# Patient Record
Sex: FEMALE | Race: WHITE | Employment: UNEMPLOYED | URBAN - METROPOLITAN AREA
[De-identification: names, ages, dates, MRNs, and addresses within clinical notes are randomized per-mention and may not be internally consistent; named-entity substitution may affect disease eponyms.]

---

## 2017-01-03 ENCOUNTER — OFFICE VISIT (OUTPATIENT)
Dept: FAMILY MEDICINE CLINIC | Age: 52
End: 2017-01-03

## 2017-01-03 VITALS
DIASTOLIC BLOOD PRESSURE: 51 MMHG | RESPIRATION RATE: 20 BRPM | SYSTOLIC BLOOD PRESSURE: 90 MMHG | OXYGEN SATURATION: 100 % | TEMPERATURE: 97.9 F | WEIGHT: 134 LBS | HEIGHT: 69 IN | BODY MASS INDEX: 19.85 KG/M2 | HEART RATE: 60 BPM

## 2017-01-03 DIAGNOSIS — Z80.8 FH: SKIN CANCER: ICD-10-CM

## 2017-01-03 DIAGNOSIS — Z12.31 ENCOUNTER FOR SCREENING MAMMOGRAM FOR BREAST CANCER: ICD-10-CM

## 2017-01-03 DIAGNOSIS — R63.5 WEIGHT GAIN: ICD-10-CM

## 2017-01-03 DIAGNOSIS — L98.9 FACE LESION: ICD-10-CM

## 2017-01-03 DIAGNOSIS — Z12.11 COLON CANCER SCREENING: ICD-10-CM

## 2017-01-03 DIAGNOSIS — Z00.00 HEALTHCARE MAINTENANCE: Primary | ICD-10-CM

## 2017-01-03 PROBLEM — Z90.49 S/P CHOLECYSTECTOMY: Status: ACTIVE | Noted: 2017-01-03

## 2017-01-03 NOTE — LETTER
1/4/2017 8:29 AM 
 
Ms. Chantell Jenkins [address] Dear Chantell Jenkins: Please find your most recent results below. Resulted Orders LIPID PANEL Result Value Ref Range Cholesterol, total 240 (H) 100 - 199 mg/dL Triglyceride 52 0 - 149 mg/dL HDL Cholesterol 107 >39 mg/dL VLDL, calculated 10 5 - 40 mg/dL LDL, calculated 123 (H) 0 - 99 mg/dL Narrative Performed at:  [address]  [id number] : Flor Doe MD, Phone:  [phone] CBC W/O DIFF Result Value Ref Range WBC 3.4 3.4 - 10.8 x10E3/uL  
 RBC 4.51 3.77 - 5.28 x10E6/uL HGB 13.8 11.1 - 15.9 g/dL HCT 40.6 34.0 - 46.6 % MCV 90 79 - 97 fL  
 MCH 30.6 26.6 - 33.0 pg  
 MCHC 34.0 31.5 - 35.7 g/dL  
 RDW 13.6 12.3 - 15.4 % PLATELET 348 039 - 119 x10E3/uL Narrative Performed at:  [address]  [id number] : Flor Doe MD, Phone:  [phone] METABOLIC PANEL, COMPREHENSIVE Result Value Ref Range Glucose 95 65 - 99 mg/dL BUN 14 6 - 24 mg/dL Creatinine 0.69 0.57 - 1.00 mg/dL GFR est non- >59 mL/min/1.73 GFR est  >59 mL/min/1.73  
 BUN/Creatinine ratio 20 9 - 23 Sodium 143 134 - 144 mmol/L Potassium 4.8 3.5 - 5.2 mmol/L Chloride 103 96 - 106 mmol/L  
 CO2 24 18 - 29 mmol/L Calcium 9.3 8.7 - 10.2 mg/dL Protein, total 6.6 6.0 - 8.5 g/dL Albumin 4.4 3.5 - 5.5 g/dL GLOBULIN, TOTAL 2.2 1.5 - 4.5 g/dL A-G Ratio 2.0 1.1 - 2.5 Bilirubin, total 1.3 (H) 0.0 - 1.2 mg/dL Alk. phosphatase 44 39 - 117 IU/L  
 AST 20 0 - 40 IU/L  
 ALT 23 0 - 32 IU/L Narrative Performed at:  [address]  [id number] : Flor Doe MD, Phone:  [phone] TSH, 3RD GENERATION Result Value Ref Range TSH 1.040 0.450 - 4.500 uIU/mL Narrative Performed at:  [address]  [id number] : Toby Garzon MD, Phone:  [phone] CVD REPORT Result Value Ref Range INTERPRETATION Note Comment:  
   Supplement report is available. Narrative Performed at:  [address] [place] [address]  [id number] : Gigi Morrow PhD, Phone:  [phone] Your lipid profile looks great. You have a 0.4 % chance of developing heart disease based on your current risks.  Because of this I suggest:  Keep up the great exercise regimen! Your thyroid is normal.    
 
Your bilirubin is a up a bit reflecting your recent gallbladder surgery. Sincerely, Pb Salmon MD

## 2017-01-03 NOTE — MR AVS SNAPSHOT
Visit Information Date & Time Provider Department Dept. Phone Encounter #  
 1/3/2017 10:00 AM Valentine Pascual MD [facility] [facility] [phone] [id number] Your Appointments 1/6/2017  9:00 AM  
COMPLETE PHYSICAL with Gopi Hdz MD  
[facility]) Appt Note: CPE with pap  
 [address] [employer] [facility]  
[phone]  
  
   
 [address] [place] [address] Upcoming Health Maintenance Date Due FOBT Q 1 YEAR AGE 50-75 8/7/2017 PAP AKA CERVICAL CYTOLOGY 9/22/2017 BREAST CANCER SCRN MAMMOGRAM 8/7/2018 DTaP/Tdap/Td series (2 - Td) 4/25/2021 Allergies as of 1/3/2017  Review Complete On: 1/3/2017 By: Elsie Reyna Severity Noted Reaction Type Reactions Amoxicillin  08/06/2010    Swelling Pcn [Penicillins]  05/10/2010    Swelling Current Immunizations  Reviewed on 11/10/2014 Name Date Influenza Vaccine PF 11/10/2014 TDAP Vaccine 4/25/2011 Not reviewed this visit You Were Diagnosed With   
  
 Codes Comments Healthcare maintenance    -  Primary ICD-10-CM: Z00.00 ICD-9-CM: V70.0 Colon cancer screening     ICD-10-CM: Z12.11 ICD-9-CM: V76.51 FH: skin cancer     ICD-10-CM: Z80.8 ICD-9-CM: V16.8 Face lesion     ICD-10-CM: L98.9 ICD-9-CM: 709.9 Encounter for screening mammogram for breast cancer     ICD-10-CM: Z12.31 
ICD-9-CM: V76.12 Vitals BP Pulse Temp Resp Height(growth percentile) Weight(growth percentile) 90/51 60 97.9 °F (36.6 °C) (Oral) 20 5' 9\" (1.753 m) 134 lb (60.8 kg) LMP SpO2 BMI OB Status Smoking Status 08/06/2009 100% 19.79 kg/m2 Postmenopausal Never Smoker BMI and BSA Data Body Mass Index Body Surface Area 19.79 kg/m 2 1.72 m 2 Preferred Pharmacy Pharmacy Name Phone  [address] [place], [place] - [address] [facility] AT [address] [place] [address] [address] [phone] Your Updated Medication List  
  
   
This list is accurate as of: 1/3/17 10:34 AM.  Always use your most recent med list.  
  
  
  
  
 cetirizine 10 mg tablet Commonly known as:  ZYRTEC  
TAKE 1 TABLET BY MOUTH DAILY. clotrimazole-betamethasone topical cream  
Commonly known as:  Karron Armand Apply twice daily for 2 weeks DENTA 5000 PLUS 1.1 % Crea Generic drug:  sodium fluoride diphenhydrAMINE 25 mg tablet Commonly known as:  BENADRYL Take 25 mg by mouth every six (6) hours as needed for Sleep. HERBAL DRUGS PO Take  by mouth. SYSTANE (PROPYLENE GLYCOL) 0.4-0.3 % Drop Generic drug:  peg 400-propylene glycol Administer  to left eye as needed. tretinoin 0.05 % topical cream  
Commonly known as:  RETIN-A  
APPLY TO AFFECTED AREA NIGHTLY AT BEDTIME To-Do List   
 01/03/2017 Imaging:  [facility] BI SCREENING INCL CAD Referral Information Referral ID Referred By Referred To  
  
 [id number] Benny Wadsworth Not Available Visits Status Start Date End Date 1 New Request 1/3/17 1/3/18 If your referral has a status of pending review or denied, additional information will be sent to support the outcome of this decision. Referral ID Referred By Referred To  
 [id number] Benny Wadsworth Not Available Visits Status Start Date End Date 1 New Request 1/3/17 1/3/18 If your referral has a status of pending review or denied, additional information will be sent to support the outcome of this decision. Referral ID Referred By Referred To  
 [id number] Benny Wadsworth Not Available Visits Status Start Date End Date 1 New Request 1/3/17 1/3/18 If your referral has a status of pending review or denied, additional information will be sent to support the outcome of this decision. Patient Instructions Please call Ever Coulter to help arrange and authorize any tests and/or referrals. Her # is [phone] Contact Dr. Héctor Claire office to see if you need colonoscopy See your dermatologist 
 
See facial plastic surgeon as we discussed In general, I advise patients to be as active as possible. I believe exercise is the key to long life and good health. The current recommendation is for individuals to exercise for 150 minutes each week (in other words 30 minutes 5 days a week). Exercise should be vigorous enough to work up a sweat. These activities include brisk walking, running, tennis, swimming, weight-lifting, etc.  
 
I usually tell folks that work is work and exercise is exercise. Each of these activities has a different goal.  Even though you may be active at work, it may not be aerobically adequate. So build dedicated exercise time into your weekly routine. Focus on regular exercise (150 minutes each week) and healthy eating. Eat more fruits and vegetables. Eat more protein (egg whites, beans, and nuts you know you tolerate) and less carbohydrates (white bread, white rice, white pasta, white potatoes, sodas, and sweets). Eat appropriately small portion sizes. Eat a healthy diet:  You may also wish to look into the Mediterranean or the DASH Diet:  
 
If any patient drinks alcohol, I typically suggest that a male drink no more than 2 beers (or glasses of wine, or shots of liquor) in any 24 hour period (and not daily). For females, the limits are one drink per 24 hours (and not daily). After these limits, the toxic effects of alcohol consumption start to manifest.  
 
Avoid tobacco products. I may provide separate instructions discussing smoking cessation strategies if needed. I suggest a wellness exam yearly during your birth month to update health maintenance issues such as fasting labs, EKGs and other studies, appropriate cancer screenings,  immunizations, etc.   
 
I suggest a yearly flu shot Labs today Gyn exam soon 
 
mammogram 
 
 
 
  
 Introducing [facility]! Dear Cristiane Ceballos: Thank you for requesting a [employer] account. Our records indicate that you already have an active [employer] account. You can access your account anytime at [email or web address] Did you know that you can access your hospital and ER discharge instructions at any time in [employer]? You can also review all of your test results from your hospital stay or ER visit. Additional Information If you have questions, please visit the Frequently Asked Questions section of the [employer] website at [email or web address] Remember, [employer] is NOT to be used for urgent needs. For medical emergencies, dial 911. Now available from your iPhone and Android! Please provide this summary of care documentation to your next provider. Your primary care clinician is listed as Farzaneh Martinez. If you have any questions after today's visit, please call [phone].

## 2017-01-03 NOTE — PROGRESS NOTES
Edel Taveras is a 46 y.o. female      Issues discussed today include:      CPx      We discussed health maintenance/diet/exercise/weight loss    PMH:  She had her GB removed and umbilical hernia repar by Dr. Loren Sethi    She sees Dr/ Azalia lam for GYN    She still feels the lump in her right cheek that Dr. Tatyana Sierra previously addressed. She wants second opinion with Dr. Bridget Finley    She needs f/u with her dermatologist Dr. Tiffani Villarreal for +FH skin cancer    I am still not clear if she had colonoscopy yet, will have her contact Dr. Molly Almazan office to clarify      Data reviewed or ordered today:  Labs, mammogram    Other problems include:  Patient Active Problem List   Diagnosis Code    GERD (gastroesophageal reflux disease) K21.9    S/P colonoscopy Z98.890    Allergic rhinitis J30.9    Unspecified vitamin D deficiency E55.9    Menopausal state N95.1    Dry eye syndrome H04.129    Hiatal hernia K44.9    S/P cholecystectomy Z90.49       Medications:  Current Outpatient Prescriptions   Medication Sig Dispense Refill    tretinoin (RETIN-A) 0.05 % topical cream APPLY TO AFFECTED AREA NIGHTLY AT BEDTIME 1 Tube 3    peg 400-propylene glycol (SYSTANE) 0.4-0.3 % Drop Administer  to left eye as needed.  clotrimazole-betamethasone (LOTRISONE) topical cream Apply twice daily for 2 weeks 45 g 0    cetirizine (ZYRTEC) 10 mg tablet TAKE 1 TABLET BY MOUTH DAILY. 90 Tab 3    diphenhydrAMINE (BENADRYL) 25 mg tablet Take 25 mg by mouth every six (6) hours as needed for Sleep.  DENTA 5000 PLUS 1.1 % crea   6    HERBAL DRUGS PO Take  by mouth. Allergies: Allergies   Allergen Reactions    Amoxicillin Swelling    Pcn [Penicillins] Swelling       LMP:  Patient's last menstrual period was 08/06/2009. Social History     Social History    Marital status:      Spouse name: N/A    Number of children: N/A    Years of education: N/A     Occupational History    Not on file.      Social History Main Topics    Smoking status: Never Smoker    Smokeless tobacco: Never Used    Alcohol use No    Drug use: Yes     Special: Benzodiazepines    Sexual activity: Yes     Partners: Male     Birth control/ protection: Condom     Other Topics Concern    Not on file     Social History Narrative    Moved her from [place]    Younger son has autism but was healed of speech problem         Family History   Problem Relation Age of Onset    Cancer Father     Ovarian Cancer Maternal Aunt 61     Other family history:  Son autism    Meaningful use:  done      ROS:  Headaches:  no  Chest Pain:  no  SOB:  no  Fevers:  no  Other significant ROS:  No GI/ issue, lump and swelling right cheek, she exercises regularly    Patient's last menstrual period was 08/06/2009. Physical Exam  Visit Vitals    BP 90/51    Pulse 60    Temp 97.9 °F (36.6 °C) (Oral)    Resp 20    Ht 5' 9\" (1.753 m)    Wt 134 lb (60.8 kg)    LMP 08/06/2009    SpO2 100%    BMI 19.79 kg/m2     BP Readings from Last 3 Encounters:   01/03/17 90/51   08/07/16 96/59   07/25/16 91/53     Constitutional:  Appears well,  No Acute Distress, Vitals noted  Psychiatric:   Affect normal, Alert and Oriented to person/place/time    Eyes:   Pupils equally round and reactive, EOMI, conjunctiva clear, eyelids normal  ENT:   External ears and nose normal/lips, teeth=OK/gums normal, TMs and Orophyarynx normal  Neck:   general inspection and Thyroid normal.  No abnormal cervical or supraclavicular nodes    Lungs:   clear to auscultation, good respiratory effort  Heart: Ausculation normal.  Regular rhythm. No cardiac murmurs.   No carotid bruits or palpable thrills  Chest wall normal    Extremities:   without edema, good peripheral pulses  Skin:   Warm to palpation, without rashes, bruising, or suspicious lesions , fullness right maxilla area    Abdominal exam:   normal.  Liver and spleen normal.  No bruits/masses/tenderness        Assessment:    Patient Active Problem List   Diagnosis Code    GERD (gastroesophageal reflux disease) K21.9    S/P colonoscopy Z98.890    Allergic rhinitis J30.9    Unspecified vitamin D deficiency E55.9    Menopausal state N95.1    Dry eye syndrome H04.129    Hiatal hernia K44.9    S/P cholecystectomy Z90.49       Today's diagnoses are:    ICD-10-CM ICD-9-CM    1. Healthcare maintenance Z00.00 V70.0 LIPID PANEL      CBC W/O DIFF      METABOLIC PANEL, COMPREHENSIVE   2. Colon cancer screening Z12.11 V76.51 REFERRAL TO GASTROENTEROLOGY   3. FH: skin cancer Z80.8 V16.8 REFERRAL TO DERMATOLOGY   4. Face lesion L98.9 709.9 REFERRAL TO PLASTIC SURGERY   5. Encounter for screening mammogram for breast cancer Z12.31 V76.12 [facility] BI SCREENING INCL CAD       Plan:  Orders Placed This Encounter    [facility] MAMMO BI SCREENING INCL CAD     Standing Status:   Future     Standing Expiration Date:   2/3/2018     Order Specific Question:   Reason for Exam     Answer:   screening    LIPID PANEL    CBC W/O DIFF    METABOLIC PANEL, COMPREHENSIVE    REFERRAL TO GASTROENTEROLOGY     Referral Priority:   Routine     Referral Type:   Consultation     Referral Reason:   Specialty Services Required    REFERRAL TO DERMATOLOGY     Referral Priority:   Routine     Referral Type:   Consultation     Referral Reason:   Specialty Services Required    REFERRAL TO PLASTIC SURGERY     Referral Priority:   Routine     Referral Type:   Consultation     Referral Reason:   Specialty Services Required       See patient instructions  Patient Instructions   Please call Pricilla Parr to help arrange and authorize any tests and/or referrals. Her # is [phone]     Contact Dr. العراقي Quest office to see if you need colonoscopy    See your dermatologist    See facial plastic surgeon as we discussed    In general, I advise patients to be as active as possible. I believe exercise is the key to long life and good health.   The current recommendation is for individuals to exercise for 150 minutes each week (in other words 30 minutes 5 days a week). Exercise should be vigorous enough to work up a sweat. These activities include brisk walking, running, tennis, swimming, weight-lifting, etc.     I usually tell folks that work is work and exercise is exercise. Each of these activities has a different goal.  Even though you may be active at work, it may not be aerobically adequate. So build dedicated exercise time into your weekly routine. Focus on regular exercise (150 minutes each week) and healthy eating. Eat more fruits and vegetables. Eat more protein (egg whites, beans, and nuts you know you tolerate) and less carbohydrates (white bread, white rice, white pasta, white potatoes, sodas, and sweets). Eat appropriately small portion sizes. Eat a healthy diet:  You may also wish to look into the Mediterranean or the DASH Diet:     If any patient drinks alcohol, I typically suggest that a male drink no more than 2 beers (or glasses of wine, or shots of liquor) in any 24 hour period (and not daily). For females, the limits are one drink per 24 hours (and not daily). After these limits, the toxic effects of alcohol consumption start to manifest.     Avoid tobacco products. I may provide separate instructions discussing smoking cessation strategies if needed.     I suggest a wellness exam yearly during your birth month to update health maintenance issues such as fasting labs, EKGs and other studies, appropriate cancer screenings,  immunizations, etc.      I suggest a yearly flu shot    Labs today    Gyn exam soon    mammogram            refresh note: done    AVS Printed:  done

## 2017-01-03 NOTE — PROGRESS NOTES
Chief Complaint   Patient presents with    Complete Physical     is fasting. Dr. Rosie Arguelles will do pap, et al at the end of the week. Reviewed record in preparation for visit and have obtained necessary documentation. 1. Have you been to the ER, urgent care clinic since your last visit? Hospitalized since your last visit? No    2. Have you seen or consulted any other health care providers outside of the [address] [place] [address] since your last visit? Include any pap smears or colon screening.  No

## 2017-01-04 PROBLEM — E78.9 LIPID DISORDER: Status: ACTIVE | Noted: 2017-01-04

## 2017-01-04 LAB
ALBUMIN SERPL-MCNC: 4.4 G/DL (ref 3.5–5.5)
ALBUMIN/GLOB SERPL: 2 {RATIO} (ref 1.1–2.5)
ALP SERPL-CCNC: 44 IU/L (ref 39–117)
ALT SERPL-CCNC: 23 IU/L (ref 0–32)
AST SERPL-CCNC: 20 IU/L (ref 0–40)
BILIRUB SERPL-MCNC: 1.3 MG/DL (ref 0–1.2)
BUN SERPL-MCNC: 14 MG/DL (ref 6–24)
BUN/CREAT SERPL: 20 (ref 9–23)
CALCIUM SERPL-MCNC: 9.3 MG/DL (ref 8.7–10.2)
CHLORIDE SERPL-SCNC: 103 MMOL/L (ref 96–106)
CHOLEST SERPL-MCNC: 240 MG/DL (ref 100–199)
CO2 SERPL-SCNC: 24 MMOL/L (ref 18–29)
CREAT SERPL-MCNC: 0.69 MG/DL (ref 0.57–1)
ERYTHROCYTE [DISTWIDTH] IN BLOOD BY AUTOMATED COUNT: 13.6 % (ref 12.3–15.4)
GLOBULIN SER CALC-MCNC: 2.2 G/DL (ref 1.5–4.5)
GLUCOSE SERPL-MCNC: 95 MG/DL (ref 65–99)
HCT VFR BLD AUTO: 40.6 % (ref 34–46.6)
HDLC SERPL-MCNC: 107 MG/DL
HGB BLD-MCNC: 13.8 G/DL (ref 11.1–15.9)
INTERPRETATION, 910389: NORMAL
LDLC SERPL CALC-MCNC: 123 MG/DL (ref 0–99)
MCH RBC QN AUTO: 30.6 PG (ref 26.6–33)
MCHC RBC AUTO-ENTMCNC: 34 G/DL (ref 31.5–35.7)
MCV RBC AUTO: 90 FL (ref 79–97)
PLATELET # BLD AUTO: 289 X10E3/UL (ref 150–379)
POTASSIUM SERPL-SCNC: 4.8 MMOL/L (ref 3.5–5.2)
PROT SERPL-MCNC: 6.6 G/DL (ref 6–8.5)
RBC # BLD AUTO: 4.51 X10E6/UL (ref 3.77–5.28)
SODIUM SERPL-SCNC: 143 MMOL/L (ref 134–144)
TRIGL SERPL-MCNC: 52 MG/DL (ref 0–149)
TSH SERPL DL<=0.005 MIU/L-ACNC: 1.04 UIU/ML (ref 0.45–4.5)
VLDLC SERPL CALC-MCNC: 10 MG/DL (ref 5–40)
WBC # BLD AUTO: 3.4 X10E3/UL (ref 3.4–10.8)

## 2017-01-04 NOTE — PROGRESS NOTES
Your lipid profile looks great. You have a 0.4 % chance of developing heart disease based on your current risks. Because of this I suggest:  Keep up the great exercise regimen! Your thyroid is normal.      Your bilirubin is a up a bit reflecting your recent gallbladder surgery.

## 2017-01-06 ENCOUNTER — OFFICE VISIT (OUTPATIENT)
Dept: FAMILY MEDICINE CLINIC | Age: 52
End: 2017-01-06

## 2017-01-06 VITALS
HEART RATE: 63 BPM | DIASTOLIC BLOOD PRESSURE: 65 MMHG | OXYGEN SATURATION: 99 % | TEMPERATURE: 98.4 F | RESPIRATION RATE: 17 BRPM | HEIGHT: 69 IN | SYSTOLIC BLOOD PRESSURE: 100 MMHG

## 2017-01-06 DIAGNOSIS — N95.1 MENOPAUSAL STATE: ICD-10-CM

## 2017-01-06 DIAGNOSIS — Z01.419 VISIT FOR PELVIC EXAM: Primary | ICD-10-CM

## 2017-01-06 DIAGNOSIS — N95.2 POSTMENOPAUSAL ATROPHIC VAGINITIS: ICD-10-CM

## 2017-01-06 RX ORDER — CETIRIZINE HCL 10 MG
TABLET ORAL
COMMUNITY
Start: 2016-12-28 | End: 2019-12-18

## 2017-01-06 RX ORDER — SODIUM FLUORIDE 1.1 G/100G
GEL, DENTIFRICE ORAL
Refills: 3 | COMMUNITY
Start: 2016-11-29 | End: 2019-05-28

## 2017-01-06 NOTE — PROGRESS NOTES
Subjective:   46 y.o. female  G [id number] s/p c/s x 2 for Well Woman Check. She is postmenopausal since 45s. Denies any hx of abn pap smears   Still currently . Denies any vaginal bleeding or spotting. Social History: single partner, contraception - post menopausal status. Pertinent past medical hstory: no history of HTN, DVT, CAD, DM, liver disease, migraines or smoking. FHx paternal 1nd Aunt with ovarian cancer. Dad with stomach cancer. Patient Active Problem List   Diagnosis Code    GERD (gastroesophageal reflux disease) K21.9    S/P colonoscopy Z98.890    Allergic rhinitis J30.9    Unspecified vitamin D deficiency E55.9    Menopausal state N95.1    Dry eye syndrome H04.129    Hiatal hernia K44.9    S/P cholecystectomy Z90.49    Lipid disorder E78.9     Patient Active Problem List    Diagnosis Date Noted    Lipid disorder 01/04/2017    S/P cholecystectomy 01/03/2017    Hiatal hernia 05/13/2013    Unspecified vitamin D deficiency 06/07/2012    Menopausal state 06/07/2012    Dry eye syndrome 06/07/2012    GERD (gastroesophageal reflux disease) 04/25/2011    S/P colonoscopy 04/25/2011    Allergic rhinitis 04/25/2011     Current Outpatient Prescriptions   Medication Sig Dispense Refill    cetirizine (ZYRTEC) 10 mg tablet       DENTA 5000 PLUS 1.1 % crea USE TWICE A DAY AS DIRECTED  3    tretinoin (RETIN-A) 0.05 % topical cream APPLY TO AFFECTED AREA NIGHTLY AT BEDTIME 1 Tube 3    peg 400-propylene glycol (SYSTANE) 0.4-0.3 % Drop Administer  to left eye as needed.          Allergies   Allergen Reactions    Amoxicillin Swelling    Pcn [Penicillins] Swelling     Past Medical History   Diagnosis Date    Acid indigestion     Esophagitis     Fast heart beat     Gallbladder attack     Gilbert syndrome     Hypercholesterolemia     Stomach pain      Past Surgical History   Procedure Laterality Date    Hx gyn  2006     c-sec    Hx heent  1970     tonsilectomy    Hx other surgical  3/8/16     single site laparoscopic cholecystectomy    Pr abdomen surgery proc unlisted      Hx cholecystectomy  3/2016    Hx cholecystectomy Right      Social History   Substance Use Topics    Smoking status: Never Smoker    Smokeless tobacco: Never Used    Alcohol use No        ROS:  Feeling well. No dyspnea or chest pain on exertion. No abdominal pain, change in bowel habits, black or bloody stools. No urinary tract symptoms. GYN ROS: no breast pain or new or enlarging lumps on self exam, no vaginal bleeding, no discharge or pelvic pain. Menopausal symptoms: none. No neurological complaints. Last DEXA scan and T-score: none  Last Colonoscopy: given FOBT card awaiting results from GI. Last Mammogram: to be scheduled. Does note some enlargement. Objective:     Visit Vitals    /65 (BP 1 Location: Left arm, BP Patient Position: Sitting)    Pulse 63    Temp 98.4 °F (36.9 °C) (Oral)    Resp 17    Ht 5' 9\" (1.753 m)    LMP 08/06/2009    SpO2 99%     The patient appears well, alert, oriented x 3, in no distress. ENT normal.  Neck supple. No adenopathy or thyromegaly. SAYDA. Lungs are clear, good air entry, no wheezes, rhonchi or rales. S1 and S2 normal, no murmurs, regular rate and rhythm. Abdomen soft without tenderness, guarding, mass or organomegaly. Extremities show no edema, normal peripheral pulses. Neurological is normal, no focal findings.     BREAST EXAM: breasts appear normal, no suspicious masses, no skin or nipple changes or axillary nodes    PELVIC EXAM: VULVA: normal appearing vulva with no masses, tenderness or lesions, VAGINA: atrophic, CERVIX: normal appearing cervix without discharge or lesions, atrophic with mild cervical stenosis, UTERUS: uterus is normal size, shape, consistency and nontender, retroverted, ADNEXA: normal adnexa in size, nontender and no masses, exam chaperoned by LPN    Assessment/Plan:   well woman  postmenopausal  return annually or prn  labs updated. atrophic vaginitis-cont supportive care. RTC 1 yr or prn. Rashida Garcia

## 2017-01-06 NOTE — PATIENT INSTRUCTIONS
Well Visit, Women 48 to 72: Care Instructions  Your Care Instructions  Physical exams can help you stay healthy. Your doctor has checked your overall health and may have suggested ways to take good care of yourself. He or she also may have recommended tests. At home, you can help prevent illness with healthy eating, regular exercise, and other steps. Follow-up care is a key part of your treatment and safety. Be sure to make and go to all appointments, and call your doctor if you are having problems. It's also a good idea to know your test results and keep a list of the medicines you take. How can you care for yourself at home? · Reach and stay at a healthy weight. This will lower your risk for many problems, such as obesity, diabetes, heart disease, and high blood pressure. · Get at least 30 minutes of exercise on most days of the week. Walking is a good choice. You also may want to do other activities, such as running, swimming, cycling, or playing tennis or team sports. · Do not smoke. Smoking can make health problems worse. If you need help quitting, talk to your doctor about stop-smoking programs and medicines. These can increase your chances of quitting for good. · Protect your skin from too much sun. When you're outdoors from 10 a.m. to 4 p.m., stay in the shade or cover up with clothing and a hat with a wide brim. Wear sunglasses that block UV rays. Even when it's cloudy, put broad-spectrum sunscreen (SPF 30 or higher) on any exposed skin. · See a dentist one or two times a year for checkups and to have your teeth cleaned. · Wear a seat belt in the car. · Limit alcohol to 1 drink a day. Too much alcohol can cause health problems. Follow your doctor's advice about when to have certain tests. These tests can spot problems early. · Cholesterol.  Your doctor will tell you how often to have this done based on your age, family history, or other things that can increase your risk for heart attack and stroke. · Blood pressure. Have your blood pressure checked during a routine doctor visit. Your doctor will tell you how often to check your blood pressure based on your age, your blood pressure results, and other factors. · Mammogram. Ask your doctor how often you should have a mammogram, which is an X-ray of your breasts. A mammogram can spot breast cancer before it can be felt and when it is easiest to treat. · Pap test and pelvic exam. Ask your doctor how often you should have a Pap test. You may not need to have a Pap test as often as you used to. · Vision. Have your eyes checked every year or two or as often as your doctor suggests. Some experts recommend that you have yearly exams for glaucoma and other age-related eye problems starting at age 48. · Hearing. Tell your doctor if you notice any change in your hearing. You can have tests to find out how well you hear. · Diabetes. Ask your doctor whether you should have tests for diabetes. · Colon cancer. You should begin tests for colon cancer at age 48. You may have one of several tests. Your doctor will tell you how often to have tests based on your age and risk. Risks include whether you already had a precancerous polyp removed from your colon or whether your parents, sisters and brothers, or children have had colon cancer. · Thyroid disease. Talk to your doctor about whether to have your thyroid checked as part of a regular physical exam. Women have an increased chance of a thyroid problem. · Osteoporosis. You should begin tests for bone density at age 72. If you are younger than 72, ask your doctor whether you have factors that may increase your risk for this disease. You may want to have this test before age 72. · Heart attack and stroke risk. At least every 4 to 6 years, you should have your risk for heart attack and stroke assessed.  Your doctor uses factors such as your age, blood pressure, cholesterol, and whether you smoke or have diabetes to show what your risk for a heart attack or stroke is over the next 10 years. When should you call for help? Watch closely for changes in your health, and be sure to contact your doctor if you have any problems or symptoms that concern you. Where can you learn more? Go to http://ruben-brayden.info/. Enter L300 in the search box to learn more about \"Well Visit, Women 50 to 72: Care Instructions. \"  Current as of: July 19, 2016  Content Version: 11.1  © 0749-3435 Tapatalk. Care instructions adapted under license by News[employer]rape (which disclaims liability or warranty for this information). If you have questions about a medical condition or this instruction, always ask your healthcare professional. [address] any warranty or liability for your use of this information. Hot Flashes During Menopause: Care Instructions  Your Care Instructions  A hot flash is a sudden feeling of intense body heat. Your head, neck, and chest may get red. Your heartbeat may speed up, and you may feel anxious or irritable. You may find that hot flashes occur more often in warm rooms or during stressful times. Hot flashes and other symptoms are a normal response to the hormone changes that occur before your menstrual cycle goes away completely (menopause). Hot flashes often get better and go away with time. Making a few changes, such as exercising more, practicing meditation, quitting smoking, and drinking less alcohol, can help. Some women take hormone pills or other medicine to treat bothersome symptoms. Follow-up care is a key part of your treatment and safety. Be sure to make and go to all appointments, and call your doctor if you are having problems. Its also a good idea to know your test results and keep a list of the medicines you take. How can you care for yourself at home? · If you decide to take medicine to treat hot flashes, take it exactly as prescribed. Call your doctor if you think you are having a problem with your medicine. You will get more details on the specific medicine your doctor prescribes. · Learn to meditate. Sit quietly and focus on your breathing. Try to practice each day. Books, classes, and tapes can help you start a program.  · Wear natural fabrics, such as cotton and silk. Dress in layers so you can take off clothes as needed. · Keep the room temperature cool, or use a fan. You are more likely to have a hot flash when you are too warm than when you are cool. · Use fewer blankets when you sleep at night. · Drink cold fluids rather than hot ones. Limit your intake of caffeine and alcohol. · Eat smaller meals more often during the day so your body makes less heat than when digesting large amounts of food. Eat low-fat and high-fiber foods. · Do not smoke. Smoking can make hot flashes worse. If you need help quitting, talk to your doctor about stop-smoking programs and medicines. These can increase your chances of quitting for good. · Get at least 30 minutes of exercise on most days of the week. Walking is a good choice. You also may want to do other activities, such as running, swimming, cycling, or playing tennis or team sports. Where can you learn more? Go to http://ruben-brayden.info/. Enter F700 in the search box to learn more about \"Hot Flashes During Menopause: Care Instructions. \"  Current as of: February 25, 2016  Content Version: 11.1  © 1476-9816 OpenX. Care instructions adapted under license by Secerno (which disclaims liability or warranty for this information). If you have questions about a medical condition or this instruction, always ask your healthcare professional. [place] [address] any warranty or liability for your use of this information.        Atrophic Vaginitis: Care Instructions  Your Care Instructions    Atrophic vaginitis is an irritation of the vagina. It's caused by thinning tissues and less moisture in the vaginal walls. It often happens during menopause when hormone levels change. Surgery to remove the ovaries also can cause it. Your doctor may do tests to rule out other causes. And you may get tests to measure your hormone levels. The problem is most often treated with the hormone estrogen. It comes in a cream, tablets, or a soft plastic ring that is placed in the vagina. Follow-up care is a key part of your treatment and safety. Be sure to make and go to all appointments, and call your doctor if you are having problems. It's also a good idea to know your test results and keep a list of the medicines you take. How can you care for yourself at home? · Use a water-based lubricant for your vagina if sex is dry or painful. Examples are Astroglide, Wet Lubricant Gel, and K-Y Jelly. · Talk with your doctor about using low-dose vaginal estrogen. It treats dryness and thinning tissue. · Do not douche. · Having sex improves blood flow to the vagina. This helps keep your tissue healthy. · Wipe from front to back after you use the toilet. This stops the spread of bacteria to your vagina. When should you call for help? Watch closely for changes in your health, and be sure to contact your doctor if:  · You have unexpected vaginal bleeding. · You do not get better as expected. Where can you learn more? Go to http://ruben-brayden.info/. Enter R330 in the search box to learn more about \"Atrophic Vaginitis: Care Instructions. \"  Current as of: February 25, 2016  Content Version: 11.1  © 8718-5729 Resonate. Care instructions adapted under license by Ask.com (which disclaims liability or warranty for this information).  If you have questions about a medical condition or this instruction, always ask your healthcare professional. [address] any warranty or liability for your use of this information.

## 2017-01-06 NOTE — MR AVS SNAPSHOT
Visit Information Date & Time Provider Department Dept. Phone Encounter #  
 1/6/2017  9:00 AM Favian Overall, [address] [facility] [phone] [id number] Upcoming Health Maintenance Date Due FOBT Q 1 YEAR AGE 50-75 8/7/2017 PAP AKA CERVICAL CYTOLOGY 9/22/2017 BREAST CANCER SCRN MAMMOGRAM 8/7/2018 DTaP/Tdap/Td series (2 - Td) 4/25/2021 Allergies as of 1/6/2017  Review Complete On: 1/6/2017 By: Favian Bae MD  
  
 Severity Noted Reaction Type Reactions Amoxicillin  08/06/2010    Swelling Pcn [Penicillins]  05/10/2010    Swelling Current Immunizations  Reviewed on 11/10/2014 Name Date Influenza Vaccine PF 11/10/2014 TDAP Vaccine 4/25/2011 Not reviewed this visit You Were Diagnosed With   
  
 Codes Comments Visit for pelvic exam    -  Primary ICD-10-CM: T02.007 ICD-9-CM: V72.31 Menopausal state     ICD-10-CM: N95.1 ICD-9-CM: 627.2 Postmenopausal atrophic vaginitis     ICD-10-CM: N95.2 ICD-9-CM: 627.3 Vitals BP Pulse Temp Resp Height(growth percentile) LMP  
 100/65 (BP 1 Location: Left arm, BP Patient Position: Sitting) 63 98.4 °F (36.9 °C) (Oral) 17 5' 9\" (1.753 m) 08/06/2009 SpO2 OB Status Smoking Status 99% Postmenopausal Never Smoker Preferred Pharmacy Pharmacy Name Phone [facility] [address] [place] [address] [address] [facility] [phone] Your Updated Medication List  
  
   
This list is accurate as of: 1/6/17  9:28 AM.  Always use your most recent med list.  
  
  
  
  
 cetirizine 10 mg tablet Commonly known as:  ZYRTEC  
  
 DENTA 5000 PLUS 1.1 % Crea Generic drug:  sodium fluoride USE TWICE A DAY AS DIRECTED SYSTANE (PROPYLENE GLYCOL) 0.4-0.3 % Drop Generic drug:  peg 400-propylene glycol Administer  to left eye as needed.   
  
 tretinoin 0.05 % topical cream  
 Commonly known as:  RETIN-A  
APPLY TO AFFECTED AREA NIGHTLY AT BEDTIME To-Do List   
 02/03/2017 9:15 AM  
(Arrive by 9:00 AM) Appointment with [facility] 1 at [facility] ([phone]) Shower or bathe using soap and water. Do not use deodorant, powder, perfumes, or lotion the day of your exam.  If your prior mammograms were not performed at [facility] 6 please bring films with you or forward prior images 2 days before your procedure. Check in at registration 15min before your appointment time unless you were instructed to do otherwise. A script is not necessary, but if you have one, please bring it on the day of the mammogram or have it faxed to the department. [facility] [phone] [facility]  [phone] [address]  [phone] [facility] [phone] [place] [id number] [facility] [phone] Please arrive 15 minutes prior to appointment to register Patient Instructions Well Visit, Women 48 to 72: Care Instructions Your Care Instructions Physical exams can help you stay healthy. Your doctor has checked your overall health and may have suggested ways to take good care of yourself. He or she also may have recommended tests. At home, you can help prevent illness with healthy eating, regular exercise, and other steps. Follow-up care is a key part of your treatment and safety. Be sure to make and go to all appointments, and call your doctor if you are having problems. It's also a good idea to know your test results and keep a list of the medicines you take. How can you care for yourself at home? · Reach and stay at a healthy weight. This will lower your risk for many problems, such as obesity, diabetes, heart disease, and high blood pressure. · Get at least 30 minutes of exercise on most days of the week. Walking is a good choice. You also may want to do other activities, such as running, swimming, cycling, or playing tennis or team sports. · Do not smoke. Smoking can make health problems worse. If you need help quitting, talk to your doctor about stop-smoking programs and medicines. These can increase your chances of quitting for good. · Protect your skin from too much sun. When you're outdoors from 10 a.m. to 4 p.m., stay in the shade or cover up with clothing and a hat with a wide brim. Wear sunglasses that block UV rays. Even when it's cloudy, put broad-spectrum sunscreen (SPF 30 or higher) on any exposed skin. · See a dentist one or two times a year for checkups and to have your teeth cleaned. · Wear a seat belt in the car. · Limit alcohol to 1 drink a day. Too much alcohol can cause health problems. Follow your doctor's advice about when to have certain tests. These tests can spot problems early. · Cholesterol. Your doctor will tell you how often to have this done based on your age, family history, or other things that can increase your risk for heart attack and stroke. · Blood pressure. Have your blood pressure checked during a routine doctor visit. Your doctor will tell you how often to check your blood pressure based on your age, your blood pressure results, and other factors. · Mammogram. Ask your doctor how often you should have a mammogram, which is an X-ray of your breasts. A mammogram can spot breast cancer before it can be felt and when it is easiest to treat. · Pap test and pelvic exam. Ask your doctor how often you should have a Pap test. You may not need to have a Pap test as often as you used to. · Vision. Have your eyes checked every year or two or as often as your doctor suggests. Some experts recommend that you have yearly exams for glaucoma and other age-related eye problems starting at age 48. · Hearing. Tell your doctor if you notice any change in your hearing. You can have tests to find out how well you hear. · Diabetes. Ask your doctor whether you should have tests for diabetes. · Colon cancer. You should begin tests for colon cancer at age 48. You may have one of several tests. Your doctor will tell you how often to have tests based on your age and risk. Risks include whether you already had a precancerous polyp removed from your colon or whether your parents, sisters and brothers, or children have had colon cancer. · Thyroid disease. Talk to your doctor about whether to have your thyroid checked as part of a regular physical exam. Women have an increased chance of a thyroid problem. · Osteoporosis. You should begin tests for bone density at age 72. If you are younger than 72, ask your doctor whether you have factors that may increase your risk for this disease. You may want to have this test before age 72. · Heart attack and stroke risk. At least every 4 to 6 years, you should have your risk for heart attack and stroke assessed. Your doctor uses factors such as your age, blood pressure, cholesterol, and whether you smoke or have diabetes to show what your risk for a heart attack or stroke is over the next 10 years. When should you call for help? Watch closely for changes in your health, and be sure to contact your doctor if you have any problems or symptoms that concern you. Where can you learn more? Go to http://ruben-brayden.info/. Enter T416 in the search box to learn more about \"Well Visit, Women 50 to 72: Care Instructions. \" Current as of: July 19, 2016 Content Version: 11.1 © 0827-4992 ubigrate. Care instructions adapted under license by [facility] (which disclaims liability or warranty for this information). If you have questions about a medical condition or this instruction, always ask your healthcare professional. [place] [address] any warranty or liability for your use of this information. Hot Flashes During Menopause: Care Instructions Your Care Instructions A hot flash is a sudden feeling of intense body heat. Your head, neck, and chest may get red. Your heartbeat may speed up, and you may feel anxious or irritable. You may find that hot flashes occur more often in warm rooms or during stressful times. Hot flashes and other symptoms are a normal response to the hormone changes that occur before your menstrual cycle goes away completely (menopause). Hot flashes often get better and go away with time. Making a few changes, such as exercising more, practicing meditation, quitting smoking, and drinking less alcohol, can help. Some women take hormone pills or other medicine to treat bothersome symptoms. Follow-up care is a key part of your treatment and safety. Be sure to make and go to all appointments, and call your doctor if you are having problems. Its also a good idea to know your test results and keep a list of the medicines you take. How can you care for yourself at home? · If you decide to take medicine to treat hot flashes, take it exactly as prescribed. Call your doctor if you think you are having a problem with your medicine. You will get more details on the specific medicine your doctor prescribes. · Learn to meditate. Sit quietly and focus on your breathing. Try to practice each day. Books, classes, and tapes can help you start a program. 
· Wear natural fabrics, such as cotton and silk. Dress in layers so you can take off clothes as needed. · Keep the room temperature cool, or use a fan. You are more likely to have a hot flash when you are too warm than when you are cool. · Use fewer blankets when you sleep at night. · Drink cold fluids rather than hot ones. Limit your intake of caffeine and alcohol. · Eat smaller meals more often during the day so your body makes less heat than when digesting large amounts of food. Eat low-fat and high-fiber foods. · Do not smoke. Smoking can make hot flashes worse.  If you need help quitting, talk to your doctor about stop-smoking programs and medicines. These can increase your chances of quitting for good. · Get at least 30 minutes of exercise on most days of the week. Walking is a good choice. You also may want to do other activities, such as running, swimming, cycling, or playing tennis or team sports. Where can you learn more? Go to http://ruben-brayden.info/. Enter F700 in the search box to learn more about \"Hot Flashes During Menopause: Care Instructions. \" Current as of: February 25, 2016 Content Version: 11.1 © 3651-7087 FundRazr. Care instructions adapted under license by [employer] (which disclaims liability or warranty for this information). If you have questions about a medical condition or this instruction, always ask your healthcare professional. [address] any warranty or liability for your use of this information. Atrophic Vaginitis: Care Instructions Your Care Instructions Atrophic vaginitis is an irritation of the vagina. It's caused by thinning tissues and less moisture in the vaginal walls. It often happens during menopause when hormone levels change. Surgery to remove the ovaries also can cause it. Your doctor may do tests to rule out other causes. And you may get tests to measure your hormone levels. The problem is most often treated with the hormone estrogen. It comes in a cream, tablets, or a soft plastic ring that is placed in the vagina. Follow-up care is a key part of your treatment and safety. Be sure to make and go to all appointments, and call your doctor if you are having problems. It's also a good idea to know your test results and keep a list of the medicines you take. How can you care for yourself at home? · Use a water-based lubricant for your vagina if sex is dry or painful. Examples are Astroglide, Wet Lubricant Gel, and K-Y Jelly. · Talk with your doctor about using low-dose vaginal estrogen. It treats dryness and thinning tissue. · Do not douche. · Having sex improves blood flow to the vagina. This helps keep your tissue healthy. · Wipe from front to back after you use the toilet. This stops the spread of bacteria to your vagina. When should you call for help? Watch closely for changes in your health, and be sure to contact your doctor if: 
· You have unexpected vaginal bleeding. · You do not get better as expected. Where can you learn more? Go to http://ruben-brayden.info/. Enter R330 in the search box to learn more about \"Atrophic Vaginitis: Care Instructions. \" Current as of: February 25, 2016 Content Version: 11.1 © 6068-7764 PharmaSecure. Care instructions adapted under license by Keyhole.co (which disclaims liability or warranty for this information). If you have questions about a medical condition or this instruction, always ask your healthcare professional. [address] any warranty or liability for your use of this information. Introducing [facility]! Dear Monserrat Tamayo: Thank you for requesting a ShieldEffect account. Our records indicate that you already have an active ShieldEffect account. You can access your account anytime at [email or web address] Did you know that you can access your hospital and ER discharge instructions at any time in ShieldEffect? You can also review all of your test results from your hospital stay or ER visit. Additional Information If you have questions, please visit the Frequently Asked Questions section of the ShieldEffect website at [email or web address] Remember, ShieldEffect is NOT to be used for urgent needs. For medical emergencies, dial 911. Now available from your iPhone and Android! Please provide this summary of care documentation to your next provider. Your primary care clinician is listed as Emily Morrissey. If you have any questions after today's visit, please call [phone].

## 2017-01-06 NOTE — PROGRESS NOTES
Chief Complaint   Patient presents with   Nonda Fake Exam     1. Have you been to the ER, urgent care clinic since your last visit? Hospitalized since your last visit? No    2. Have you seen or consulted any other health care providers outside of the [facility] since your last visit? Include any pap smears or colon screening. No       Unable to obtain patients weight.

## 2017-01-16 RX ORDER — TRETINOIN 0.5 MG/G
CREAM TOPICAL
Qty: 45 G | Refills: 3 | Status: SHIPPED | OUTPATIENT
Start: 2017-01-16 | End: 2017-03-19 | Stop reason: SDUPTHER

## 2017-02-03 ENCOUNTER — HOSPITAL ENCOUNTER (OUTPATIENT)
Dept: MAMMOGRAPHY | Age: 52
Discharge: HOME | End: 2017-02-03
Payer: OTHER GOVERNMENT

## 2017-02-03 DIAGNOSIS — Z12.31 ENCOUNTER FOR SCREENING MAMMOGRAM FOR BREAST CANCER: ICD-10-CM

## 2017-02-03 PROCEDURE — 77067 SCR MAMMO BI INCL CAD: CPT

## 2017-02-03 NOTE — LETTER
2/6/2017 8:39 AM 
 
Ms. Chantell Jenkins [address] Dear Chantell Jenkins: Please find your most recent results below. Resulted Orders [facility]IO MAMMO BI SCREENING INCL CAD Narrative STUDY:  Bilateral digital screening mammogram   
 
INDICATION:  Screening COMPARISON:  Most recent 2014 FINDINGS: Bilateral digital screening mammography was performed, and is 
interpreted in conjunction with a computer assisted detection (CAD) system. The 
breast parenchymal pattern is heterogeneously dense, which may diminish the 
sensitivity of mammography. No new masses or suspicious calcifications are 
identified. There is no skin thickening or nipple retraction. Impression IMPRESSION:  
No mammographic evidence of malignancy. Next screening mammogram is recommended 
in one year. The patient will be notified of these results. BIRADS 1:  Negative. In accordance with [place] legislation enacted July 2012 (32.1-229 of the Code 
of [place]), we have informed this patient by letter that she may have dense 
breast tissue. Dense breast tissue can hide cancer or other abnormalities. We 
have instructed her to contact her referring physician if she has any questions 
or concerns about this report. There are many factors that can increase a 
woman's risk of developing breast cancer, including a family history of breast 
cancer. A woman's lifetime risk of developing breast cancer can be estimated 
using the Breast Cancer Risk Assessment Tool, found on the National Cancer Institutes website (www.cancer.gov/bcrisktool/). The addition of breast MRI as a 
screening tool may be useful for women with lifetime risk assessments greater 
than 20%. Your recent mammogram was normal.  Congratulations.  Please follow up in one year. Sincerely, Marilyn Arenas MD

## 2017-02-03 NOTE — LETTER
2/6/2017 8:38 AM 
 
Ms. Bertha Ramírez [address] Dear Bertha Ramírez: Please find your most recent results below. Resulted Orders [facility]IO MAMMO BI SCREENING INCL CAD Narrative STUDY:  Bilateral digital screening mammogram   
 
INDICATION:  Screening COMPARISON:  Most recent 2014 FINDINGS: Bilateral digital screening mammography was performed, and is 
interpreted in conjunction with a computer assisted detection (CAD) system. The 
breast parenchymal pattern is heterogeneously dense, which may diminish the 
sensitivity of mammography. No new masses or suspicious calcifications are 
identified. There is no skin thickening or nipple retraction. Impression IMPRESSION:  
No mammographic evidence of malignancy. Next screening mammogram is recommended 
in one year. The patient will be notified of these results. BIRADS 1:  Negative. In accordance with [place] legislation enacted July 2012 (32.1-229 of the Code 
of [place]), we have informed this patient by letter that she may have dense 
breast tissue. Dense breast tissue can hide cancer or other abnormalities. We 
have instructed her to contact her referring physician if she has any questions 
or concerns about this report. There are many factors that can increase a 
woman's risk of developing breast cancer, including a family history of breast 
cancer. A woman's lifetime risk of developing breast cancer can be estimated 
using the Breast Cancer Risk Assessment Tool, found on the National Cancer Institutes website (www.cancer.gov/bcrisktool/). The addition of breast MRI as a 
screening tool may be useful for women with lifetime risk assessments greater 
than 20%. Your recent mammogram was normal.  Congratulations.  Please follow up in one year. Sincerely, Emma Hanson MD

## 2017-03-01 ENCOUNTER — TELEPHONE (OUTPATIENT)
Dept: FAMILY MEDICINE CLINIC | Age: 52
End: 2017-03-01

## 2017-03-01 DIAGNOSIS — K46.9 HERNIA: Primary | ICD-10-CM

## 2017-03-01 NOTE — TELEPHONE ENCOUNTER
Patient called to say she now has an ambilical hernia of last week like she had almost a year ago at the belly button. Every now and then she felt a jesse horse in the stomach and has a little bump/bulge there now.      She does do yoga.      Asking for advice as to what to do this time. [phone],    I sugggest she follow up with her surgeon    Please call Rosita Gaucher to help arrange and authorize any tests and/or referrals.   Her # is [phone]

## 2017-03-01 NOTE — TELEPHONE ENCOUNTER
Patient called to say she now has an ambilical hernia of last week like she had almost a year ago at the belly button. Every now and then she felt a jesse horse in the stomach and has a little bump/bulge there now. She does do yoga. Asking for advice as to what to do this time.   [phone],

## 2017-03-06 ENCOUNTER — TELEPHONE (OUTPATIENT)
Dept: FAMILY MEDICINE CLINIC | Age: 52
End: 2017-03-06

## 2017-03-06 NOTE — TELEPHONE ENCOUNTER
Patient called to check the status of her referral for her appointment tomorrow. Informed patient referral was complete. Patient verbalized understanding.

## 2017-03-07 ENCOUNTER — OFFICE VISIT (OUTPATIENT)
Dept: SURGERY | Age: 52
End: 2017-03-07

## 2017-03-07 VITALS
TEMPERATURE: 98.1 F | DIASTOLIC BLOOD PRESSURE: 61 MMHG | SYSTOLIC BLOOD PRESSURE: 98 MMHG | OXYGEN SATURATION: 98 % | HEART RATE: 60 BPM | WEIGHT: 136 LBS | HEIGHT: 69 IN | BODY MASS INDEX: 20.14 KG/M2 | RESPIRATION RATE: 14 BRPM

## 2017-03-07 DIAGNOSIS — K43.2 INCISIONAL HERNIA, WITHOUT OBSTRUCTION OR GANGRENE: Primary | ICD-10-CM

## 2017-03-07 NOTE — PROGRESS NOTES
1. Have you been to the ER, urgent care clinic since your last visit? Hospitalized since your last visit?no    2. Have you seen or consulted any other health care providers outside of the [address] [place] [address] since your last visit? Include any pap smears or colon screening.  no

## 2017-03-08 NOTE — PROGRESS NOTES
Surgery Consult    Subjective:      Barbie Mistry is a 46 y.o. female who is being seen for incisional hernia at her umbilicus. Patient has symptoms of bulging, which are made worse with bending, coughing, lifting. Symptoms were first noted several weeks ago. She denies pain. She first noticed the bulge while doing Yoga. Lump is reducible. Patient does not have symptoms of chronic constipation, chronic cough, difficulty urinating. Patient has a history of single incision robotic gallbladder surgery one year ago. Patient Active Problem List    Diagnosis Date Noted    Lipid disorder 01/04/2017    S/P cholecystectomy 01/03/2017    Hiatal hernia 05/13/2013    Unspecified vitamin D deficiency 06/07/2012    Menopausal state 06/07/2012    Dry eye syndrome 06/07/2012    GERD (gastroesophageal reflux disease) 04/25/2011    S/P colonoscopy 04/25/2011    Allergic rhinitis 04/25/2011      Past Medical History:   Diagnosis Date    Acid indigestion     Esophagitis     Fast heart beat     Gallbladder attack     Gilbert syndrome     Hypercholesterolemia     Stomach pain      Past Surgical History:   Procedure Laterality Date    ABDOMEN SURGERY PROC UNLISTED      HX CHOLECYSTECTOMY  3/2016    HX CHOLECYSTECTOMY Right     HX GYN  2006    c-sec    HX HEENT  1970    tonsilectomy    HX OTHER SURGICAL  3/8/16    single site laparoscopic cholecystectomy     Family History   Problem Relation Age of Onset    Cancer Father     Ovarian Cancer Maternal Aunt 61      Social History   Substance Use Topics    Smoking status: Never Smoker    Smokeless tobacco: Never Used    Alcohol use No      Current Outpatient Prescriptions   Medication Sig    tretinoin (RETIN-A) 0.05 % topical cream APPLY TO AFFECTED AREA, NIGHTLY AT BEDTIME    cetirizine (ZYRTEC) 10 mg tablet     DENTA 5000 PLUS 1.1 % crea USE TWICE A DAY AS DIRECTED    peg 400-propylene glycol (SYSTANE) 0.4-0.3 % Drop Administer  to left eye as needed. No current facility-administered medications for this visit. Allergies   Allergen Reactions    Amoxicillin Swelling    Pcn [Penicillins] Swelling        Review of Systems:  A comprehensive review of systems was negative except for that written in the History of Present Illness. Objective:     Blood pressure 98/61, pulse 60, temperature 98.1 °F (36.7 °C), temperature source Oral, resp. rate 14, height 5' 9\" (1.753 m), weight 136 lb (61.7 kg), last menstrual period 08/06/2009, SpO2 98 %. Physical Exam:  GENERAL: alert, cooperative, no distress, appears stated age, EYE: conjunctivae/corneas clear. , EOM's intact. , LUNG: clear to auscultation bilaterally, HEART: regular rate and rhythm, S1, S2 normal, no murmur, click, rub or gallop, ABDOMEN: soft, non-tender. Bowel sounds normal. No masses,  no organomegaly, 2 cm reducible incisional hernia NEUROLOGIC: AOx3. Gait normal. Reflexes and motor strength normal and symmetric. Cranial nerves 2-12 and sensation grossly intact. Assessment:     Incisional which is easily reducible. Plan:     1. Discussed possibility of incarceration, strangulation, enlargement in size over time, and the risk of emergency surgery in the face of strangulation. Also discussed the risks of surgery including recurrence which can be up to 50% in the case of incisional or complex hernias, use of prosthetic materials (mesh) and the increased risk of infection, postoperative infection and the possible need for reoperation and removal of mesh if used, possibility of postoperative small bowel obstruction or ileus, and the risks of general anesthetic. The patient understands the risks; any and all questions were answered to the patient's satisfaction.     2.  Patient is going to think about it and get back to me  Signed By: Jere Chu MD     March 8, 2017

## 2017-03-19 NOTE — TELEPHONE ENCOUNTER
Patient is requesting a refill on  Requested Prescriptions     Pending Prescriptions Disp Refills    tretinoin (RETIN-A) 0.05 % topical cream 45 g 3     Thank you

## 2017-03-20 RX ORDER — TRETINOIN 0.5 MG/G
CREAM TOPICAL
Qty: 135 G | Refills: 1 | Status: SHIPPED | COMMUNITY
Start: 2017-03-20 | End: 2019-03-14 | Stop reason: SDUPTHER

## 2017-06-28 ENCOUNTER — OFFICE VISIT (OUTPATIENT)
Dept: FAMILY MEDICINE CLINIC | Age: 52
End: 2017-06-28

## 2017-06-28 VITALS
HEART RATE: 66 BPM | HEIGHT: 69 IN | WEIGHT: 128 LBS | DIASTOLIC BLOOD PRESSURE: 71 MMHG | SYSTOLIC BLOOD PRESSURE: 106 MMHG | BODY MASS INDEX: 18.96 KG/M2 | TEMPERATURE: 98.1 F | OXYGEN SATURATION: 99 % | RESPIRATION RATE: 16 BRPM

## 2017-06-28 DIAGNOSIS — M25.561 RIGHT KNEE PAIN, UNSPECIFIED CHRONICITY: Primary | ICD-10-CM

## 2017-06-28 NOTE — PROGRESS NOTES
HPI:  Abida Martin is a 46 y.o. female who presents with right  knee pain for 2 weeks. She was doing yoga and had her knee fully flexed and hip externally rotated with her right foot sitting in her left groin while standing balancing on her left leg. She then went to do a one leg squat with her left leg. As she bent down, she felt a sharp pain in the posterior knee and felt a snap. Pain has improved. No swelling. No weakness. She is able to walk without pain. She has not been able to return to full yoga routine because she has pain when she has her knee fully flexed and tibia internally rotated such as the position when she was injured. She is able to do other activities without pain and only has pain when the knee is in the described position. Denies locking, catching or giving way. Past Medical History:   Diagnosis Date    Acid indigestion     Esophagitis     Fast heart beat     Gallbladder attack     Gilbert syndrome     Hypercholesterolemia     Stomach pain        Current Outpatient Prescriptions:     tretinoin (RETIN-A) 0.05 % topical cream, Apply  to affected area nightly., Disp: 135 g, Rfl: 1    DENTA 5000 PLUS 1.1 % crea, USE TWICE A DAY AS DIRECTED, Disp: , Rfl: 3    peg 400-propylene glycol (SYSTANE) 0.4-0.3 % Drop, Administer  to left eye as needed. , Disp: , Rfl:     cetirizine (ZYRTEC) 10 mg tablet, , Disp: , Rfl:   Allergies   Allergen Reactions    Amoxicillin Swelling    Pcn [Penicillins] Swelling     Past Medical History:   Diagnosis Date    Acid indigestion     Esophagitis     Fast heart beat     Gallbladder attack     Gilbert syndrome     Hypercholesterolemia     Stomach pain      Family History   Problem Relation Age of Onset    Cancer Father     Ovarian Cancer Maternal Aunt 60       ROS: As per HPI otherwise negative.     Objective:   Visit Vitals    /71 (BP 1 Location: Left arm, BP Patient Position: Sitting)    Pulse 66    Temp 98.1 °F (36.7 °C) (Oral)    Resp 16    Ht 5' 9\" (1.753 m)    Wt 128 lb (58.1 kg)    LMP 08/06/2009    SpO2 99%    BMI 18.9 kg/m2     Gen: Well appearing. No apparent distress. Alert and oriented. Responds to all questions appropriately. Lungs: No labored respirations. Talking in complete sentences without difficulty. Musculoskeletal:  Knee: right  Knee Effusion: None  Quadriceps atrophy: None   Popliteal (Bakers) Cyst: Negative   Patellofemoral crepitus: Negative    ROM:  Flexion: 130  Extension: 0   Hip IR/ER: FROM without pain    Dynamic Test:  Gait: Normal   Assistive devices: None    Palpation:   Patella tenderness: None  Patellar tendon tenderness: None  Quad tendon tenderness: None  Medial joint line tenderness: None  Lateral joint line tenderness: None  MCL tenderness: None  LCL tenderness: None  Medial facet tenderness: None  Lateral facet tenderness: None  Condyle tenderness: None  Tibia tubercle tenderness: None  Proximal fibula tenderness: None  Plica tenderness: None  Prepatellar bursa tenderness: None  Pes bursa tenderness: None  ITB tenderness: None    Ligament/Meniscal Exam:  Patellar Grind: Negative   Patellar apprehension (medial/lateral): Negative   Lochman: Negative, with good endpoint   Anterior Drawer: Negative   Posterior Drawer: Negative   Valgus stress: Negative with good endpoint   Varus stress: Negative with good endpoint   Regan: Negative   Thessaly: Negative  Gricelda sign: Negative. Pain with knee at the end of flexion and tibia internally rotated. Strength (0-5/5):   Flexion: Left: 5/5    Right: 5/5    Extension: Left: 5/5    Right: 5/5    Hip abduction: 5/5    Hip adduction: 5/5      Neuro/Vascular : Pulses intact, no edema, and neurologically intact . Skin: No obvious rash or skin breakdown. Imaging: Radiographs of the right knee personally reviewed and demonstrates no obvious fracture or dislocation. Mild degenerative changes. ASSESSMENT:    ICD-10-CM ICD-9-CM    1.  Right knee pain, unspecified chronicity M25.561 719.46 XR KNEE RT MIN 4 V   Possible popliteus injury. Also possible for posterior meniscal injury but exam WNL and currently no mechanical sx. PLAN:    1. Home Exercise Program as per handout. Can continue to exercise but avoid full flexion of the knee underload and with rotation of tibia. 2. Ice 15 minutes, three times a day PRN and after exercise. Can alternate with heat for 15 minutes. Medications:    1. Naproxin (Aleve): 220mg 1-2 tablets twice a day PRN. 2. Acetaminophen (Tylenol):  500mg 1-2 tablets every 6 hours as needed for pain. RTC: 4 weeks if not improving. Will consider MRI if no change in sx.

## 2017-06-28 NOTE — PROGRESS NOTES
Chief Complaint   Patient presents with    Knee Injury     pt states she heard a snap in rt knee during yoga about 2 weeks      1. Have you been to the ER, urgent care clinic since your last visit? Hospitalized since your last visit? No    2. Have you seen or consulted any other health care providers outside of the [facility] since your last visit? Include any pap smears or colon screening.  No

## 2017-06-28 NOTE — MR AVS SNAPSHOT
Visit Information Date & Time Provider Department Dept. Phone Encounter #  
 6/28/2017  2:15 PM Tana Valdez [phone] [id number] Upcoming Health Maintenance Date Due FOBT Q 1 YEAR AGE 50-75 8/7/2017 PAP AKA CERVICAL CYTOLOGY 9/22/2017 INFLUENZA AGE 9 TO ADULT 8/1/2017 BREAST CANCER SCRN MAMMOGRAM 2/3/2019 DTaP/Tdap/Td series (2 - Td) 4/25/2021 Allergies as of 6/28/2017  Review Complete On: 3/8/2017 By: Bryanna Lau MD  
  
 Severity Noted Reaction Type Reactions Amoxicillin  08/06/2010    Swelling Pcn [Penicillins]  05/10/2010    Swelling Current Immunizations  Reviewed on 11/10/2014 Name Date Influenza Vaccine PF 11/10/2014 TDAP Vaccine 4/25/2011 Not reviewed this visit You Were Diagnosed With   
  
 Codes Comments Right knee pain, unspecified chronicity    -  Primary ICD-10-CM: M25.561 ICD-9-CM: 719.46 Vitals BP Pulse Temp Resp Height(growth percentile) Weight(growth percentile) 106/71 (BP 1 Location: Left arm, BP Patient Position: Sitting) 66 98.1 °F (36.7 °C) (Oral) 16 5' 9\" (1.753 m) 128 lb (58.1 kg) LMP SpO2 BMI OB Status Smoking Status 08/06/2009 99% 18.9 kg/m2 Postmenopausal Never Smoker Vitals History BMI and BSA Data Body Mass Index Body Surface Area 18.9 kg/m 2 1.68 m 2 Preferred Pharmacy Pharmacy Name Phone [address] [facility] [phone] Your Updated Medication List  
  
   
This list is accurate as of: 6/28/17  4:34 PM.  Always use your most recent med list.  
  
  
  
  
 cetirizine 10 mg tablet Commonly known as:  ZYRTEC  
  
 DENTA 5000 PLUS 1.1 % Crea Generic drug:  sodium fluoride USE TWICE A DAY AS DIRECTED SYSTANE (PROPYLENE GLYCOL) 0.4-0.3 % Drop Generic drug:  peg 400-propylene glycol Administer  to left eye as needed. tretinoin 0.05 % topical cream  
Commonly known as:  RETIN-A Apply  to affected area nightly. To-Do List   
 06/28/2017 Imaging:  XR KNEE RT MIN 4 V Introducing [facility]! Dear Keya Fernandez: Thank you for requesting a [employer] account. Our records indicate that you already have an active [employer] account. You can access your account anytime at [email or web address]. [email or web address] Did you know that you can access your hospital and ER discharge instructions at any time in [employer]? You can also review all of your test results from your hospital stay or ER visit. Additional Information If you have questions, please visit the Frequently Asked Questions section of the [employer] website at [email or web address] Remember, [employer] is NOT to be used for urgent needs. For medical emergencies, dial 911. Now available from your iPhone and Android! Please provide this summary of care documentation to your next provider. Your primary care clinician is listed as Keshav Noland. If you have any questions after today's visit, please call [phone].

## 2017-11-09 ENCOUNTER — TELEPHONE (OUTPATIENT)
Dept: FAMILY MEDICINE CLINIC | Age: 52
End: 2017-11-09

## 2017-11-09 NOTE — TELEPHONE ENCOUNTER
Patient states specialist office is requesting a new referral. Patient states she spoke to them on last week.     Patient have appt 11/14/17      Sched Appt Date/Time   Date Time   Feb 14, 2017  1:15 PM   Procedure Information   Procedure Modifiers Provider Requested Approved   [id number] - REFERRAL TO DERMATOLOGY   1 1      Diagnosis Information   Diagnosis   Z80.8 (ICD-10-CM) - FH: skin cancer            Dermatology DO miri Avila

## 2017-11-10 DIAGNOSIS — Z80.8 FAMILY HISTORY OF MALIGNANT NEOPLASM OF OTHER ORGANS OR SYSTEMS (CODE): Primary | ICD-10-CM

## 2018-01-02 ENCOUNTER — OFFICE VISIT (OUTPATIENT)
Dept: FAMILY MEDICINE CLINIC | Age: 53
End: 2018-01-02

## 2018-01-02 VITALS
HEIGHT: 69 IN | RESPIRATION RATE: 16 BRPM | TEMPERATURE: 97.4 F | OXYGEN SATURATION: 100 % | SYSTOLIC BLOOD PRESSURE: 106 MMHG | HEART RATE: 63 BPM | DIASTOLIC BLOOD PRESSURE: 65 MMHG

## 2018-01-02 DIAGNOSIS — R22.31 AXILLARY LUMP, RIGHT: Primary | ICD-10-CM

## 2018-01-02 NOTE — MR AVS SNAPSHOT
Visit Information Date & Time Provider Department Dept. Phone Encounter #  
 1/2/2018  6:45 PM Anita Gloria MD [address] [place] [address] [phone] [id number] Follow-up Instructions Return in about 4 weeks (around 1/30/2018) for Follow up visit and CPE with labs. Upcoming Health Maintenance Date Due Influenza Age 5 to Adult 8/1/2017 FOBT Q 1 YEAR AGE 50-75 8/7/2017 PAP AKA CERVICAL CYTOLOGY 9/22/2017 BREAST CANCER SCRN MAMMOGRAM 2/3/2019 DTaP/Tdap/Td series (2 - Td) 4/25/2021 Allergies as of 1/2/2018  Review Complete On: 1/2/2018 By: Anita Gloria MD  
  
 Severity Noted Reaction Type Reactions Amoxicillin  08/06/2010    Swelling Pcn [Penicillins]  05/10/2010    Swelling Current Immunizations  Reviewed on 11/10/2014 Name Date Influenza Vaccine PF 11/10/2014 TDAP Vaccine 4/25/2011 Not reviewed this visit You Were Diagnosed With   
  
 Codes Comments Axillary lump, right    -  Primary ICD-10-CM: R22.31 
ICD-9-CM: 782. 2 Vitals BP Pulse Temp Resp Height(growth percentile) LMP  
 106/65 63 97.4 °F (36.3 °C) (Oral) 16 5' 9\" (1.753 m) 08/06/2009 SpO2 OB Status Smoking Status 100% Postmenopausal Never Smoker Preferred Pharmacy Pharmacy Name Phone [facility] [address] [place], [place] [address]y [address] [facility] [phone] Your Updated Medication List  
  
   
This list is accurate as of: 1/2/18  7:27 PM.  Always use your most recent med list.  
  
  
  
  
 cetirizine 10 mg tablet Commonly known as:  ZYRTEC  
  
 DENTA 5000 PLUS 1.1 % Crea Generic drug:  fluoride (sodium) USE TWICE A DAY AS DIRECTED SYSTANE (PROPYLENE GLYCOL) 0.4-0.3 % Drop Generic drug:  peg 400-propylene glycol Administer  to left eye as needed.   
  
 tretinoin 0.05 % topical cream  
Commonly known as:  RETIN-A  
 Apply  to affected area nightly. Follow-up Instructions Return in about 4 weeks (around 1/30/2018) for Follow up visit and CPE with labs. Introducing [facility]! Dear Tom: Thank you for requesting a [employer] account. Our records indicate that you already have an active [employer] account. You can access your account anytime at [email or web address]. [email or web address] Did you know that you can access your hospital and ER discharge instructions at any time in [employer]? You can also review all of your test results from your hospital stay or ER visit. Additional Information If you have questions, please visit the Frequently Asked Questions section of the [employer] website at [email or web address] Remember, [employer] is NOT to be used for urgent needs. For medical emergencies, dial 911. Now available from your iPhone and Android! Please provide this summary of care documentation to your next provider. Your primary care clinician is listed as Dereck Crain. If you have any questions after today's visit, please call [phone].

## 2018-01-03 NOTE — PROGRESS NOTES
Subjective:   Harpreet Monson is an 46 y.o. female who presents for right axilla bump. HPI  2 weeks ago the pt noted small bump in the right axilla when she was shaving. The bump was not painful, w/o drainage or bleeding. The bump disappeared w/o any intervention. This morning when she was shaving and noticed the smals bump in the same area. The bump was painful only on palpation. No drainage or bleeding form the bump. No fever, no chills. No personal of family history of breast cancer. Allergies - reviewed: Allergies   Allergen Reactions    Amoxicillin Swelling    Pcn [Penicillins] Swelling         Medications - reviewed:  Current Outpatient Prescriptions   Medication Sig    tretinoin (RETIN-A) 0.05 % topical cream Apply  to affected area nightly.  DENTA 5000 PLUS 1.1 % crea USE TWICE A DAY AS DIRECTED    peg 400-propylene glycol (SYSTANE) 0.4-0.3 % Drop Administer  to left eye as needed.  cetirizine (ZYRTEC) 10 mg tablet      No current facility-administered medications for this visit. Past Medical History - reviewed:  Past Medical History:   Diagnosis Date    Acid indigestion     Esophagitis     Fast heart beat     Gallbladder attack     Gilbert syndrome     Hypercholesterolemia     Stomach pain          Past Surgical History - reviewed:  Past Surgical History:   Procedure Laterality Date    ABDOMEN SURGERY PROC UNLISTED      HX CHOLECYSTECTOMY  3/2016    HX CHOLECYSTECTOMY Right     HX GYN  2006    c-sec    HX HEENT  1970    tonsilectomy    HX OTHER SURGICAL  3/8/16    single site laparoscopic cholecystectomy         Family History - reviewed:  Family History   Problem Relation Age of Onset    Cancer Father     Ovarian Cancer Maternal Aunt 61         Social History - reviewed:  Social History     Social History    Marital status:      Spouse name: N/A    Number of children: N/A    Years of education: N/A     Occupational History    Not on file. Social History Main Topics    Smoking status: Never Smoker    Smokeless tobacco: Never Used    Alcohol use No    Drug use: Yes     Special: Benzodiazepines    Sexual activity: Yes     Partners: Male     Birth control/ protection: Condom     Other Topics Concern    Not on file     Social History Narrative    Moved her from [place]    Younger son has autism but was healed of speech problem         Review of Systems   CONSTITUTIONAL: denies fever. Denies chills. CARDIOVASCULAR: denies chest pain. Denies palpitations  RESPIRATORY: denies shortness of breath  SKIN: +Bump under the right axilla      Objective:     Visit Vitals    /65    Pulse 63    Temp 97.4 °F (36.3 °C) (Oral)    Resp 16    Ht 5' 9\" (1.753 m)    LMP 08/06/2009    SpO2 100%       General appearance - alert, well appearing, and in no distress  Chest - clear to auscultation, no wheezes, rales or rhonchi, symmetric air entry  Heart - normal rate, regular rhythm, normal S1, S2, no murmurs, rubs, clicks or gallops  Skin - normal coloration and turgor, no rashes, small ( approx 0.5cmin diameter) hard mobile mass intradermally w/o erythema, drainage of bleeding, midly tender on palpation. Breasts: breasts appear normal, no suspicious masses, no skin or nipple changes or axillary nodes. Assessment:   45 yo female who presents for right axillary bump. ICD-10-CM ICD-9-CM    1. Axillary lump, right R22.31 782. 2            Plan:   · Axillary bump is most likely from the shaving. No signs of infection. Breast exam w/o suspecious lesions. · Advised to watch closely for the next couple of weeks. If worsening of the pain of systemic signs of infection come back to the clinic for reevaluation. If persistent bump will need to get an US. Follow-up Disposition:  Return in about 4 weeks (around 1/30/2018) for Follow up visit and CPE with labs.       I have discussed the diagnosis with the patient and the intended plan as seen in the above orders. The patient has received an after-visit summary and questions were answered concerning future plans. I have discussed medication side effects and warnings with the patient as well. Informed pt to return to the office if new symptoms arise.       Fermín Blum MD  Family Medicine Resident, PGY-2

## 2018-01-03 NOTE — PROGRESS NOTES
Chief Complaint   Patient presents with    Cyst     right axilla times one month     1. Have you been to the ER, urgent care clinic since your last visit? Hospitalized since your last visit? No    2. Have you seen or consulted any other health care providers outside of the [address] [place] [address] since your last visit? Include any pap smears or colon screening.  No

## 2018-04-07 ENCOUNTER — HOSPITAL ENCOUNTER (EMERGENCY)
Age: 53
Discharge: HOME | End: 2018-04-07
Attending: EMERGENCY MEDICINE
Payer: OTHER GOVERNMENT

## 2018-04-07 VITALS
TEMPERATURE: 97.8 F | HEIGHT: 68 IN | SYSTOLIC BLOOD PRESSURE: 115 MMHG | HEART RATE: 64 BPM | DIASTOLIC BLOOD PRESSURE: 68 MMHG | BODY MASS INDEX: 20.16 KG/M2 | WEIGHT: 133 LBS | OXYGEN SATURATION: 97 % | RESPIRATION RATE: 12 BRPM

## 2018-04-07 DIAGNOSIS — R30.0 DYSURIA: Primary | ICD-10-CM

## 2018-04-07 LAB
APPEARANCE UR: CLEAR
BACTERIA URNS QL MICRO: NEGATIVE /HPF
BILIRUB UR QL: NEGATIVE
COLOR UR: ABNORMAL
EPITH CASTS URNS QL MICRO: ABNORMAL /LPF
GLUCOSE UR STRIP.AUTO-MCNC: NEGATIVE MG/DL
HGB UR QL STRIP: ABNORMAL
KETONES UR QL STRIP.AUTO: NEGATIVE MG/DL
LEUKOCYTE ESTERASE UR QL STRIP.AUTO: ABNORMAL
NITRITE UR QL STRIP.AUTO: NEGATIVE
PH UR STRIP: 6.5 [PH] (ref 5–8)
PROT UR STRIP-MCNC: NEGATIVE MG/DL
RBC #/AREA URNS HPF: ABNORMAL /HPF (ref 0–5)
SP GR UR REFRACTOMETRY: <1.005 (ref 1–1.03)
UR CULT HOLD, URHOLD: NORMAL
UROBILINOGEN UR QL STRIP.AUTO: 0.2 EU/DL (ref 0.2–1)
WBC URNS QL MICRO: ABNORMAL /HPF (ref 0–4)

## 2018-04-07 PROCEDURE — 87077 CULTURE AEROBIC IDENTIFY: CPT | Performed by: EMERGENCY MEDICINE

## 2018-04-07 PROCEDURE — 81001 URINALYSIS AUTO W/SCOPE: CPT | Performed by: EMERGENCY MEDICINE

## 2018-04-07 PROCEDURE — 87086 URINE CULTURE/COLONY COUNT: CPT | Performed by: EMERGENCY MEDICINE

## 2018-04-07 PROCEDURE — 87186 SC STD MICRODIL/AGAR DIL: CPT | Performed by: EMERGENCY MEDICINE

## 2018-04-07 PROCEDURE — 99283 EMERGENCY DEPT VISIT LOW MDM: CPT

## 2018-04-07 RX ORDER — NITROFURANTOIN 25; 75 MG/1; MG/1
100 CAPSULE ORAL 2 TIMES DAILY
Qty: 10 CAP | Refills: 0 | Status: SHIPPED | OUTPATIENT
Start: 2018-04-07 | End: 2018-04-12

## 2018-04-07 NOTE — DISCHARGE INSTRUCTIONS
Painful Urination (Dysuria): Care Instructions  Your Care Instructions  Burning pain with urination (dysuria) is a common symptom of a urinary tract infection or other urinary problems. The bladder may become inflamed. This can cause pain when the bladder fills and empties. You may also feel pain if the tube that carries urine from the bladder to the outside of the body (urethra) gets irritated or infected. Sexually transmitted infections (STIs) also may cause pain when you urinate. Sometimes the pain can be caused by things other than an infection. The urethra can be irritated by soaps, perfumes, or foreign objects in the urethra. Kidney stones can cause pain when they pass through the urethra. The cause may be hard to find. You may need tests. Treatment for painful urination depends on the cause. Follow-up care is a key part of your treatment and safety. Be sure to make and go to all appointments, and call your doctor if you are having problems. It's also a good idea to know your test results and keep a list of the medicines you take. How can you care for yourself at home? · Drink extra water for the next day or two. This will help make the urine less concentrated. (If you have kidney, heart, or liver disease and have to limit fluids, talk with your doctor before you increase the amount of fluids you drink.)  · Avoid drinks that are carbonated or have caffeine. They can irritate the bladder. · Urinate often. Try to empty your bladder each time. For women:  · Urinate right after you have sex. · After going to the bathroom, wipe from front to back. · Avoid douches, bubble baths, and feminine hygiene sprays. And avoid other feminine hygiene products that have deodorants. When should you call for help? Call your doctor now or seek immediate medical care if:  ? · You have new symptoms, such as fever, nausea, or vomiting. ? · You have new or worse symptoms of a urinary problem.  For example:  Enrique Rojas have blood or pus in your urine. ¨ You have chills or body aches. ¨ It hurts worse to urinate. ¨ You have groin or belly pain. ¨ You have pain in your back just below your rib cage (the flank area). ? Watch closely for changes in your health, and be sure to contact your doctor if you have any problems. Where can you learn more? Go to http://ruben-brayden.info/. Enter V990 in the search box to learn more about \"Painful Urination (Dysuria): Care Instructions. \"  Current as of: May 12, 2017  Content Version: 11.4  © 0171-2393 [employer]. Care instructions adapted under license by [employer] (which disclaims liability or warranty for this information). If you have questions about a medical condition or this instruction, always ask your healthcare professional. [place] [address] any warranty or liability for your use of this information. We hope that we have addressed all of your medical concerns. The examination and treatment you received in the Emergency Department were for an emergent problem and were not intended as complete care. It is important that you follow up with your healthcare provider(s) for ongoing care. If your symptoms worsen or do not improve as expected, and you are unable to reach your usual health care provider(s), you should return to the Emergency Department. Today's healthcare is undergoing tremendous change, and patient satisfaction surveys are one of the many tools to assess the quality of medical care. You may receive a survey from the [facility] regarding your experience in the Emergency Department. I hope that your experience has been completely positive, particularly the medical care that I provided. As such, please participate in the survey; anything less than excellent does not meet my expectations or intentions.         [address] [facility] and [address] [place] [address] participate in nationally recognized quality of care measures. If your blood pressure is greater than 120/80, as reported below, we urge that you seek medical care to address the potential of high blood pressure, commonly known as hypertension. Hypertension can be hereditary or can be caused by certain medical conditions, pain, stress, or \"white coat syndrome. \"       Please make an appointment with your health care provider(s) for follow up of your Emergency Department visit. VITALS:   Patient Vitals for the past 8 hrs:   Temp Pulse Resp BP SpO2   04/07/18 0323 97.8 °F (36.6 °C) 64 12 115/68 97 %          Thank you for allowing us to provide you with medical care today. We realize that you have many choices for your emergency care needs. Please choose us in the future for any continued health care needs. Vinay Luayuan [facility], [address] [place] [address].   Office: [phone]            Recent Results (from the past 24 hour(s))   URINALYSIS W/MICROSCOPIC    Collection Time: 04/07/18  3:30 AM   Result Value Ref Range    Color YELLOW/STRAW      Appearance CLEAR CLEAR      Specific gravity <1.005 1.003 - 1.030    pH (UA) 6.5 5.0 - 8.0      Protein NEGATIVE  NEG mg/dL    Glucose NEGATIVE  NEG mg/dL    Ketone NEGATIVE  NEG mg/dL    Bilirubin NEGATIVE  NEG      Blood TRACE (A) NEG      Urobilinogen 0.2 0.2 - 1.0 EU/dL    Nitrites NEGATIVE  NEG      Leukocyte Esterase TRACE (A) NEG      WBC 0-4 0 - 4 /hpf    RBC 0-5 0 - 5 /hpf    Epithelial cells FEW FEW /lpf    Bacteria NEGATIVE  NEG /hpf   URINE CULTURE HOLD SAMPLE    Collection Time: 04/07/18  3:30 AM   Result Value Ref Range    Urine culture hold        URINE ON HOLD IN MICROBIOLOGY DEPT FOR 3 DAYS. IF UNPRESERVED URINE IS SUBMITTED, IT CANNOT BE USED FOR ADDITIONAL TESTING AFTER 24 HRS, RECOLLECTION WILL BE REQUIRED. No results found.

## 2018-04-07 NOTE — ED PROVIDER NOTES
HPI Comments: 46 y.o. female with past medical history significant for Blake Prayer syndrome, Hypercholesterolemia, Esophagitis, Cholecystectomy who presents from home with chief complaint of urinary symptoms. Pt reports urinary urgency, frequency and suprapubic \"pressure\" tonight. She states that throughout the day yesterday she held her urine multiple different times due to walking through a museum and traveling via car. She is concerned for possible UTI. She denies recent hx of UTI. No known fever but does have chills. There are no other acute medical concerns at this time. PCP: Quang Gupta MD    Note written by Fidencio Miller, as dictated by Joann Charles MD 4:02 AM    The history is provided by the patient. No  was used. Past Medical History:   Diagnosis Date    Acid indigestion     Esophagitis     Fast heart beat     Gallbladder attack     Gilbert syndrome     Hypercholesterolemia     Stomach pain        Past Surgical History:   Procedure Laterality Date    ABDOMEN SURGERY PROC UNLISTED      HX CHOLECYSTECTOMY  3/2016    HX CHOLECYSTECTOMY Right     HX GYN  2006    c-sec    HX HEENT  1970    tonsilectomy    HX OTHER SURGICAL  3/8/16    single site laparoscopic cholecystectomy         Family History:   Problem Relation Age of Onset    Cancer Father     Ovarian Cancer Maternal Aunt 61       Social History     Social History    Marital status:      Spouse name: N/A    Number of children: N/A    Years of education: N/A     Occupational History    Not on file.      Social History Main Topics    Smoking status: Never Smoker    Smokeless tobacco: Never Used    Alcohol use No    Drug use: Yes     Special: Benzodiazepines    Sexual activity: Yes     Partners: Male     Birth control/ protection: Condom     Other Topics Concern    Not on file     Social History Narrative    Moved her from [place]    Younger son has autism but was healed of speech problem         ALLERGIES: Amoxicillin and Pcn [penicillins]    Review of Systems   Constitutional: Positive for chills. Negative for fever. HENT: Negative for congestion, rhinorrhea and sore throat. Respiratory: Negative for shortness of breath. Cardiovascular: Negative for chest pain. Gastrointestinal: Positive for abdominal pain (suprapubic). Negative for nausea and vomiting. Genitourinary: Positive for frequency and urgency. Neurological: Negative for headaches. All other systems reviewed and are negative.       Vitals:    04/07/18 0323   BP: 115/68   Pulse: 64   Resp: 12   Temp: 97.8 °F (36.6 °C)   SpO2: 97%   Weight: 60.3 kg (133 lb)   Height: 5' 8\" (1.727 m)            Physical Exam   Physical Examination: General appearance - alert, well appearing, and in no distress, oriented to person, place, and time and normal appearing weight  Eyes - pupils equal and reactive, extraocular eye movements intact  Neck - supple, no significant adenopathy  Chest - clear to auscultation, no wheezes, rales or rhonchi, symmetric air entry  Heart - normal rate, regular rhythm, normal S1, S2, no murmurs, rubs, clicks or gallops  Abdomen - soft, nontender, nondistended, no masses or organomegaly  Back exam - full range of motion, no tenderness, palpable spasm or pain on motion  Neurological - alert, oriented, normal speech, no focal findings or movement disorder noted  Musculoskeletal - no joint tenderness, deformity or swelling  Extremities - peripheral pulses normal, no pedal edema, no clubbing or cyanosis  Skin - normal coloration and turgor, no rashes, no suspicious skin lesions noted  MDM  Number of Diagnoses or Management Options  Dysuria:      Amount and/or Complexity of Data Reviewed  Clinical lab tests: ordered and reviewed  Decide to obtain previous medical records or to obtain history from someone other than the patient: yes  Review and summarize past medical records: yes    Patient Progress  Patient progress: improved        ED Course       Procedures    Pt states symptoms have completely resolved. Will d/c with pcp f/u.

## 2018-04-09 LAB
BACTERIA SPEC CULT: ABNORMAL
CC UR VC: ABNORMAL
SERVICE CMNT-IMP: ABNORMAL

## 2018-04-10 RX ORDER — CEPHALEXIN 500 MG/1
500 CAPSULE ORAL 3 TIMES DAILY
Qty: 21 CAP | Refills: 0 | Status: SHIPPED | OUTPATIENT
Start: 2018-04-10 | End: 2018-04-17

## 2018-04-10 NOTE — PROGRESS NOTES
Spoke with patient. Discussed results. She is still with symptoms. [address] for keflex 500 tid x 7 d to pharmacy [facility].   Will stop macrobid

## 2018-06-15 ENCOUNTER — OFFICE VISIT (OUTPATIENT)
Dept: FAMILY MEDICINE CLINIC | Age: 53
End: 2018-06-15

## 2018-06-15 VITALS
OXYGEN SATURATION: 97 % | BODY MASS INDEX: 20.31 KG/M2 | SYSTOLIC BLOOD PRESSURE: 111 MMHG | WEIGHT: 134 LBS | HEART RATE: 70 BPM | RESPIRATION RATE: 16 BRPM | DIASTOLIC BLOOD PRESSURE: 65 MMHG | TEMPERATURE: 98.4 F | HEIGHT: 68 IN

## 2018-06-15 DIAGNOSIS — R35.0 FREQUENCY OF URINATION: Primary | ICD-10-CM

## 2018-06-15 LAB
BILIRUB UR QL STRIP: NEGATIVE
GLUCOSE UR-MCNC: NEGATIVE MG/DL
KETONES P FAST UR STRIP-MCNC: NEGATIVE MG/DL
PH UR STRIP: 6 [PH] (ref 4.6–8)
PROT UR QL STRIP: NEGATIVE
SP GR UR STRIP: 1.02 (ref 1–1.03)
UA UROBILINOGEN AMB POC: NORMAL (ref 0.2–1)
URINALYSIS CLARITY POC: CLEAR
URINALYSIS COLOR POC: YELLOW
URINE BLOOD POC: NEGATIVE
URINE LEUKOCYTES POC: NEGATIVE
URINE NITRITES POC: NEGATIVE

## 2018-06-15 RX ORDER — CEPHALEXIN 500 MG/1
500 CAPSULE ORAL 2 TIMES DAILY
Qty: 20 CAP | Refills: 0 | Status: SHIPPED | OUTPATIENT
Start: 2018-06-15 | End: 2018-06-25

## 2018-06-15 NOTE — PROGRESS NOTES
Increased frequency since last night  Urgency   Suprapubic cramping  Slight twing in bladder at end of     SUBJECTIVE: Kenzie Bhatt is a 46 y.o. female who complains of urinary frequency, urgency and dysuria (cramping suprapubically) x 2 days, without flank pain, fever, chills, or abnormal vaginal discharge or bleeding. States she feels a \"slij                    OBJECTIVE: Appears well, in no apparent distress. Vital signs are normal. The abdomen is soft without tenderness, guarding, mass, rebound or organomegaly. No CVA tenderness or inguinal adenopathy noted. Ref. Range 6/15/2018 19:05   Color (UA POC) Unknown Yellow   Clarity (UA POC) Unknown Clear   Specific gravity (UA POC) Latest Ref Range: 1.001 - 1.035  1.025   pH (UA POC) Latest Ref Range: 4.6 - 8.0  6.0   Protein (UA POC) Latest Ref Range: Negative  Negative   Glucose (UA POC) Latest Ref Range: Negative  Negative   Ketones (UA POC) Latest Ref Range: Negative  Negative   Blood (UA POC) Latest Ref Range: Negative  Negative   Bilirubin (UA POC) Latest Ref Range: Negative  Negative   Urobilinogen (UA POC) Latest Ref Range: 0.2 - 1  0.2 mg/dL   Nitrites (UA POC) Latest Ref Range: Negative  Negative   Leukocyte esterase (UA POC) Latest Ref Range: Negative  Negative     ASSESSMENT: UTI uncomplicated without evidence of pyelonephritis    PLAN: Treatment per orders - also push fluids, may use Pyridium OTC prn. Call or return to clinic prn if these symptoms worsen or fail to improve as anticipated.

## 2018-06-15 NOTE — MR AVS SNAPSHOT
[address] 
[phone] Patient: Verdis Bernheim MRN: [id number] VSA:[id number] Visit Information Date & Time Provider Department Dept. Phone Encounter #  
 6/15/2018  7:00 PM Kahlil Zafar, [address] [facility] [phone] [id number] Follow-up Instructions Return if symptoms worsen or fail to improve. Upcoming Health Maintenance Date Due FOBT Q 1 YEAR AGE 50-75 8/7/2017 PAP AKA CERVICAL CYTOLOGY 9/22/2017 Influenza Age 5 to Adult 8/1/2018 BREAST CANCER SCRN MAMMOGRAM 2/3/2019 DTaP/Tdap/Td series (2 - Td) 4/25/2021 Allergies as of 6/15/2018  Review Complete On: 6/15/2018 By: Kahlil Zafar MD  
  
 Severity Noted Reaction Type Reactions Amoxicillin  08/06/2010    Swelling Pcn [Penicillins]  05/10/2010    Swelling Current Immunizations  Reviewed on 11/10/2014 Name Date Influenza Vaccine PF 11/10/2014 TDAP Vaccine 4/25/2011 Not reviewed this visit You Were Diagnosed With   
  
 Codes Comments Frequency of urination    -  Primary ICD-10-CM: R35.0 ICD-9-CM: 788.41 Vitals BP Pulse Temp Resp Height(growth percentile) Weight(growth percentile) 111/65 (BP 1 Location: Left arm, BP Patient Position: Sitting) 70 98.4 °F (36.9 °C) (Oral) 16 5' 8\" (1.727 m) 134 lb (60.8 kg) LMP SpO2 BMI OB Status Smoking Status 08/06/2009 97% 20.37 kg/m2 Postmenopausal Never Smoker Vitals History BMI and BSA Data Body Mass Index Body Surface Area  
 20.37 kg/m 2 1.71 m 2 Preferred Pharmacy Pharmacy Name Phone [facility] [address]  [facility] [phone] Your Updated Medication List  
  
   
This list is accurate as of 6/15/18  7:17 PM.  Always use your most recent med list.  
  
  
  
  
 cephALEXin 500 mg capsule Commonly known as:  David Clonts Take 1 Cap by mouth two (2) times a day for 10 days. cetirizine 10 mg tablet Commonly known as:  ZYRTEC  
  
 DENTA 5000 PLUS 1.1 % Crea Generic drug:  fluoride (sodium) USE TWICE A DAY AS DIRECTED SYSTANE (PROPYLENE GLYCOL) 0.4-0.3 % Drop Generic drug:  peg 400-propylene glycol Administer  to left eye as needed. tretinoin 0.05 % topical cream  
Commonly known as:  RETIN-A Apply  to affected area nightly. Prescriptions Sent to Pharmacy Refills  
 cephALEXin (KEFLEX) 500 mg capsule 0 Sig: Take 1 Cap by mouth two (2) times a day for 10 days. Class: Normal  
 Pharmacy: [facility] [address] [place] [address] [address] AT [facility] [address] ([facility] Ph #: [phone] Route: Oral  
  
We Performed the Following AMB POC URINALYSIS DIP STICK AUTO W/O MICRO [14262 CPT(R)] CULTURE, URINE [id number] CPT(R)] Follow-up Instructions Return if symptoms worsen or fail to improve. Patient Instructions Painful Urination (Dysuria): Care Instructions Your Care Instructions Burning pain with urination (dysuria) is a common symptom of a urinary tract infection or other urinary problems. The bladder may become inflamed. This can cause pain when the bladder fills and empties. You may also feel pain if the tube that carries urine from the bladder to the outside of the body (urethra) gets irritated or infected. Sexually transmitted infections (STIs) also may cause pain when you urinate. Sometimes the pain can be caused by things other than an infection. The urethra can be irritated by soaps, perfumes, or foreign objects in the urethra. Kidney stones can cause pain when they pass through the urethra. The cause may be hard to find. You may need tests. Treatment for painful urination depends on the cause. Follow-up care is a key part of your treatment and safety.  Be sure to make and go to all appointments, and call your doctor if you are having problems. It's also a good idea to know your test results and keep a list of the medicines you take. How can you care for yourself at home? · Drink extra water for the next day or two. This will help make the urine less concentrated. (If you have kidney, heart, or liver disease and have to limit fluids, talk with your doctor before you increase the amount of fluids you drink.) · Avoid drinks that are carbonated or have caffeine. They can irritate the bladder. · Urinate often. Try to empty your bladder each time. For women: · Urinate right after you have sex. · After going to the bathroom, wipe from front to back. · Avoid douches, bubble baths, and feminine hygiene sprays. And avoid other feminine hygiene products that have deodorants. When should you call for help? Call your doctor now or seek immediate medical care if: 
? · You have new symptoms, such as fever, nausea, or vomiting. ? · You have new or worse symptoms of a urinary problem. For example: ¨ You have blood or pus in your urine. ¨ You have chills or body aches. ¨ It hurts worse to urinate. ¨ You have groin or belly pain. ¨ You have pain in your back just below your rib cage (the flank area). ? Watch closely for changes in your health, and be sure to contact your doctor if you have any problems. Where can you learn more? Go to http://ruben-brayden.info/. Enter C182 in the search box to learn more about \"Painful Urination (Dysuria): Care Instructions. \" Current as of: May 12, 2017 Content Version: 11.4 © 1745-2672 [employer]. Care instructions adapted under license by [employer] (which disclaims liability or warranty for this information).  If you have questions about a medical condition or this instruction, always ask your healthcare professional. [employer] disclaims any warranty or liability for your use of this information. Introducing [facility]! Dear Manan Moscoso: Thank you for requesting a [employer] account. Our records indicate that you already have an active [employer] account. You can access your account anytime at [email or web address] Did you know that you can access your hospital and ER discharge instructions at any time in [employer]? You can also review all of your test results from your hospital stay or ER visit. Additional Information If you have questions, please visit the Frequently Asked Questions section of the [employer] website at [email or web address] Remember, [employer] is NOT to be used for urgent needs. For medical emergencies, dial 911. Now available from your iPhone and Android! Please provide this summary of care documentation to your next provider. Your primary care clinician is listed as Anh Sands. If you have any questions after today's visit, please call [phone].

## 2018-06-15 NOTE — PATIENT INSTRUCTIONS
Painful Urination (Dysuria): Care Instructions  Your Care Instructions  Burning pain with urination (dysuria) is a common symptom of a urinary tract infection or other urinary problems. The bladder may become inflamed. This can cause pain when the bladder fills and empties. You may also feel pain if the tube that carries urine from the bladder to the outside of the body (urethra) gets irritated or infected. Sexually transmitted infections (STIs) also may cause pain when you urinate. Sometimes the pain can be caused by things other than an infection. The urethra can be irritated by soaps, perfumes, or foreign objects in the urethra. Kidney stones can cause pain when they pass through the urethra. The cause may be hard to find. You may need tests. Treatment for painful urination depends on the cause. Follow-up care is a key part of your treatment and safety. Be sure to make and go to all appointments, and call your doctor if you are having problems. It's also a good idea to know your test results and keep a list of the medicines you take. How can you care for yourself at home? · Drink extra water for the next day or two. This will help make the urine less concentrated. (If you have kidney, heart, or liver disease and have to limit fluids, talk with your doctor before you increase the amount of fluids you drink.)  · Avoid drinks that are carbonated or have caffeine. They can irritate the bladder. · Urinate often. Try to empty your bladder each time. For women:  · Urinate right after you have sex. · After going to the bathroom, wipe from front to back. · Avoid douches, bubble baths, and feminine hygiene sprays. And avoid other feminine hygiene products that have deodorants. When should you call for help? Call your doctor now or seek immediate medical care if:  ? · You have new symptoms, such as fever, nausea, or vomiting. ? · You have new or worse symptoms of a urinary problem.  For example:  Juan Miguel Tomlinson have blood or pus in your urine. ¨ You have chills or body aches. ¨ It hurts worse to urinate. ¨ You have groin or belly pain. ¨ You have pain in your back just below your rib cage (the flank area). ? Watch closely for changes in your health, and be sure to contact your doctor if you have any problems. Where can you learn more? Go to http://ruben-brayden.info/. Enter A440 in the search box to learn more about \"Painful Urination (Dysuria): Care Instructions. \"  Current as of: May 12, 2017  Content Version: 11.4  © 4599-7617 [employer]. Care instructions adapted under license by [employer] (which disclaims liability or warranty for this information). If you have questions about a medical condition or this instruction, always ask your healthcare professional. [address] any warranty or liability for your use of this information.

## 2018-06-18 ENCOUNTER — TELEPHONE (OUTPATIENT)
Dept: FAMILY MEDICINE CLINIC | Age: 53
End: 2018-06-18

## 2018-06-18 LAB — BACTERIA UR CULT: NORMAL

## 2018-06-18 NOTE — TELEPHONE ENCOUNTER
Please notify patient that her urine culture did not grow bacteria concerning for a UTI and that she may stop her antibiotics. Encourage to continue to drink plenty of fluids. Avoid bubble baths until improved.

## 2018-06-18 NOTE — TELEPHONE ENCOUNTER
I called and spoke with patient and let her know that her culture was negative for bacteria. I stated per Dr Faraz Arzola that she can discontinue the medication. Patient verbalized understanding.

## 2018-08-08 ENCOUNTER — OFFICE VISIT (OUTPATIENT)
Dept: FAMILY MEDICINE CLINIC | Age: 53
End: 2018-08-08

## 2018-08-08 ENCOUNTER — TELEPHONE (OUTPATIENT)
Dept: FAMILY MEDICINE CLINIC | Age: 53
End: 2018-08-08

## 2018-08-08 VITALS
BODY MASS INDEX: 19.88 KG/M2 | OXYGEN SATURATION: 96 % | RESPIRATION RATE: 18 BRPM | TEMPERATURE: 98.3 F | SYSTOLIC BLOOD PRESSURE: 96 MMHG | DIASTOLIC BLOOD PRESSURE: 64 MMHG | WEIGHT: 131.2 LBS | HEART RATE: 70 BPM | HEIGHT: 68 IN

## 2018-08-08 DIAGNOSIS — R35.0 FREQUENCY OF URINATION: ICD-10-CM

## 2018-08-08 DIAGNOSIS — N30.01 ACUTE CYSTITIS WITH HEMATURIA: Primary | ICD-10-CM

## 2018-08-08 LAB
BILIRUB UR QL STRIP: NEGATIVE
GLUCOSE UR-MCNC: NEGATIVE MG/DL
KETONES P FAST UR STRIP-MCNC: NEGATIVE MG/DL
PH UR STRIP: 6 [PH] (ref 4.6–8)
PROT UR QL STRIP: NEGATIVE
SP GR UR STRIP: 1.02 (ref 1–1.03)
UA UROBILINOGEN AMB POC: NORMAL (ref 0.2–1)
URINALYSIS CLARITY POC: NORMAL
URINALYSIS COLOR POC: YELLOW
URINE BLOOD POC: NORMAL
URINE LEUKOCYTES POC: NORMAL
URINE NITRITES POC: NEGATIVE

## 2018-08-08 RX ORDER — SULFAMETHOXAZOLE AND TRIMETHOPRIM 800; 160 MG/1; MG/1
1 TABLET ORAL 2 TIMES DAILY
Qty: 20 TAB | Refills: 0 | Status: SHIPPED | OUTPATIENT
Start: 2018-08-08 | End: 2018-08-18

## 2018-08-08 NOTE — PROGRESS NOTES
Harriet Moreno is a 46 y.o. female  Chief Complaint   Patient presents with    Urinary Frequency     x2 months, using uva ursi to treat, states had been treated previously for uti, states took one cephalexin pill last night 500 mg    Urinary Pain     x 2 days    Back Pain     states had some lower/mid back pain x2 days     Visit Vitals    BP 96/64 (BP 1 Location: Right arm, BP Patient Position: Sitting)    Pulse 70    Temp 98.3 °F (36.8 °C) (Oral)    Resp 18    Ht 5' 8\" (1.727 m)    Wt 131 lb 3.2 oz (59.5 kg)    LMP 08/06/2009    SpO2 96%    BMI 19.95 kg/m2     1. Have you been to the ER, urgent care clinic since your last visit? Hospitalized since your last visit? June 2018 - UTI    2. Have you seen or consulted any other health care providers outside of the [address] [place] [address] since your last visit? Include any pap smears or colon screening.  No  Health Maintenance Due   Topic Date Due    FOBT Q 1 YEAR AGE 50-75  08/07/2017    PAP AKA CERVICAL CYTOLOGY  09/22/2017    Influenza Age 9 to Adult  08/01/2018

## 2018-08-08 NOTE — MR AVS SNAPSHOT
[address] 
[phone] Patient: Deatrice Hodgkins MRN: [id number] JJV:[id number] Visit Information Date & Time Provider Department Dept. Phone Encounter #  
 8/8/2018 11:00 AM Sukhjinder Oneil, 1000 [facility] [phone] [id number] Your Appointments 9/7/2018  2:00 PM  
Complete Physical with Sukhjinder Oneil MD  
[address]) Appt Note: well woman exam  
 9250 [facility] 100[facility]  
[phone]  
  
   
 [facility] [address] [address] Upcoming Health Maintenance Date Due FOBT Q 1 YEAR AGE 50-75 8/7/2017 PAP AKA CERVICAL CYTOLOGY 9/22/2017 Influenza Age 5 to Adult 8/1/2018 BREAST CANCER SCRN MAMMOGRAM 2/3/2019 DTaP/Tdap/Td series (2 - Td) 4/25/2021 Allergies as of 8/8/2018  Review Complete On: 8/8/2018 By: Sukhjinder Oneil MD  
  
 Severity Noted Reaction Type Reactions Amoxicillin  08/06/2010    Swelling Pcn [Penicillins]  05/10/2010    Swelling Tolerated keflex Current Immunizations  Reviewed on 11/10/2014 Name Date Influenza Vaccine PF 11/10/2014 TDAP Vaccine 4/25/2011 Not reviewed this visit You Were Diagnosed With   
  
 Codes Comments Acute cystitis with hematuria    -  Primary ICD-10-CM: N30.01 
ICD-9-CM: 595.0 Frequency of urination     ICD-10-CM: R35.0 ICD-9-CM: 788.41 Vitals BP Pulse Temp Resp Height(growth percentile) Weight(growth percentile) 96/64 (BP 1 Location: Right arm, BP Patient Position: Sitting) 70 98.3 °F (36.8 °C) (Oral) 18 5' 8\" (1.727 m) 131 lb 3.2 oz (59.5 kg) LMP SpO2 BMI OB Status Smoking Status 08/06/2009 96% 19.95 kg/m2 Postmenopausal Never Smoker Vitals History BMI and BSA Data Body Mass Index Body Surface Area  
 19.95 kg/m 2 1.69 m 2 Preferred Pharmacy Pharmacy Name Phone [facility] [address]  [facility] [phone] Your Updated Medication List  
  
   
This list is accurate as of 8/8/18 12:03 PM.  Always use your most recent med list.  
  
  
  
  
 cetirizine 10 mg tablet Commonly known as:  ZYRTEC  
  
 DENTA 5000 PLUS 1.1 % Crea Generic drug:  fluoride (sodium) USE TWICE A DAY AS DIRECTED SYSTANE (PROPYLENE GLYCOL) 0.4-0.3 % Drop Generic drug:  peg 400-propylene glycol Administer  to left eye as needed. tretinoin 0.05 % topical cream  
Commonly known as:  RETIN-A Apply  to affected area nightly. trimethoprim-sulfamethoxazole 160-800 mg per tablet Commonly known as:  BACTRIM DS, SEPTRA DS Take 1 Tab by mouth two (2) times a day for 10 days. Prescriptions Sent to Pharmacy Refills  
 trimethoprim-sulfamethoxazole (BACTRIM DS, SEPTRA DS) 160-800 mg per tablet 0 Sig: Take 1 Tab by mouth two (2) times a day for 10 days. Class: Normal  
 [facility] [place] [facility] [address] AT [facility] of [address] #: [phone] Route: Oral  
  
We Performed the Following AMB POC URINALYSIS DIP STICK AUTO W/O MICRO [37605 CPT(R)] CULTURE, URINE [id number] CPT(R)] Patient Instructions Urinary Tract Infection in Women: Care Instructions Your Care Instructions A urinary tract infection, or UTI, is a general term for an infection anywhere between the kidneys and the urethra (where urine comes out). Most UTIs are bladder infections. They often cause pain or burning when you urinate. UTIs are caused by bacteria and can be cured with antibiotics. Be sure to complete your treatment so that the infection goes away. Follow-up care is a key part of your treatment and safety.  Be sure to make and go to all appointments, and call your doctor if you are having problems. It's also a good idea to know your test results and keep a list of the medicines you take. How can you care for yourself at home? · Take your antibiotics as directed. Do not stop taking them just because you feel better. You need to take the full course of antibiotics. · Drink extra water and other fluids for the next day or two. This may help wash out the bacteria that are causing the infection. (If you have kidney, heart, or liver disease and have to limit fluids, talk with your doctor before you increase your fluid intake.) · Avoid drinks that are carbonated or have caffeine. They can irritate the bladder. · Urinate often. Try to empty your bladder each time. · To relieve pain, take a hot bath or lay a heating pad set on low over your lower belly or genital area. Never go to sleep with a heating pad in place. To prevent UTIs · Drink plenty of water each day. This helps you urinate often, which clears bacteria from your system. (If you have kidney, heart, or liver disease and have to limit fluids, talk with your doctor before you increase your fluid intake.) · Urinate when you need to. · Urinate right after you have sex. · Change sanitary pads often. · Avoid douches, bubble baths, feminine hygiene sprays, and other feminine hygiene products that have deodorants. · After going to the bathroom, wipe from front to back. When should you call for help? Call your doctor now or seek immediate medical care if: 
  · Symptoms such as fever, chills, nausea, or vomiting get worse or appear for the first time.  
  · You have new pain in your back just below your rib cage. This is called flank pain.  
  · There is new blood or pus in your urine.  
  · You have any problems with your antibiotic medicine.  
 Watch closely for changes in your health, and be sure to contact your doctor if: 
  · You are not getting better after taking an antibiotic for 2 days.   · Your symptoms go away but then come back. Where can you learn more? Go to http://ruebn-brayden.info/. Enter T864 in the search box to learn more about \"Urinary Tract Infection in Women: Care Instructions. \" Current as of: May 12, 2017 Content Version: 11.7 © 9874-3072 [employer]. Care instructions adapted under license by [employer] (which disclaims liability or warranty for this information). If you have questions about a medical condition or this instruction, always ask your healthcare professional. [address] any warranty or liability for your use of this information. Introducing \[facility] [place] [facility]\"" & HEALTH SERVICES! Dear Sandra Delgado: Thank you for requesting a [employer] account. Our records indicate that you already have an active [employer] account. You can access your account anytime at [email or web address] Did you know that you can access your hospital and ER discharge instructions at any time in [employer]? You can also review all of your test results from your hospital stay or ER visit. Additional Information If you have questions, please visit the Frequently Asked Questions section of the [employer] website at [email or web address] Remember, SpeedTax is NOT to be used for urgent needs. For medical emergencies, dial 911. Now available from your iPhone and Android! Please provide this summary of care documentation to your next provider. Your primary care clinician is listed as Seamus Stock. If you have any questions after today's visit, please call [phone].

## 2018-08-08 NOTE — PROGRESS NOTES
Rashi Diane  46 y.o. female  1965  [address] [address]  [id number]   [address]: Progress Note  William Bell MD       Encounter Date: 8/8/2018    Chief Complaint   Patient presents with    Urinary Frequency     x2 months, using uva ursi to treat, states had been treated previously for uti, states took one cephalexin pill last night 500 mg    Urinary Pain     x 2 days    Back Pain     states had some lower/mid back pain x2 days     History of Present Illness   Rashi Diane is a 46 y.o. female who presents to clinic today for concern for UTI. S/o urinary frequency for 2 mo. Treated with OSH for UTI with keflex for 10 days which she finished. Then she has noticed some suprapubic pain along with this for 2 days. +warm flush (subjective fever), chills, abn vaginal discharge. +mild back pain. +nocturia, +mild dysuria  Took one pill of keflex from her old prescription. Review of Systems   Review of Systems   Constitutional: Negative for chills and fever. Gastrointestinal: Positive for abdominal pain. Negative for blood in stool, constipation, diarrhea, nausea and vomiting. Genitourinary: Positive for frequency and hematuria. Negative for dysuria, flank pain and urgency. Vitals/Objective:     Vitals:    08/08/18 1122   BP: 96/64   Pulse: 70   Resp: 18   Temp: 98.3 °F (36.8 °C)   TempSrc: Oral   SpO2: 96%   Weight: 131 lb 3.2 oz (59.5 kg)   Height: 5' 8\" (1.727 m)     Body mass index is 19.95 kg/(m^2). Physical Exam   Constitutional: She appears well-developed and well-nourished. No distress. Cardiovascular: Normal rate, regular rhythm and normal heart sounds. Exam reveals no gallop and no friction rub. No murmur heard. Pulmonary/Chest: Effort normal and breath sounds normal. No respiratory distress. She has no wheezes. She has no rales. Abdominal: Soft. Bowel sounds are normal. She exhibits no distension.  There is tenderness (no CVAT cristy; lower suprapubic tenderness to palpation). There is no rebound and no guarding. Skin: She is not diaphoretic. Recent Results (from the past 24 hour(s))   AMB POC URINALYSIS DIP STICK AUTO W/O MICRO    Collection Time: 08/08/18 11:30 AM   Result Value Ref Range    Color (UA POC) Yellow     Clarity (UA POC) Slightly Cloudy     Glucose (UA POC) Negative Negative    Bilirubin (UA POC) Negative Negative    Ketones (UA POC) Negative Negative    Specific gravity (UA POC) 1.020 1.001 - 1.035    Blood (UA POC) Trace Negative    pH (UA POC) 6.0 4.6 - 8.0    Protein (UA POC) Negative Negative    Urobilinogen (UA POC) 0.2 mg/dL 0.2 - 1    Nitrites (UA POC) Negative Negative    Leukocyte esterase (UA POC) 1+ Negative     Assessment and Plan:   1. Frequency of urination  - AMB POC URINALYSIS DIP STICK AUTO W/O MICRO    2. Acute cystitis with hematuria  Await urine culture. Since recently treated with keflex will change to bactrim. - CULTURE, URINE  - trimethoprim-sulfamethoxazole (BACTRIM DS, SEPTRA DS) 160-800 mg per tablet; Take 1 Tab by mouth two (2) times a day for 10 days. Dispense: 20 Tab; Refill: 0      I have discussed the diagnosis with the patient and the intended plan as seen in the above orders. she has expressed understanding. The patient has received an after-visit summary and questions were answered concerning future plans. I have discussed medication side effects and warnings with the patient as well. Follow-up Disposition: Not on File    Electronically Signed: Cr Forbes MD     History   Patients past medical, surgical and family histories were reviewed and updated.     Past Medical History:   Diagnosis Date    Acid indigestion     Esophagitis     Fast heart beat     Gallbladder attack     Gilbert syndrome     Hypercholesterolemia     Stomach pain      Past Surgical History:   Procedure Laterality Date    ABDOMEN SURGERY PROC UNLISTED      HX CHOLECYSTECTOMY 3/2016    HX CHOLECYSTECTOMY Right     HX GYN  2006    c-sec    HX HEENT  1970    tonsilectomy    HX OTHER SURGICAL  3/8/16    single site laparoscopic cholecystectomy     Family History   Problem Relation Age of Onset    Cancer Father     Ovarian Cancer Maternal Aunt 61     Social History     Social History    Marital status:      Spouse name: N/A    Number of children: N/A    Years of education: N/A     Occupational History    Not on file. Social History Main Topics    Smoking status: Never Smoker    Smokeless tobacco: Never Used    Alcohol use No    Drug use: Yes     Special: Benzodiazepines    Sexual activity: Yes     Partners: Male     Birth control/ protection: Condom     Other Topics Concern    Not on file     Social History Narrative    Moved her from [place]    Younger son has autism but was healed of speech problem            Current Medications/Allergies     Current Outpatient Prescriptions   Medication Sig Dispense Refill    trimethoprim-sulfamethoxazole (BACTRIM DS, SEPTRA DS) 160-800 mg per tablet Take 1 Tab by mouth two (2) times a day for 10 days. 20 Tab 0    tretinoin (RETIN-A) 0.05 % topical cream Apply  to affected area nightly. 135 g 1    peg 400-propylene glycol (SYSTANE) 0.4-0.3 % Drop Administer  to left eye as needed.         cetirizine (ZYRTEC) 10 mg tablet       DENTA 5000 PLUS 1.1 % crea USE TWICE A DAY AS DIRECTED  3     Allergies   Allergen Reactions    Amoxicillin Swelling    Pcn [Penicillins] Swelling     Tolerated keflex

## 2018-08-08 NOTE — PATIENT INSTRUCTIONS
Urinary Tract Infection in Women: Care Instructions  Your Care Instructions    A urinary tract infection, or UTI, is a general term for an infection anywhere between the kidneys and the urethra (where urine comes out). Most UTIs are bladder infections. They often cause pain or burning when you urinate. UTIs are caused by bacteria and can be cured with antibiotics. Be sure to complete your treatment so that the infection goes away. Follow-up care is a key part of your treatment and safety. Be sure to make and go to all appointments, and call your doctor if you are having problems. It's also a good idea to know your test results and keep a list of the medicines you take. How can you care for yourself at home? · Take your antibiotics as directed. Do not stop taking them just because you feel better. You need to take the full course of antibiotics. · Drink extra water and other fluids for the next day or two. This may help wash out the bacteria that are causing the infection. (If you have kidney, heart, or liver disease and have to limit fluids, talk with your doctor before you increase your fluid intake.)  · Avoid drinks that are carbonated or have caffeine. They can irritate the bladder. · Urinate often. Try to empty your bladder each time. · To relieve pain, take a hot bath or lay a heating pad set on low over your lower belly or genital area. Never go to sleep with a heating pad in place. To prevent UTIs  · Drink plenty of water each day. This helps you urinate often, which clears bacteria from your system. (If you have kidney, heart, or liver disease and have to limit fluids, talk with your doctor before you increase your fluid intake.)  · Urinate when you need to. · Urinate right after you have sex. · Change sanitary pads often. · Avoid douches, bubble baths, feminine hygiene sprays, and other feminine hygiene products that have deodorants.   · After going to the bathroom, wipe from front to back.  When should you call for help? Call your doctor now or seek immediate medical care if:    · Symptoms such as fever, chills, nausea, or vomiting get worse or appear for the first time.     · You have new pain in your back just below your rib cage. This is called flank pain.     · There is new blood or pus in your urine.     · You have any problems with your antibiotic medicine.    Watch closely for changes in your health, and be sure to contact your doctor if:    · You are not getting better after taking an antibiotic for 2 days.     · Your symptoms go away but then come back. Where can you learn more? Go to http://ruben-brayden.info/. Enter Q522 in the search box to learn more about \"Urinary Tract Infection in Women: Care Instructions. \"  Current as of: May 12, 2017  Content Version: 11.7  © 7574-7619 [employer]. Care instructions adapted under license by [employer] (which disclaims liability or warranty for this information). If you have questions about a medical condition or this instruction, always ask your healthcare professional. [address] any warranty or liability for your use of this information.

## 2018-08-08 NOTE — TELEPHONE ENCOUNTER
----- Message from Guillermina Saint sent at 8/8/2018 12:44 PM EDT -----  Regarding: Dr. Nadeen Garcia / Telephone  Pt stated she forgot to ask the doctor while she was there today if she was also tested for a kidney infection.   Best contact:  [phone]

## 2018-08-09 LAB — BACTERIA UR CULT: NO GROWTH

## 2018-08-10 DIAGNOSIS — R35.0 URINARY FREQUENCY: Primary | ICD-10-CM

## 2018-08-10 NOTE — PROGRESS NOTES
Call: your urine culture is negative. You may stop the antibiotics. Since you are still symptomatic, some additional labs will be placed for you. You can return at your earliest convenience to have these labs done.

## 2018-08-11 ENCOUNTER — TELEPHONE (OUTPATIENT)
Dept: FAMILY MEDICINE CLINIC | Age: 53
End: 2018-08-11

## 2018-08-11 NOTE — TELEPHONE ENCOUNTER
Notified patient per Dr. John Park urine culture is negative. Notified patient per Dr. John Park she can stop the antibiotics. Notified patient per Dr. John Park additional labs have been placed. Notified patient per Dr. John Park to return at earliest convenience to have labs done. Patient verbalized understanding.    Lab appointment scheduled 8/14/18 at 4:15pm.

## 2018-08-13 ENCOUNTER — LAB ONLY (OUTPATIENT)
Dept: FAMILY MEDICINE CLINIC | Age: 53
End: 2018-08-13

## 2018-08-13 DIAGNOSIS — R35.0 URINARY FREQUENCY: ICD-10-CM

## 2018-08-14 ENCOUNTER — TELEPHONE (OUTPATIENT)
Dept: FAMILY MEDICINE CLINIC | Age: 53
End: 2018-08-14

## 2018-08-14 LAB
BUN SERPL-MCNC: 17 MG/DL (ref 6–24)
BUN/CREAT SERPL: 22 (ref 9–23)
CALCIUM SERPL-MCNC: 9.6 MG/DL (ref 8.7–10.2)
CHLORIDE SERPL-SCNC: 100 MMOL/L (ref 96–106)
CO2 SERPL-SCNC: 25 MMOL/L (ref 20–29)
CREAT SERPL-MCNC: 0.77 MG/DL (ref 0.57–1)
EST. AVERAGE GLUCOSE BLD GHB EST-MCNC: 108 MG/DL
GLUCOSE SERPL-MCNC: 91 MG/DL (ref 65–99)
HBA1C MFR BLD: 5.4 % (ref 4.8–5.6)
POTASSIUM SERPL-SCNC: 4.4 MMOL/L (ref 3.5–5.2)
SODIUM SERPL-SCNC: 138 MMOL/L (ref 134–144)

## 2018-08-14 NOTE — PROGRESS NOTES
Call: all of your labs are normal including your test for diabetes. Take some cranberry tabs over the counter or azo tabs to help with your symptoms. If they do not improve, please return to discuss further.

## 2018-08-14 NOTE — TELEPHONE ENCOUNTER
Notified patient of lab results per Dr. Sixto Bowie. Notified patient per Jaun Paul to take some cranberry tabs over the counter or AZO tabs to help with your symptoms. Notified patient per Dr. Sixto Bowie if symptoms do not improve to return to discuss further. Patient verbalized understanding.

## 2018-08-14 NOTE — TELEPHONE ENCOUNTER
Notified patient kidney function was tested and labs were normal per Dr. Rosie Arguelles. Patient verbalized understanding.

## 2018-08-14 NOTE — TELEPHONE ENCOUNTER
/Telephone  Received: Today       Harika FAJARDO fp Front Office                     Pt would like to know if her kidney was tested with the blood work that was done.        Best contact for the pt is [phone]

## 2019-03-15 RX ORDER — TRETINOIN 0.5 MG/G
CREAM TOPICAL
Qty: 135 G | Refills: 0 | Status: SHIPPED | OUTPATIENT
Start: 2019-03-15 | End: 2019-05-28 | Stop reason: SDUPTHER

## 2019-05-28 ENCOUNTER — OFFICE VISIT (OUTPATIENT)
Dept: FAMILY MEDICINE CLINIC | Age: 54
End: 2019-05-28

## 2019-05-28 ENCOUNTER — HOSPITAL ENCOUNTER (OUTPATIENT)
Dept: LAB | Age: 54
Discharge: HOME | End: 2019-05-28
Payer: OTHER GOVERNMENT

## 2019-05-28 VITALS
DIASTOLIC BLOOD PRESSURE: 66 MMHG | BODY MASS INDEX: 19.4 KG/M2 | SYSTOLIC BLOOD PRESSURE: 126 MMHG | WEIGHT: 128 LBS | HEART RATE: 72 BPM | RESPIRATION RATE: 18 BRPM | TEMPERATURE: 98.4 F | OXYGEN SATURATION: 96 % | HEIGHT: 68 IN

## 2019-05-28 DIAGNOSIS — K21.9 GASTROESOPHAGEAL REFLUX DISEASE, ESOPHAGITIS PRESENCE NOT SPECIFIED: ICD-10-CM

## 2019-05-28 DIAGNOSIS — Z01.419 ENCOUNTER FOR WELL WOMAN EXAM: Primary | ICD-10-CM

## 2019-05-28 DIAGNOSIS — R59.0 INGUINAL ADENOPATHY: ICD-10-CM

## 2019-05-28 PROCEDURE — 88175 CYTOPATH C/V AUTO FLUID REDO: CPT

## 2019-05-28 PROCEDURE — 87624 HPV HI-RISK TYP POOLED RSLT: CPT

## 2019-05-28 NOTE — PROGRESS NOTES
HPI:  Yanet Calix is a 48 y.o. female presenting for well woman exam.     No complaints or new concerns. 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  cetirizine (ZYRTEC) 10 mg tablet      No current facility-administered medications for this visit.         Past Medical History- reviewed:  Past Medical History:   Diagnosis Date    Acid indigestion     Esophagitis     Fast heart beat     Gallbladder attack     Gilbert syndrome     Hypercholesterolemia     Stomach pain          Past Surgical History- reviewed:   Past Surgical History:   Procedure Laterality Date    ABDOMEN SURGERY PROC UNLISTED      HX CHOLECYSTECTOMY  3/2016    HX CHOLECYSTECTOMY Right     HX GYN  2006    c-sec    HX HEENT  1970    tonsilectomy    HX OTHER SURGICAL  3/8/16    single site laparoscopic cholecystectomy         Family History - reviewed:  Family History   Problem Relation Age of Onset    Cancer Father     Ovarian Cancer Maternal Aunt 61         Social History - reviewed:  Social History     Socioeconomic History    Marital status:      Spouse name: Not on file    Number of children: Not on file    Years of education: Not on file    Highest education level: Not on file   Occupational History    Not on file   Social Needs    Financial resource strain: Not on file    Food insecurity:     Worry: Not on file     Inability: Not on file    Transportation needs:     Medical: Not on file     Non-medical: Not on file   Tobacco Use    Smoking status: Never Smoker    Smokeless tobacco: Never Used   Substance and Sexual Activity    Alcohol use: No    Drug use: Yes     Types: Benzodiazepines    Sexual activity: Yes     Partners: Male     Birth control/protection: Condom   Lifestyle    Physical activity:     Days per week: Not on file     Minutes per session: Not on file    Stress: Not on file   Relationships    Social connections:     Talks on phone: Not on file     Gets together: Not on file     Attends Quaker service: Not on file     Active member of club or organization: Not on file     Attends meetings of clubs or organizations: Not on file     Relationship status: Not on file    Intimate partner violence:     Fear of current or ex partner: Not on file     Emotionally abused: Not on file     Physically abused: Not on file     Forced sexual activity: Not on file   Other Topics Concern    Not on file   Social History Narrative    Moved her from [place]    Younger son has autism but was healed of speech problem         Immunizations - reviewed:   Immunization History   Administered Date(s) Administered    Influenza Vaccine PF 11/10/2014    TDAP Vaccine 04/25/2011     Health Maintenance reviewed -  Pap smear:  9/2014 negative PAP smear, negative HPV HR  Mammogram:  1/2017 negative    Review of Systems   Reviewed, no complaints today    Physical Exam  Visit Vitals  /66   Pulse 72   Temp 98.4 °F (36.9 °C) (Oral)   Resp 18   Ht 5' 8\" (1.727 m)   Wt 128 lb (58.1 kg)   LMP 08/06/2009   SpO2 96%   BMI 19.46 kg/m²       General appearance - alert, awake, well appearing, and in no distress   Eyes - pupils equal and reactive, EOMI intact  Ears - external ear canals without redness, TMs normal    Nose - normal and patent, no erythema, discharge, or polyps  Mouth - mucous membranes moist, pharynx normal without lesions  Neck - supple, submandibular adenopathy  Chest - clear to auscultation, no wheezes, rales or rhonchi, symmetric air entry   Heart - normal rate, regular rhythm, normal S1S2, no murmurs   Abdomen - soft, nontender, nondistended, no masses or organomegaly  Neurological - alert, oriented, normal speech, no focal findings or movement disorder noted  Musculoskeletal - no joint tenderness, deformity or swelling   Extremities - peripheral pulses normal, no pedal edema, no clubbing or cyanosis   Skin - normal coloration and turgor, no rashes, no suspicious skin lesions noted  Pelvic - atrophic external genitalia normal without rashes or lesion. Pink moist vaginal mucosa. Scant white discharge. Cervix without lesions or abnormal discharge. Uterus non tender and normal size. No adnexal masses or tenderness  Breast - no masses, nontender, no nipple discharge bilaterally; no axillary masses  Inguinal region -- bilateral enlarged masses along inguinal fold, nontender      Assessment/Plan:    ICD-10-CM ICD-9-CM    1. Encounter for well woman exam Z01.419 V72.31 PAP IG, APTIMA HPV AND RFX 16/18,45 ([id number])      LIPID PANEL      CBC W/O DIFF      METABOLIC PANEL, COMPREHENSIVE      VINICIO 3D LUCAS W MAMMO BI SCREENING INCL CAD      TSH 3RD GENERATION   2. Gastroesophageal reflux disease, esophagitis presence not specified K21.9 530.81    3. Inguinal adenopathy R59.0 785.6 US PELV NON OBS      SED RATE (ESR)       Routine Preventive Care  - Counseled regarding diet, exercise and healthy lifestyle  - Routine lab work done today    Discuss with patient:  HIV testing; hep C AB per CDC guidelines; shingles vaccine; DEXA scan due to early menopause        I have discussed the diagnosis with the patient and the intended plan as seen in the above orders. Patient verbalized understanding of the plan and agrees with the plan. The patient has received an after-visit summary and questions were answered concerning future plans.   I have discussed medication side effects and warnings with the patient as well. Informed patient to return to the office if new symptoms arise.         Kareem Aguilar MD

## 2019-05-28 NOTE — TELEPHONE ENCOUNTER
Patient would like more of the cream, she states she has about a week left and that it was discussed that she was still using it.

## 2019-05-28 NOTE — PROGRESS NOTES
Chief Complaint   Patient presents with    Well Woman     1. Have you been to the ER, urgent care clinic since your last visit? Hospitalized since your last visit? No    2. Have you seen or consulted any other health care providers outside of the [address] [place] [address] since your last visit? Include any pap smears or colon screening.  No

## 2019-05-29 LAB
ALBUMIN SERPL-MCNC: 4.8 G/DL (ref 3.5–5.5)
ALBUMIN/GLOB SERPL: 2.2 {RATIO} (ref 1.2–2.2)
ALP SERPL-CCNC: 59 IU/L (ref 39–117)
ALT SERPL-CCNC: 23 IU/L (ref 0–32)
AST SERPL-CCNC: 25 IU/L (ref 0–40)
BILIRUB SERPL-MCNC: 1.8 MG/DL (ref 0–1.2)
BUN SERPL-MCNC: 16 MG/DL (ref 6–24)
BUN/CREAT SERPL: 21 (ref 9–23)
CALCIUM SERPL-MCNC: 9.6 MG/DL (ref 8.7–10.2)
CHLORIDE SERPL-SCNC: 101 MMOL/L (ref 96–106)
CHOLEST SERPL-MCNC: 254 MG/DL (ref 100–199)
CO2 SERPL-SCNC: 23 MMOL/L (ref 20–29)
CREAT SERPL-MCNC: 0.76 MG/DL (ref 0.57–1)
ERYTHROCYTE [DISTWIDTH] IN BLOOD BY AUTOMATED COUNT: 13.7 % (ref 12.3–15.4)
ERYTHROCYTE [SEDIMENTATION RATE] IN BLOOD BY WESTERGREN METHOD: 2 MM/HR (ref 0–40)
GLOBULIN SER CALC-MCNC: 2.2 G/DL (ref 1.5–4.5)
GLUCOSE SERPL-MCNC: 92 MG/DL (ref 65–99)
HCT VFR BLD AUTO: 43.3 % (ref 34–46.6)
HDLC SERPL-MCNC: 113 MG/DL
HGB BLD-MCNC: 14.4 G/DL (ref 11.1–15.9)
INTERPRETATION, 910389: NORMAL
LDLC SERPL CALC-MCNC: 131 MG/DL (ref 0–99)
MCH RBC QN AUTO: 30.7 PG (ref 26.6–33)
MCHC RBC AUTO-ENTMCNC: 33.3 G/DL (ref 31.5–35.7)
MCV RBC AUTO: 92 FL (ref 79–97)
PLATELET # BLD AUTO: 319 X10E3/UL (ref 150–450)
POTASSIUM SERPL-SCNC: 4.6 MMOL/L (ref 3.5–5.2)
PROT SERPL-MCNC: 7 G/DL (ref 6–8.5)
RBC # BLD AUTO: 4.69 X10E6/UL (ref 3.77–5.28)
SODIUM SERPL-SCNC: 139 MMOL/L (ref 134–144)
TRIGL SERPL-MCNC: 48 MG/DL (ref 0–149)
TSH SERPL DL<=0.005 MIU/L-ACNC: 1.4 UIU/ML (ref 0.45–4.5)
VLDLC SERPL CALC-MCNC: 10 MG/DL (ref 5–40)
WBC # BLD AUTO: 4.6 X10E3/UL (ref 3.4–10.8)

## 2019-06-03 RX ORDER — TRETINOIN 0.5 MG/G
CREAM TOPICAL
Qty: 135 G | Refills: 0 | Status: SHIPPED | OUTPATIENT
Start: 2019-06-03 | End: 2019-12-18

## 2019-06-03 NOTE — PROGRESS NOTES
TC \"high\" because \"good\" cholesterol is so good -- your 10 year risk of a cardiovascular event is 1.1% which is low; would not recommend a statin medication at this time. Some cardiologists would recommend doing a CT scan heart looking for calcium deposits -- it is not covered by insurance -- costs around $ 200. With your low risk because of no HTN, obesity, smoker, DM -- no further workup needed at this time    No anemia    Normal glucose    Normal kidney function    Elevated total bilirubin -- not a sensitive test -  Bilirubin can be caused by liver dysfunction, breakdown of blood products, etc.  Usually best to repeat test -- if normal, no further testing required. If abnormal, would do further testing to determine cause. Normal thyroid function    Normal sed rate -- thus no current sign of inflammation or infection    It was nice meeting you -- let me know if you have any further questions. Would plan to repeat bilirubin test when not fasting in 2-3 weeks.

## 2019-06-04 DIAGNOSIS — B35.1 ONYCHOMYCOSIS: ICD-10-CM

## 2019-06-04 DIAGNOSIS — N95.1 MENOPAUSAL STATE: ICD-10-CM

## 2019-06-04 DIAGNOSIS — Z01.419 WELL WOMAN EXAM: Primary | ICD-10-CM

## 2019-06-18 ENCOUNTER — HOSPITAL ENCOUNTER (OUTPATIENT)
Dept: MAMMOGRAPHY | Age: 54
Discharge: HOME | End: 2019-06-18
Attending: FAMILY MEDICINE
Payer: OTHER GOVERNMENT

## 2019-06-18 ENCOUNTER — HOSPITAL ENCOUNTER (OUTPATIENT)
Dept: ULTRASOUND IMAGING | Age: 54
Discharge: HOME | End: 2019-06-18
Attending: FAMILY MEDICINE
Payer: OTHER GOVERNMENT

## 2019-06-18 ENCOUNTER — TELEPHONE (OUTPATIENT)
Dept: FAMILY MEDICINE CLINIC | Age: 54
End: 2019-06-18

## 2019-06-18 DIAGNOSIS — Z01.419 ENCOUNTER FOR WELL WOMAN EXAM: ICD-10-CM

## 2019-06-18 DIAGNOSIS — R59.0 INGUINAL ADENOPATHY: ICD-10-CM

## 2019-06-18 PROCEDURE — 77063 BREAST TOMOSYNTHESIS BI: CPT

## 2019-06-18 PROCEDURE — 76882 US LMTD JT/FCL EVL NVASC XTR: CPT

## 2019-06-18 NOTE — TELEPHONE ENCOUNTER
Mike   Dept. [phone]  Shae Perrin    She needs to speak with the doctor or nurse about the Pelvic and transvaginal orders as there are questions per their conversation with patient.

## 2019-06-19 NOTE — TELEPHONE ENCOUNTER
Patient is now calling and asking to be contacted to discuss orders placed for her and states that there is a issue per ultrasound dept.     Patient asking to be contacted directly as she have concerns    Pt ph [phone]

## 2019-07-17 ENCOUNTER — TELEPHONE (OUTPATIENT)
Dept: FAMILY MEDICINE CLINIC | Age: 54
End: 2019-07-17

## 2019-07-17 DIAGNOSIS — N95.1 MENOPAUSAL STATE: ICD-10-CM

## 2019-07-17 NOTE — TELEPHONE ENCOUNTER
Patient called asking to speak to [employer] stating she had a Mammo done at [facility] but received a bill for the full amount. She contacted her insurance company and was informed she received a bill because no auth was completed. I Explained to her that [employer] does not do the auth's for internal procedures. [employer] took the call.

## 2019-07-17 NOTE — TELEPHONE ENCOUNTER
Spoke with patient & informed her that she need to speak with [facility] dept. ... The test she had done was scheduled by cheryl & they also do the pre-authorization. ... I gave the patient the number to [facility] & she stated that she will give them a call. ...

## 2019-07-17 NOTE — TELEPHONE ENCOUNTER
Patient states Garvey/referrals told her to call with information below regarding mammogram    Fax [phone]  Claim# [id number]    Referral team for code [id number]

## 2019-07-18 NOTE — TELEPHONE ENCOUNTER
Information faxed to insurance claims department for them to review & re-evaluate claim. ... Mammogram 3 D tomography screening was not a covered test.... It will be up to insurance to approve or deny claim once information reviewed. ...   Fax [phone]

## 2019-07-23 NOTE — TELEPHONE ENCOUNTER
Patient calling and asking that Mare/dat be aware that she gave wrong fax number previously to [facility] by 1 digit. Patient states his is reason they didn't get fax you sent.     Correct fax number [phone]  Claim# [id number]    Code# [id number]

## 2019-07-23 NOTE — TELEPHONE ENCOUNTER
Information re-faxed to insurance company using fax # [phone] as per the patient. ... Jane Haydenulding #[id number]. ...

## 2019-07-25 ENCOUNTER — TELEPHONE (OUTPATIENT)
Dept: FAMILY MEDICINE CLINIC | Age: 54
End: 2019-07-25

## 2019-07-25 DIAGNOSIS — Z12.39 BREAST CANCER SCREENING: Primary | ICD-10-CM

## 2019-07-25 NOTE — TELEPHONE ENCOUNTER
Dr Juanita Cameron:    Please disregard previous message. .  Will you enter an order into [facility] for a Mammo 3 D tomography with mammogram bilateral screening. ... In your comment section, add that there was density in breast.... Use procedure code 56813. ... Any questions, call me. ...     Thanks  Everlater B

## 2019-07-25 NOTE — TELEPHONE ENCOUNTER
Dr Tovar Minium: Will you enter an order into [facility] for a Mammo 3 D tomography with mammogram bilateral screening. ... Procedure [id number]:[id number] /[id number]. ... Any questions, call me. ..     Thanks  Buccaneer B

## 2019-08-15 ENCOUNTER — TELEPHONE (OUTPATIENT)
Dept: FAMILY MEDICINE CLINIC | Age: 54
End: 2019-08-15

## 2019-08-15 NOTE — TELEPHONE ENCOUNTER
[phone]    Patient called for a new referral to dermatologist, Dr. Megha Cordoba, for skin check. Said she has seen him many times. Old referral  per their office. Call was taken by referrals.

## 2019-08-21 ENCOUNTER — TELEPHONE (OUTPATIENT)
Dept: FAMILY MEDICINE CLINIC | Age: 54
End: 2019-08-21

## 2019-08-21 NOTE — TELEPHONE ENCOUNTER
Dr Aurea Solis: This patient is scheduled to have a skin check with Dr Farhan Rob (derm) on 8/28/19 @ 3:15 pm.... If ok with you, will you enter an order into [facility] for a Dermatology consult so I can submit a referral to her insurance. ... Any questions, call me. ...     Thanks  Vudu B

## 2019-08-22 DIAGNOSIS — L98.9 SKIN LESIONS: Primary | ICD-10-CM

## 2019-09-04 ENCOUNTER — TELEPHONE (OUTPATIENT)
Dept: FAMILY MEDICINE CLINIC | Age: 54
End: 2019-09-04

## 2019-09-04 NOTE — TELEPHONE ENCOUNTER
Patient ask that Dr. Tonio Rios call her about mammography she had Jackeline.       Call [phone]

## 2019-09-04 NOTE — TELEPHONE ENCOUNTER
[phone]    Neymar Gaston wanted to speak with Viviana Muller to let her know about what she just found out. Kaushik Mandeep had a new code (5833) that started in FEB and she will now contact [facility] to let them know. Said she just wants to speak with Viviana Muller about this.

## 2019-11-08 ENCOUNTER — HOSPITAL ENCOUNTER (EMERGENCY)
Age: 54
Discharge: HOME | End: 2019-11-08
Attending: EMERGENCY MEDICINE
Payer: OTHER GOVERNMENT

## 2019-11-08 VITALS
TEMPERATURE: 98 F | BODY MASS INDEX: 19.4 KG/M2 | WEIGHT: 128 LBS | OXYGEN SATURATION: 97 % | RESPIRATION RATE: 17 BRPM | SYSTOLIC BLOOD PRESSURE: 93 MMHG | HEIGHT: 68 IN | DIASTOLIC BLOOD PRESSURE: 67 MMHG | HEART RATE: 70 BPM

## 2019-11-08 DIAGNOSIS — N30.01 ACUTE CYSTITIS WITH HEMATURIA: Primary | ICD-10-CM

## 2019-11-08 LAB
APPEARANCE UR: ABNORMAL
BACTERIA URNS QL MICRO: ABNORMAL /HPF
BILIRUB UR QL: NEGATIVE
COLOR UR: ABNORMAL
EPITH CASTS URNS QL MICRO: ABNORMAL /LPF
GLUCOSE UR STRIP.AUTO-MCNC: NEGATIVE MG/DL
HGB UR QL STRIP: ABNORMAL
HYALINE CASTS URNS QL MICRO: ABNORMAL /LPF (ref 0–5)
KETONES UR QL STRIP.AUTO: NEGATIVE MG/DL
LEUKOCYTE ESTERASE UR QL STRIP.AUTO: ABNORMAL
NITRITE UR QL STRIP.AUTO: NEGATIVE
PH UR STRIP: 6 [PH] (ref 5–8)
PROT UR STRIP-MCNC: ABNORMAL MG/DL
RBC #/AREA URNS HPF: ABNORMAL /HPF (ref 0–5)
SP GR UR REFRACTOMETRY: 1.02 (ref 1–1.03)
UA: UC IF INDICATED,UAUC: ABNORMAL
UROBILINOGEN UR QL STRIP.AUTO: 0.2 EU/DL (ref 0.2–1)
WBC URNS QL MICRO: >100 /HPF (ref 0–4)

## 2019-11-08 PROCEDURE — 74011250637 HC RX REV CODE- 250/637: Performed by: EMERGENCY MEDICINE

## 2019-11-08 PROCEDURE — 87086 URINE CULTURE/COLONY COUNT: CPT

## 2019-11-08 PROCEDURE — 99284 EMERGENCY DEPT VISIT MOD MDM: CPT

## 2019-11-08 PROCEDURE — 87186 SC STD MICRODIL/AGAR DIL: CPT

## 2019-11-08 PROCEDURE — 87077 CULTURE AEROBIC IDENTIFY: CPT

## 2019-11-08 PROCEDURE — 81001 URINALYSIS AUTO W/SCOPE: CPT

## 2019-11-08 RX ORDER — PHENAZOPYRIDINE HYDROCHLORIDE 100 MG/1
200 TABLET, FILM COATED ORAL
Status: COMPLETED | OUTPATIENT
Start: 2019-11-08 | End: 2019-11-08

## 2019-11-08 RX ORDER — PHENAZOPYRIDINE HYDROCHLORIDE 200 MG/1
200 TABLET, FILM COATED ORAL 3 TIMES DAILY
Qty: 6 TAB | Refills: 0 | Status: SHIPPED | OUTPATIENT
Start: 2019-11-08 | End: 2019-11-08

## 2019-11-08 RX ORDER — PHENAZOPYRIDINE HYDROCHLORIDE 200 MG/1
200 TABLET, FILM COATED ORAL 3 TIMES DAILY
Qty: 6 TAB | Refills: 0 | Status: SHIPPED | OUTPATIENT
Start: 2019-11-08 | End: 2019-11-10

## 2019-11-08 RX ORDER — CEPHALEXIN 500 MG/1
500 CAPSULE ORAL 3 TIMES DAILY
Qty: 30 CAP | Refills: 0 | Status: SHIPPED | OUTPATIENT
Start: 2019-11-08 | End: 2019-11-18

## 2019-11-08 RX ORDER — CEPHALEXIN 250 MG/1
500 CAPSULE ORAL
Status: COMPLETED | OUTPATIENT
Start: 2019-11-08 | End: 2019-11-08

## 2019-11-08 RX ADMIN — PHENAZOPYRIDINE 200 MG: 100 TABLET ORAL at 04:26

## 2019-11-08 RX ADMIN — CEPHALEXIN 500 MG: 250 CAPSULE ORAL at 04:26

## 2019-11-08 NOTE — DISCHARGE INSTRUCTIONS

## 2019-11-08 NOTE — ED PROVIDER NOTES
47 y.o. female with past medical history significant for indigestion, esophagitis, intermittent tachycardia, cholecystitis, Gilbert Syndrome, hypercholesteremia, and chronic abdominal pain who presents from home with chief complaint of urinary pain. The patient notes onset of urinary pain that began approximately 3-4 days ago. She states she has had decreased urinary output as well. She describes this pain as pressure and rates it 7/10. She denies taking medication prior to arrival and there are no alleviating factors. She notes associated symptoms of chills and abdominal pressure. She denies fever, nausea,  vomiting, CP, SOB, back pain, vaginal discharge, vaginal bleeding, flank pain, or dizziness. There are no other acute medical concerns at this time. Social hx: negative alcohol, negative tobacco, negative illicit drug use  Surgical Hx: , tonsillectomy, laparoscopic cholecystectomy,    Allergies: Amoxicillin, Penicillin    PCP: Ghassan Yao MD    Note written by Fidencio Nieves, as dictated by Caterina Negro MD 3:35 AM      The history is provided by the patient. No  was used.         Past Medical History:   Diagnosis Date    Acid indigestion     Esophagitis     Fast heart beat     Gallbladder attack     Gilbert syndrome     Hypercholesterolemia     Stomach pain        Past Surgical History:   Procedure Laterality Date    ABDOMEN SURGERY PROC UNLISTED      HX CHOLECYSTECTOMY  3/2016    HX CHOLECYSTECTOMY Right     HX GYN  2006    c-sec    HX HEENT  1970    tonsilectomy    HX OTHER SURGICAL  3/8/16    single site laparoscopic cholecystectomy         Family History:   Problem Relation Age of Onset    Cancer Father     Ovarian Cancer Maternal Aunt 61       Social History     Socioeconomic History    Marital status:      Spouse name: Not on file    Number of children: Not on file    Years of education: Not on file    Highest education level: Not on file   Occupational History    Not on file   Social Needs    Financial resource strain: Not on file    Food insecurity:     Worry: Not on file     Inability: Not on file    Transportation needs:     Medical: Not on file     Non-medical: Not on file   Tobacco Use    Smoking status: Never Smoker    Smokeless tobacco: Never Used   Substance and Sexual Activity    Alcohol use: No    Drug use: Not Currently     Types: Benzodiazepines    Sexual activity: Yes     Partners: Male     Birth control/protection: Condom   Lifestyle    Physical activity:     Days per week: Not on file     Minutes per session: Not on file    Stress: Not on file   Relationships    Social connections:     Talks on phone: Not on file     Gets together: Not on file     Attends Christian service: Not on file     Active member of club or organization: Not on file     Attends meetings of clubs or organizations: Not on file     Relationship status: Not on file    Intimate partner violence:     Fear of current or ex partner: Not on file     Emotionally abused: Not on file     Physically abused: Not on file     Forced sexual activity: Not on file   Other Topics Concern    Not on file   Social History Narrative    Moved her from [place]    Younger son has autism but was healed of speech problem         ALLERGIES: Amoxicillin and Pcn [penicillins]    Review of Systems   Constitutional: Positive for chills. Negative for fever. Respiratory: Negative for shortness of breath. Cardiovascular: Negative for chest pain. Gastrointestinal: Negative for nausea and vomiting. Positive for abdominal pressure   Genitourinary: Positive for decreased urine volume and dysuria. Negative for flank pain, vaginal bleeding and vaginal discharge. Musculoskeletal: Negative for back pain. Neurological: Negative for dizziness. All other systems reviewed and are negative.       Vitals:    11/08/19 0328   BP: 100/60   Pulse: 68   Resp: 16   Temp: 97.7 °F (36.5 °C)   SpO2: 97%   Weight: 58.1 kg (128 lb)   Height: 5' 8\" (1.727 m)            Physical Exam   Nursing note and vitals reviewed. CONSTITUTIONAL: Well-appearing; well-nourished; in no apparent distress  HEAD: Normocephalic; atraumatic  EYES: PERRL; EOM intact; conjunctiva and sclera are clear bilaterally. ENT: No rhinorrhea; normal pharynx with no tonsillar hypertrophy; mucous membranes pink/moist, no erythema, no exudate. NECK: Supple; non-tender; no cervical lymphadenopathy  CARD: Normal S1, S2; no murmurs, rubs, or gallops. Regular rate and rhythm. RESP: Normal respiratory effort; breath sounds clear and equal bilaterally; no wheezes, rhonchi, or rales. ABD: Normal bowel sounds; non-distended; non-tender; no palpable organomegaly, no masses, no bruits. Back Exam: Normal inspection; no vertebral point tenderness, no CVA tenderness. Normal range of motion. EXT: Normal ROM in all four extremities; non-tender to palpation; no swelling or deformity; distal pulses are normal, no edema. SKIN: Warm; dry; no rash. NEURO:Alert and oriented x 3, coherent, MARV-XII grossly intact, sensory and motor are non-focal.        MDM  Number of Diagnoses or Management Options  Diagnosis management comments: Assessment: Dysuria rule out UTI/interstitial cystitis    Plan: Lab/analgesia/education, reassurance, symptomatic treatment/serial exam/ Monitor and Reevaluate. Procedures      Progress Note:   Pt has been reexamined by Maria Ines Sosa MD. Pt is feeling much better. Symptoms have improved. All available results have been reviewed with pt and any available family. Pt understands sx, dx, and tx in ED. Care plan has been outlined and questions have been answered. Pt is ready to go home. Will send home on UTI instruction. Prescription of Keflex and Pyridium. Outpatient referral with PCP as needed. Written by Maria Ines Sosa MD,4:36 AM    .   .

## 2019-11-11 LAB
BACTERIA SPEC CULT: ABNORMAL
BACTERIA SPEC CULT: ABNORMAL
CC UR VC: ABNORMAL
SERVICE CMNT-IMP: ABNORMAL

## 2019-11-25 ENCOUNTER — TELEPHONE (OUTPATIENT)
Dept: FAMILY MEDICINE CLINIC | Age: 54
End: 2019-11-25

## 2019-11-25 DIAGNOSIS — M54.50 LOW BACK PAIN, UNSPECIFIED BACK PAIN LATERALITY, UNSPECIFIED CHRONICITY, UNSPECIFIED WHETHER SCIATICA PRESENT: Primary | ICD-10-CM

## 2019-11-25 NOTE — TELEPHONE ENCOUNTER
Patient is calling to speak to [facility] about getting a PA with her  for an urgent care apt she has yesterday Sunday 11.24.19    Please advise if needed     thanks

## 2019-11-25 NOTE — TELEPHONE ENCOUNTER
Dr Shelly Carrera: This patient went to [facility] on 11/24/19 for low back pain, chills, & bladder pressure. ... if ok with you, will you enter an order into [facility] for an Urgent Care consult so I can submit a referral to her insurance to cover this visit????? Any questions, call me. ...    Ryann BERRY

## 2019-12-18 ENCOUNTER — APPOINTMENT (OUTPATIENT)
Dept: NON INVASIVE DIAGNOSTICS | Age: 54
End: 2019-12-18
Attending: EMERGENCY MEDICINE
Payer: OTHER GOVERNMENT

## 2019-12-18 ENCOUNTER — HOSPITAL ENCOUNTER (EMERGENCY)
Age: 54
Discharge: HOME | End: 2019-12-18
Attending: EMERGENCY MEDICINE
Payer: OTHER GOVERNMENT

## 2019-12-18 ENCOUNTER — APPOINTMENT (OUTPATIENT)
Dept: GENERAL RADIOLOGY | Age: 54
End: 2019-12-18
Attending: EMERGENCY MEDICINE
Payer: OTHER GOVERNMENT

## 2019-12-18 VITALS
HEART RATE: 67 BPM | BODY MASS INDEX: 19.7 KG/M2 | HEIGHT: 68 IN | SYSTOLIC BLOOD PRESSURE: 100 MMHG | WEIGHT: 130 LBS | DIASTOLIC BLOOD PRESSURE: 60 MMHG | OXYGEN SATURATION: 95 % | RESPIRATION RATE: 13 BRPM | TEMPERATURE: 98.1 F

## 2019-12-18 DIAGNOSIS — R07.9 CHEST PAIN, UNSPECIFIED TYPE: Primary | ICD-10-CM

## 2019-12-18 LAB
ALBUMIN SERPL-MCNC: 3.8 G/DL (ref 3.5–5)
ALBUMIN/GLOB SERPL: 1.4 {RATIO} (ref 1.1–2.2)
ALP SERPL-CCNC: 54 U/L (ref 45–117)
ALT SERPL-CCNC: 29 U/L (ref 12–78)
ANION GAP SERPL CALC-SCNC: 3 MMOL/L (ref 5–15)
AST SERPL-CCNC: 17 U/L (ref 15–37)
ATRIAL RATE: 59 BPM
BASOPHILS # BLD: 0 K/UL (ref 0–0.1)
BASOPHILS NFR BLD: 1 % (ref 0–1)
BILIRUB SERPL-MCNC: 0.5 MG/DL (ref 0.2–1)
BNP SERPL-MCNC: 32 PG/ML
BUN SERPL-MCNC: 26 MG/DL (ref 6–20)
BUN/CREAT SERPL: 39 (ref 12–20)
CALCIUM SERPL-MCNC: 8.3 MG/DL (ref 8.5–10.1)
CALCULATED P AXIS, ECG09: 50 DEGREES
CALCULATED R AXIS, ECG10: 56 DEGREES
CALCULATED T AXIS, ECG11: -4 DEGREES
CHLORIDE SERPL-SCNC: 108 MMOL/L (ref 97–108)
CO2 SERPL-SCNC: 28 MMOL/L (ref 21–32)
COMMENT, HOLDF: NORMAL
CREAT SERPL-MCNC: 0.66 MG/DL (ref 0.55–1.02)
D DIMER PPP FEU-MCNC: 0.26 MG/L FEU (ref 0–0.65)
DIAGNOSIS, 93000: NORMAL
DIFFERENTIAL METHOD BLD: ABNORMAL
EOSINOPHIL # BLD: 0.3 K/UL (ref 0–0.4)
EOSINOPHIL NFR BLD: 6 % (ref 0–7)
ERYTHROCYTE [DISTWIDTH] IN BLOOD BY AUTOMATED COUNT: 12.7 % (ref 11.5–14.5)
GLOBULIN SER CALC-MCNC: 2.8 G/DL (ref 2–4)
GLUCOSE SERPL-MCNC: 100 MG/DL (ref 65–100)
HCT VFR BLD AUTO: 41.3 % (ref 35–47)
HGB BLD-MCNC: 13.8 G/DL (ref 11.5–16)
IMM GRANULOCYTES # BLD AUTO: 0 K/UL (ref 0–0.04)
IMM GRANULOCYTES NFR BLD AUTO: 0 % (ref 0–0.5)
LYMPHOCYTES # BLD: 2 K/UL (ref 0.8–3.5)
LYMPHOCYTES NFR BLD: 45 % (ref 12–49)
MAGNESIUM SERPL-MCNC: 2.3 MG/DL (ref 1.6–2.4)
MCH RBC QN AUTO: 30.8 PG (ref 26–34)
MCHC RBC AUTO-ENTMCNC: 33.4 G/DL (ref 30–36.5)
MCV RBC AUTO: 92.2 FL (ref 80–99)
MONOCYTES # BLD: 0.4 K/UL (ref 0–1)
MONOCYTES NFR BLD: 9 % (ref 5–13)
NEUTS SEG # BLD: 1.7 K/UL (ref 1.8–8)
NEUTS SEG NFR BLD: 39 % (ref 32–75)
NRBC # BLD: 0 K/UL (ref 0–0.01)
NRBC BLD-RTO: 0 PER 100 WBC
P-R INTERVAL, ECG05: 134 MS
PLATELET # BLD AUTO: 276 K/UL (ref 150–400)
PMV BLD AUTO: 9.5 FL (ref 8.9–12.9)
POTASSIUM SERPL-SCNC: 3.8 MMOL/L (ref 3.5–5.1)
PROT SERPL-MCNC: 6.6 G/DL (ref 6.4–8.2)
Q-T INTERVAL, ECG07: 454 MS
QRS DURATION, ECG06: 86 MS
QTC CALCULATION (BEZET), ECG08: 449 MS
RBC # BLD AUTO: 4.48 M/UL (ref 3.8–5.2)
SAMPLES BEING HELD,HOLD: NORMAL
SODIUM SERPL-SCNC: 139 MMOL/L (ref 136–145)
STRESS ANGINA INDEX: 0
STRESS BASELINE DIAS BP: 60 MMHG
STRESS BASELINE HR: 64 BPM
STRESS BASELINE SYS BP: 100 MMHG
STRESS ESTIMATED WORKLOAD: 13.4 METS
STRESS EXERCISE DUR MIN: NORMAL
STRESS PEAK DIAS BP: 100 MMHG
STRESS PEAK SYS BP: 180 MMHG
STRESS PERCENT HR ACHIEVED: 99 %
STRESS POST PEAK HR: 164 BPM
STRESS RATE PRESSURE PRODUCT: NORMAL BPM*MMHG
STRESS ST DEPRESSION: 0 MM
STRESS ST ELEVATION: 0 MM
STRESS TARGET HR: 166 BPM
TROPONIN I SERPL-MCNC: <0.05 NG/ML
TROPONIN I SERPL-MCNC: <0.05 NG/ML
VENTRICULAR RATE, ECG03: 59 BPM
WBC # BLD AUTO: 4.4 K/UL (ref 3.6–11)

## 2019-12-18 PROCEDURE — 85379 FIBRIN DEGRADATION QUANT: CPT

## 2019-12-18 PROCEDURE — 71046 X-RAY EXAM CHEST 2 VIEWS: CPT

## 2019-12-18 PROCEDURE — 74011250636 HC RX REV CODE- 250/636: Performed by: EMERGENCY MEDICINE

## 2019-12-18 PROCEDURE — 93005 ELECTROCARDIOGRAM TRACING: CPT

## 2019-12-18 PROCEDURE — 99285 EMERGENCY DEPT VISIT HI MDM: CPT

## 2019-12-18 PROCEDURE — 36415 COLL VENOUS BLD VENIPUNCTURE: CPT

## 2019-12-18 PROCEDURE — 83880 ASSAY OF NATRIURETIC PEPTIDE: CPT

## 2019-12-18 PROCEDURE — 84484 ASSAY OF TROPONIN QUANT: CPT

## 2019-12-18 PROCEDURE — 80053 COMPREHEN METABOLIC PANEL: CPT

## 2019-12-18 PROCEDURE — 83735 ASSAY OF MAGNESIUM: CPT

## 2019-12-18 PROCEDURE — 93320 DOPPLER ECHO COMPLETE: CPT

## 2019-12-18 PROCEDURE — 85025 COMPLETE CBC W/AUTO DIFF WBC: CPT

## 2019-12-18 PROCEDURE — 74011250637 HC RX REV CODE- 250/637

## 2019-12-18 RX ORDER — ASPIRIN 325 MG
TABLET ORAL
Status: COMPLETED
Start: 2019-12-18 | End: 2019-12-18

## 2019-12-18 RX ORDER — TRETINOIN 0.5 MG/G
CREAM TOPICAL AS NEEDED
COMMUNITY
End: 2020-08-28 | Stop reason: SDUPTHER

## 2019-12-18 RX ORDER — ASPIRIN 325 MG
325 TABLET ORAL
Status: COMPLETED | OUTPATIENT
Start: 2019-12-18 | End: 2019-12-18

## 2019-12-18 RX ADMIN — ASPIRIN 325 MG: 325 TABLET, FILM COATED ORAL at 07:52

## 2019-12-18 RX ADMIN — Medication 325 MG: at 07:52

## 2019-12-18 RX ADMIN — SODIUM CHLORIDE 1000 ML: 900 INJECTION, SOLUTION INTRAVENOUS at 10:17

## 2019-12-18 NOTE — ED TRIAGE NOTES
Pt episode of shortness of breath last night lasting 5 minutes. This AM onset of substernal chest pain described as a \"intense, squeezing\" pain radiated into bilateral jaw. Pt reports pain has subsided at this time, but does have episodes of intermittent pain.  Pt endorsed nausea with pain

## 2019-12-18 NOTE — ED PROVIDER NOTES
47 y.o. female with past medical history significant for Kim Marines syndrome, hypercholesterolemia, esophagitis, and gallbladder attack who presents from home with chief complaint of chest pain. Pt complains of chest pain that radiated into bilateral jaw with associated shortness of breath, dizziness, and nausea that started this morning while she was sleeping. Pt states pain lasted for 10 minutes and then started to subside. She reports the pain as intermittent while she was driving to the hospital. Pt describes the pain as a \"tightness\". Pt states she had an episode of shortness of breath and bilateral jaw pain last night that lasted 5 minutes. She has continued with intermittent pain med night and again this AM on her way to ED. However, now the pain has subsided. Yet, she remains short of breath. Pt denies exertional exacerbation. Pt denies leg swelling, vomiting, back pain, recent stress, or any recent travel. There are no other acute medical concerns at this time. Social hx: Never Smoker. Denies EtOH. former benzodiazepine user  Family Hx: Grandfather and Uncle- MI in 52's. Surgical hx: cholecystectomy, tonsilectomy  PCP: Danyel Ortiz MD    Note written by Sean Wang. Duglas White, as dictated by Zahra Love MD 7:33 AM      The history is provided by the patient. No  was used.         Past Medical History:   Diagnosis Date    Acid indigestion     Esophagitis     Fast heart beat     Gallbladder attack     Gilbert syndrome     Hypercholesterolemia     Stomach pain        Past Surgical History:   Procedure Laterality Date    ABDOMEN SURGERY PROC UNLISTED      HX CHOLECYSTECTOMY  3/2016    HX CHOLECYSTECTOMY Right     HX GYN  2006    c-sec    HX HEENT  1970    tonsilectomy    HX OTHER SURGICAL  3/8/16    single site laparoscopic cholecystectomy         Family History:   Problem Relation Age of Onset    Cancer Father     Ovarian Cancer Maternal Aunt 61 Social History     Socioeconomic History    Marital status:      Spouse name: Not on file    Number of children: Not on file    Years of education: Not on file    Highest education level: Not on file   Occupational History    Not on file   Social Needs    Financial resource strain: Not on file    Food insecurity:     Worry: Not on file     Inability: Not on file    Transportation needs:     Medical: Not on file     Non-medical: Not on file   Tobacco Use    Smoking status: Never Smoker    Smokeless tobacco: Never Used   Substance and Sexual Activity    Alcohol use: No    Drug use: Not Currently     Types: Benzodiazepines    Sexual activity: Yes     Partners: Male     Birth control/protection: Condom   Lifestyle    Physical activity:     Days per week: Not on file     Minutes per session: Not on file    Stress: Not on file   Relationships    Social connections:     Talks on phone: Not on file     Gets together: Not on file     Attends Cheondoism service: Not on file     Active member of club or organization: Not on file     Attends meetings of clubs or organizations: Not on file     Relationship status: Not on file    Intimate partner violence:     Fear of current or ex partner: Not on file     Emotionally abused: Not on file     Physically abused: Not on file     Forced sexual activity: Not on file   Other Topics Concern    Not on file   Social History Narrative    Moved her from [place]    Younger son has autism but was healed of speech problem         ALLERGIES: Amoxicillin and Pcn [penicillins]    Review of Systems   Constitutional: Negative for chills and fever. Eyes: Negative for visual disturbance. Respiratory: Positive for shortness of breath. Negative for cough. Cardiovascular: Positive for chest pain. Negative for palpitations and leg swelling. Gastrointestinal: Positive for nausea. Negative for abdominal pain and vomiting.    Genitourinary: Negative for dysuria and hematuria. Musculoskeletal: Positive for arthralgias (bilateral jaw). Negative for back pain and joint swelling. Skin: Negative for color change and rash. Neurological: Positive for dizziness. Negative for weakness, light-headedness and headaches. All other systems reviewed and are negative. Vitals:    12/18/19 0727   BP: 109/74   Pulse: 66   Resp: 14   Temp: 98.1 °F (36.7 °C)   SpO2: 99%   Weight: 59 kg (130 lb)   Height: 5' 8\" (1.727 m)            Physical Exam  Vitals signs and nursing note reviewed. Constitutional:       Appearance: She is well-developed. HENT:      Head: Normocephalic and atraumatic. Nose: Nose normal.   Eyes:      Conjunctiva/sclera: Conjunctivae normal.      Pupils: Pupils are equal, round, and reactive to light. Neck:      Musculoskeletal: Normal range of motion and neck supple. Cardiovascular:      Rate and Rhythm: Normal rate and regular rhythm. Heart sounds: Normal heart sounds. Pulmonary:      Effort: Pulmonary effort is normal.      Breath sounds: Normal breath sounds. Abdominal:      General: Bowel sounds are normal.      Palpations: Abdomen is soft. Musculoskeletal: Normal range of motion. Skin:     General: Skin is warm and dry. Neurological:      Mental Status: She is oriented to person, place, and time. Deep Tendon Reflexes: Reflexes are normal and symmetric. Psychiatric:         Behavior: Behavior normal.      Note written by Dot Hyde. Darek Dean, as dictated by Soumya Levi MD 7:35 AM      MDM       Procedures    ED EKG interpretation:  Rhythm: sinus bradycardia; and regular . Rate (approx.): 59; Axis: normal; ST/T wave: non-specific T wave abnormality in the inferior leads; Intervals: Normal QRS. Normal P. Slightly prolonged QT interval.  Note written by Kim Dean, as dictated by Soumya Levi MD 7:55 AM      10:33 AM  No symptoms while in the ED.  Patient states her blood pressure is low at baseline, so will not be aggressive with treatment for hypotension. CONSULT NOTE:  10:55 AM Lluvia Amato MD spoke with Dr. Sara Davidson, Consult for Cardiology. Discussed available diagnostic tests and clinical findings. Dr. Torrez Party agrees with plan for stress ECHO. Will call over and schedule. 11:48 AM  Patient agrees to stress ECHO. Stress normal.  Pt advised to follow up with PCP, GI, Cardiology and return precautions given. Patient's results have been reviewed with them. Patient and/or family have verbally conveyed their understanding and agreement of the patient's signs, symptoms, diagnosis, treatment and prognosis and additionally agree to follow up as recommended or return to the Emergency Room should their condition change prior to follow-up. Discharge instructions have also been provided to the patient with some educational information regarding their diagnosis as well a list of reasons why they would want to return to the ER prior to their follow-up appointment should their condition change.     Hayder Nolasco MD

## 2019-12-18 NOTE — PROGRESS NOTES
[facility]: MED RECONCILIATION    Information obtained from: Patient    Allergies: Amoxicillin and Pcn [penicillins]    Prior to Admission Medications:     Medication Documentation Review Audit       Reviewed by SHAHRZAD MaderaD (Pharmacist) on 12/18/19 at       Medication Sig Documenting Provider Last Dose Status Taking?   tretinoin (RETIN-A) 0.05 % topical cream Apply  to affected area as needed.  Provider, Historical 12/11/2019 Active Yes                  ThanksAlfonso, PHARMD   Contact: [phone]

## 2019-12-18 NOTE — DISCHARGE INSTRUCTIONS

## 2020-01-27 ENCOUNTER — OFFICE VISIT (OUTPATIENT)
Dept: FAMILY MEDICINE CLINIC | Age: 55
End: 2020-01-27

## 2020-01-27 VITALS
RESPIRATION RATE: 18 BRPM | SYSTOLIC BLOOD PRESSURE: 96 MMHG | TEMPERATURE: 98.2 F | HEIGHT: 68 IN | OXYGEN SATURATION: 98 % | BODY MASS INDEX: 19.77 KG/M2 | DIASTOLIC BLOOD PRESSURE: 60 MMHG | HEART RATE: 60 BPM

## 2020-01-27 DIAGNOSIS — R07.9 CHEST PAIN, UNSPECIFIED TYPE: Primary | ICD-10-CM

## 2020-01-27 DIAGNOSIS — K21.9 GASTROESOPHAGEAL REFLUX DISEASE, ESOPHAGITIS PRESENCE NOT SPECIFIED: ICD-10-CM

## 2020-01-27 NOTE — PROGRESS NOTES
Subjective:   Armand Blankenship is an 47 y.o. female who presents for follow up on chest pain. HPI    Chief Complaint   Patient presents with   [facility] ED Follow-up     chest pain, per patient states she had a tinged of pain on 1/26/20 when exhale     She was seen at [facility] ER 4 weeks ago for chest pain. Her ER course was personally reviewed. Cardiac troponins were negative x 2, stress ECHO was performed and was negative as well. Cardiology was consulted who recommended to follow up out patinet. She states that since ER visit she had 1 episode of chest pain which lasted couple of seconds and resolved by its own. The was located in the left side of the chest.epigastric area, w/o any aggravating factors, w/o radiation. No chest pain today. She has scheduled appointment with Mane Foley and is asking for referral.   S/p cholecystectomy. She has a long history of esophagitis. She was followed by GI and EGD in the past and supposes to get them every 3 years, next one is due in 2021. She takes only Pepcid as needed and does not want to try PPi as they \"may be harmful\". Allergies - reviewed: Allergies   Allergen Reactions    Amoxicillin Swelling    Pcn [Penicillins] Swelling     Tolerated keflex         Medications - reviewed:  Current Outpatient Medications   Medication Sig    tretinoin (RETIN-A) 0.05 % topical cream Apply  to affected area as needed. No current facility-administered medications for this visit.           Past Medical History - reviewed:  Past Medical History:   Diagnosis Date    Acid indigestion     Esophagitis     Fast heart beat     Gallbladder attack     Gilbert syndrome     Hypercholesterolemia     Stomach pain          Past Surgical History - reviewed:  Past Surgical History:   Procedure Laterality Date    ABDOMEN SURGERY PROC UNLISTED      HX CHOLECYSTECTOMY  3/2016    HX CHOLECYSTECTOMY Right     HX GYN  2006    c-sec    HX HEENT  1970    tonsilectomy    HX OTHER SURGICAL  3/8/16    single site laparoscopic cholecystectomy         Review of Systems   CONSTITUTIONAL: denies fever. Denies chills. CARDIOVASCULAR: +chest pain (not present today). Denies palpitations  RESPIRATORY: denies shortness of breath  GI: denies abdominal pain. Denies change in stools. Denies hematochezia/melana  MUSCULOSKELETAL: denies joint pain. SKIN: denies rash. Denies easy bruising      Objective:     Visit Vitals  BP 96/60 (BP 1 Location: Left arm, BP Patient Position: Sitting)   Pulse 60   Temp 98.2 °F (36.8 °C) (Oral)   Resp 18   Ht 5' 8\" (1.727 m)   LMP 08/06/2009   SpO2 98%   BMI 19.77 kg/m²       General appearance - alert, well appearing, and in no distress  Chest - clear to auscultation, no wheezes, rales or rhonchi, symmetric air entry  Heart - normal rate, regular rhythm, normal S1, S2, no murmurs, rubs, clicks or gallops  Abdomen - soft, nontender, nondistended, no masses or organomegaly      Assessment:   Brittany Joiner is a 47 y.o. female who was seen today for:    ICD-10-CM ICD-9-CM    1. Chest pain, unspecified type R07.9 786.50 REFERRAL TO CARDIOLOGY   2. Gastroesophageal reflux disease, esophagitis presence not specified K21.9 530.81        No chest pain today. Suspect that the symptoms may be related to GERD. Plan:   · Discussed trial of PPIs bu the patient declined. · Follow up with cardiologist as recommended   · Recommended to follow up with GI,the list with the specialists was given. She will call the clinic when appointment is made so we can put the referral.   · ER precautions were given       Follow-up and Dispositions    · Return if symptoms worsen or fail to improve. I have discussed the diagnosis with the patient and the intended plan as seen in the above orders. The patient has received an after-visit summary and questions were answered concerning future plans. I have discussed medication side effects and warnings with the patient as well.  Informed pt to return to the office if new symptoms arise.       Michelle Noble MD  Family Medicine Physician

## 2020-01-27 NOTE — PATIENT INSTRUCTIONS
Margaux Garcia MD 
[facility], [address] [address] Office: [phone] Darrion Ashford MD 
[address] [place] [address] [facility] [address] [place] [address] Phone: [phone] Fax: [phone] Your physician has referred you for a procedure/imaging as discussed. A member of the [facility] Team will contact you within 24 hours to schedule. If you do not hear from a team member, please call [phone] to speak with this team directly. 
---------------------------------------------------------------------------------------------------------------------- Please call Liliana Arambula at our office ([phone]) after you have made the appointment with the specialist.  Your referral for your insurance will not be done until she has the date and time of your specialty appointment!!

## 2020-01-27 NOTE — PROGRESS NOTES
Identified pt with two pt identifiers(name and ). Reviewed record in preparation for visit and have obtained necessary documentation. Chief Complaint   Patient presents with   [facility] Follow-up     chest pain, per patient states she had a tinged of pain on 20 when exhale        Health Maintenance Due   Topic    Shingrix Vaccine Age 50> (1 of 2)    FOBT Q 1 YEAR AGE 54-65     Influenza Age 5 to Adult        Visit Vitals  BP 96/60 (BP 1 Location: Left arm, BP Patient Position: Sitting)   Pulse 60   Temp 98.2 °F (36.8 °C) (Oral)   Resp 18   Ht 5' 8\" (1.727 m)   SpO2 98%   BMI 19.77 kg/m²         Coordination of Care Questionnaire:  :   1) Have you been to an emergency room, urgent care, or hospitalized since your last visit? If yes, where when, and reason for visit? yes [facility] chest pain      2. Have seen or consulted any other health care provider since your last visit? If yes, where when, and reason for visit? NO      3) Do you have an Advanced Directive/ Living Will in place? NO  If yes, do we have a copy on file NO  If no, would you like information NO    Patient is accompanied by self I have received verbal consent from Laura Shrestha to discuss any/all medical information while they are present in the room.

## 2020-01-30 ENCOUNTER — TELEPHONE (OUTPATIENT)
Dept: FAMILY MEDICINE CLINIC | Age: 55
End: 2020-01-30

## 2020-02-11 ENCOUNTER — OFFICE VISIT (OUTPATIENT)
Dept: CARDIOLOGY CLINIC | Age: 55
End: 2020-02-11

## 2020-02-11 VITALS
HEIGHT: 68 IN | OXYGEN SATURATION: 97 % | WEIGHT: 133 LBS | BODY MASS INDEX: 20.16 KG/M2 | DIASTOLIC BLOOD PRESSURE: 82 MMHG | HEART RATE: 76 BPM | SYSTOLIC BLOOD PRESSURE: 100 MMHG

## 2020-02-11 DIAGNOSIS — R06.00 DYSPNEA, UNSPECIFIED TYPE: Primary | ICD-10-CM

## 2020-02-11 DIAGNOSIS — R07.9 CHEST PAIN, UNSPECIFIED TYPE: ICD-10-CM

## 2020-02-11 NOTE — PROGRESS NOTES
Yanick Garcia is a 47 y.o. female    Chief Complaint   Patient presents with    New Patient     chest pain     Chest pain patient states some CP that comes and goes; she references it as a fly away pain. Patient has family hx of heart bypass    SOB Patient states some SOB; patient states it maybe during winter or anxiety     Dizziness No    Swelling No    Refills No    Visit Vitals  /82 (BP 1 Location: Left arm, BP Patient Position: Sitting)   Pulse 76   Ht 5' 8\" (1.727 m)   Wt 133 lb (60.3 kg)   LMP 08/06/2009   SpO2 97%   BMI 20.22 kg/m²       1. Have you been to the ER, urgent care clinic since your last visit? Hospitalized since your last visit? ED 12/18/2019    2. Have you seen or consulted any other health care providers outside of the [address] [place] [address] since your last visit? Include any pap smears or colon screening.   No

## 2020-02-11 NOTE — PROGRESS NOTES
Sandy Roman MD    [address] [facility] [place], [address] [facility]    Office [phone],J [phone]  Pager [phone]    Chantell Jenkins is a 47 y.o. female referred for evaluation of dyspnea. Consult requested by Brianne Church MD    Primary care physician:  Brianne Church MD      Chief complaint:  Chantell Jenkins is a 47 y.o. female who complains of   Chief Complaint   Patient presents with    New Patient     chest pain     Dear Dr Stacie Schroeder,    I had the pleasure of seeing Ms Chantell Jenkins in the office today. Assessment:    Dyspnea  History of atypical chest pain-normal stress echocardiogram 12/2019    Plan:   Symptoms probably not cardiac related. Will do echocardiogram.  If she has significant worsening of her symptoms, would recommend seeing pulmonologist.  However, her symptoms are at rest and not with exertion and could be related to possible anxiety. Aggressive cardiovascular risk factor modification. Follow-up PRN. Patient understands the plan. All questions were answered to the patient's satisfaction. Medication Side Effects and Warnings were discussed with patient: yes  Patient Labs were reviewed and or requested:  yes  Patient Past Records were reviewed and or requested: yes    I appreciate the opportunity to be involved in [address] [place] [address]. Please see note below for details. Please do not hesitate to contact us with questions or concerns. Sandy Roman MD    Cardiac Testing/ Procedures: A. Cardiac Cath/PCI:    B.ECHO/KAREN:    C.StressNuclear/Stress ECHO/Stress test: 12/18/2000 19-10 minutes 30 seconds/normal    D. Vascular:    E. EP:    F. Miscellaneous:    History of present illness:    Patient is a pleasant 24-year-old  female who was in the ED on 12/18/2019 for chest pain. Stress echocardiogram was done which was normal.  Chest pain is resolved.   Occasionally, she is feels that she is not getting enough air and has to take a deep breath. However she is very active and does ballet/yoga/Pilates and has no problems with exertional activity. No swelling lower extremities. Moved from [place] to here. Was a dental hygienist previously. Believes in natural/herbal remedies. Past Medical History:   Diagnosis Date    Acid indigestion     Esophagitis     Fast heart beat     Gallbladder attack     Gilbert syndrome     Hypercholesterolemia     Stomach pain       Past Surgical History:   Procedure Laterality Date    ABDOMEN SURGERY PROC UNLISTED      HX CHOLECYSTECTOMY  3/2016    HX CHOLECYSTECTOMY Right     HX GYN  2006    c-sec    HX HEENT  1970    tonsilectomy    HX OTHER SURGICAL  3/8/16    single site laparoscopic cholecystectomy     Family History   Problem Relation Age of Onset    Cancer Father     Ovarian Cancer Maternal Aunt 61      Social History     Tobacco Use    Smoking status: Never Smoker    Smokeless tobacco: Never Used   Substance Use Topics    Alcohol use: No    Drug use: Not Currently     Types: Benzodiazepines          Medications before admission:    Current Outpatient Medications   Medication Sig Dispense    tretinoin (RETIN-A) 0.05 % topical cream Apply  to affected area as needed. No current facility-administered medications for this visit.             Review of Systems:  (bold if positive, if negative)    Gen:  Eyes:  ENT:  CVS:  Pulm:  GI:  GERDGU:  MS:  Skin:  Psych:  Endo:  Hem:  Renal:  Neuro:      Physical Exam:  Visit Vitals  /82 (BP 1 Location: Left arm, BP Patient Position: Sitting)   Pulse 76   Ht 5' 8\" (1.727 m)   Wt 133 lb (60.3 kg)   LMP 08/06/2009   SpO2 97%   BMI 20.22 kg/m²          Gen: Well-developed, well-nourished, in no acute distress  HEENT:  Pink conjunctivae, hearing intact to voice, moist mucous membranes  Neck: Supple,No JVD, No Carotid Bruit, Thyroid- non tender  Resp: No accessory muscle use, Clear breath sounds, No rales or rhonchi  Card: Regular Rate,Rythm,Normal S1, S2, No murmurs, rubs or gallop. No thrills. Abd:  Soft, non-tender, non-distended, normoactive bowel sounds are present,   MSK: No cyanosis or clubbing  Skin: No rashes or ulcers  Neuro:  moving all four extremities, no focal deficit, follows commands appropriately  Psych:  Good insight, oriented to person, place and time, alert, Nml Affect  LE: No edema  Vascular: Radial Pulses 2+ and symmetric        EKG:      Cxray:    LABS:    No results for input(s): CPK, TROIQ in the last 72 hours. No lab exists for component: CKQMB, CPKMB    Lab Results   Component Value Date/Time    WBC 4.4 12/18/2019 07:38 AM    WBC 3.3 (L) 06/08/2012 08:56 AM    HGB 13.8 12/18/2019 07:38 AM    HCT 41.3 12/18/2019 07:38 AM    PLATELET 080 66/12/3791 07:38 AM    MCV 92.2 12/18/2019 07:38 AM     Lab Results   Component Value Date/Time    Sodium 139 12/18/2019 07:38 AM    Potassium 3.8 12/18/2019 07:38 AM    Chloride 108 12/18/2019 07:38 AM    CO2 28 12/18/2019 07:38 AM    Anion gap 3 (L) 12/18/2019 07:38 AM    Glucose 100 12/18/2019 07:38 AM    BUN 26 (H) 12/18/2019 07:38 AM    Creatinine 0.66 12/18/2019 07:38 AM    BUN/Creatinine ratio 39 (H) 12/18/2019 07:38 AM    GFR est AA >60 12/18/2019 07:38 AM    GFR est non-AA >60 12/18/2019 07:38 AM    Calcium 8.3 (L) 12/18/2019 07:38 AM       ATTENTION:   This medical record was transcribed using an electronic medical records/speech recognition system. Although proofread, it may and can contain electronic, spelling and other errors. Corrections may be executed at a later time. Please feel free to contact us for any clarifications as needed.         Orin Valencia MD

## 2020-02-13 ENCOUNTER — OFFICE VISIT (OUTPATIENT)
Dept: FAMILY MEDICINE CLINIC | Age: 55
End: 2020-02-13

## 2020-02-13 ENCOUNTER — TELEPHONE (OUTPATIENT)
Dept: FAMILY MEDICINE CLINIC | Age: 55
End: 2020-02-13

## 2020-02-13 VITALS
TEMPERATURE: 98 F | OXYGEN SATURATION: 98 % | HEART RATE: 66 BPM | DIASTOLIC BLOOD PRESSURE: 57 MMHG | WEIGHT: 133 LBS | HEIGHT: 68 IN | SYSTOLIC BLOOD PRESSURE: 99 MMHG | RESPIRATION RATE: 18 BRPM | BODY MASS INDEX: 20.16 KG/M2

## 2020-02-13 DIAGNOSIS — H04.123 DRY EYE SYNDROME OF BOTH EYES: ICD-10-CM

## 2020-02-13 DIAGNOSIS — H57.89 REDNESS OF EYE, LEFT: ICD-10-CM

## 2020-02-13 DIAGNOSIS — H11.32 CONJUNCTIVAL HEMORRHAGE OF LEFT EYE: Primary | ICD-10-CM

## 2020-02-13 LAB
POC BOTH EYES RESULT, BOTHEYE: NORMAL
POC LEFT EYE RESULT, LFTEYE: NORMAL
POC RIGHT EYE RESULT, RGTEYE: NORMAL

## 2020-02-13 NOTE — PROGRESS NOTES
HPI     Chief Complaint   Patient presents with    Pink Eye     possible pink eye ( left ) x 2 days     She is a 47 y.o. female who presents for eye problem. 2 days ago was in shower and rubbed shampoo in her eye. States it has been red on the lateral aspect of L eye since. Normally has dry eye at baseline uses Systeine drops. No visual acuity issues. No itching or discharge or FB sensation. Just wears glasses for driving, no contact. Review of Systems   Constitutional: Negative for chills and fever. Eyes: Positive for redness. Negative for photophobia, pain, discharge, itching and visual disturbance. Reviewed PmHx, RxHx, FmHx, SocHx, AllgHx and updated and dated in the chart. Physical Exam:  Visit Vitals  BP 99/57 (BP 1 Location: Right arm, BP Patient Position: Sitting)   Pulse 66   Temp 98 °F (36.7 °C) (Oral)   Resp 18   Ht 5' 8\" (1.727 m)   Wt 133 lb (60.3 kg)   LMP 08/06/2009   SpO2 98%   BMI 20.22 kg/m²     Physical Exam  Vitals signs and nursing note reviewed. Constitutional:       General: She is not in acute distress. Appearance: Normal appearance. She is normal weight. She is not ill-appearing or toxic-appearing. HENT:      Head: Atraumatic. Eyes:      Comments: L conjunctival hemorrhage. No exudate or matting appreciated. SAYDA BL. EOM intact. Neurological:      General: No focal deficit present. Mental Status: She is alert.    Psychiatric:         Mood and Affect: Mood normal.         Behavior: Behavior normal.           Recent Results (from the past 12 hour(s))   AMB POC VISUAL ACUITY SCREEN    Collection Time: 02/13/20  8:07 AM   Result Value Ref Range    Left eye 20/40     Right eye 20/40     Both eyes 20/30         Assessment / Plan     L Conjunctival Hemorrhage   - Visual acuity WNL for patient  - Given reassurance and RTC precautions including worsening pain, change in vision, or difficulty moving eye  - Given referral to Optho for her dry eye    I have discussed the diagnosis with the patient and the intended plan as seen in the above orders. The patient has received an after-visit summary and questions were answered concerning future plans. I have discussed medication side effects and warnings with the patient as well.     Patient discussed with Dr. Ewa Peace (Attending)    Rebecca Tam DO  Family Medicine Resident

## 2020-02-13 NOTE — PROGRESS NOTES
Chief Complaint   Patient presents with    Pink Eye     possible pink eye ( left ) x 2 days     1. Have you been to the ER, urgent care clinic since your last visit? Hospitalized since your last visit? No    2. Have you seen or consulted any other health care providers outside of the [address] [place] [address] since your last visit? Include any pap smears or colon screening. No     Reviewed record in preparation for visit and have obtained necessary documentation.

## 2020-02-13 NOTE — PATIENT INSTRUCTIONS
Subconjunctival Hemorrhage: Care Instructions  Your Care Instructions    Sometimes small blood vessels in the white of the eye can break, causing a red spot or speck. This is called a subconjunctival hemorrhage. The blood vessels may break when you sneeze, cough, vomit, strain, or bend over. Sometimes there is no clear cause. The blood may look alarming, especially if the spot is large. If there is no pain or vision change, there is usually no reason to worry, and the blood slowly will go away on its own in 2 to 3 weeks. Follow-up care is a key part of your treatment and safety. Be sure to make and go to all appointments, and call your doctor if you are having problems. It's also a good idea to know your test results and keep a list of the medicines you take. How can you care for yourself at home? · Watch for changes in your eye. It is normal for the red spot on your eyeball to change color as it heals. Just like a bruise on your skin, it may change from red to brown to purple to yellow. · Do not take aspirin or products that contain aspirin, which can increase bleeding. Use acetaminophen (Tylenol) if you need pain relief for another problem. · Do not take two or more pain medicines at the same time unless the doctor told you to. Many pain medicines have acetaminophen, which is Tylenol. Too much acetaminophen (Tylenol) can be harmful. When should you call for help? Call your doctor now or seek immediate medical care if:    · You have signs of an eye infection, such as:  ? Pus or thick discharge coming from the eye.  ? Redness or swelling around the eye.  ? A fever.     · You see blood over the black part of your eye (pupil).     · You have any changes or problems in your vision.     · You have any pain in your eye.    Watch closely for changes in your health, and be sure to contact your doctor if:    · You do not get better as expected. Where can you learn more?   Go to http://rubne-brayden.info/. Enter E291 in the search box to learn more about \"Subconjunctival Hemorrhage: Care Instructions. \"  Current as of: May 5, 2019  Content Version: 12.2  © 4296-8306 [employer]. Care instructions adapted under license by [employer] (which disclaims liability or warranty for this information).  If you have questions about a medical condition or this instruction, always ask your healthcare professional. [address] any warranty or liability for your use of this information.

## 2020-02-13 NOTE — TELEPHONE ENCOUNTER
[phone]    Patient called and said was seen this morning in which she was referred to   Dr. Shirley Lewis he had his phone number information but the doctor did not give her the order. She was informed to call there, schedule an appointment, and call the office back with the information.

## 2020-02-21 ENCOUNTER — TELEPHONE (OUTPATIENT)
Dept: CARDIOLOGY CLINIC | Age: 55
End: 2020-02-21

## 2020-03-03 ENCOUNTER — PATIENT MESSAGE (OUTPATIENT)
Dept: FAMILY MEDICINE CLINIC | Age: 55
End: 2020-03-03

## 2020-04-16 ENCOUNTER — TELEPHONE (OUTPATIENT)
Dept: FAMILY MEDICINE CLINIC | Age: 55
End: 2020-04-16

## 2020-04-16 NOTE — TELEPHONE ENCOUNTER
----- Message from Rhonda Lind sent at 4/16/2020  2:40 PM EDT -----  Regarding: Dr. Stevens Cap: [phone]  Patient is requesting a call back in reference to her health.

## 2020-04-17 NOTE — TELEPHONE ENCOUNTER
I spoke with patient and she has been complaining of severe abdominal pain for a couple days now. She isnt sure if it something that she ate that triggered it but would like to be evaluated via telemed to discuss symptoms further. I scheduled a telemed appt for patient.

## 2020-04-22 ENCOUNTER — E-VISIT (OUTPATIENT)
Dept: FAMILY MEDICINE CLINIC | Age: 55
End: 2020-04-22

## 2020-04-22 ENCOUNTER — VIRTUAL VISIT (OUTPATIENT)
Dept: FAMILY MEDICINE CLINIC | Age: 55
End: 2020-04-22

## 2020-04-22 DIAGNOSIS — R10.10 PAIN OF UPPER ABDOMEN: Primary | ICD-10-CM

## 2020-04-22 DIAGNOSIS — K43.2 HERNIATION THROUGH SURGICAL SITE: ICD-10-CM

## 2020-04-22 NOTE — PROGRESS NOTES
Colletta Lobos  47 y.o. female  1965  [address] [address]  [id number]   [address]:    Telemedicine Progress Note  Beny Garcia MD       Encounter Date and Time: 2020 at 1:10 PM    Consent:  She and/or the health care decision maker is aware that that she may receive a bill for this telephone service, depending on her insurance coverage, and has provided verbal consent to proceed: Yes    No chief complaint on file. History of Present Illness   Colletta Lobos is a 47 y.o. female was evaluated by synchronous (real-time) audio-video technology from home, through the [facility] Portal.    Concerns for abdominal pain. States that she ate dinner (2 hot dogs and peanut butter shake) and developed severe, \"intense\" intermittent spasm-like abdominal pain 20-30 minutes after her meal.  She states she could \"could feel food moving through intestines. \"  Symptoms lasted for approximately 45 minutes to 1 hour. States she felt a small nodule on left side of abdomen palpable in superficial abdominal wall. She is concerned the \"food got stuck in the neck of her hernia. \" States she has had a galbladder surgery and a concurrent umbilical hernia repair. Does feel a bulge around her hernia, that is now about the size of an almond. She had similar sx before when crunched or after putting on her socks. This wwould be short term and improve with srertching. Does note associated chills, but denies fever, cough, sneezing. May have been somewhat constipated. Fhx: Dad  of stomach cancer. Review of Systems   Review of Systems   Constitutional: Positive for chills. Respiratory: Negative for cough and shortness of breath. Gastrointestinal: Positive for abdominal pain and constipation (?). Negative for blood in stool, diarrhea, melena, nausea and vomiting. Genitourinary: Negative.         Vitals/Objective:     General: alert, cooperative, no distress   Mental  status: mental status: alert, oriented to person, place, and time, normal mood, behavior, speech, dress, motor activity, and thought processes   Resp: resp: normal effort and no respiratory distress   ABD: Patient shows ~1x1.5cm reproducible mass superior to umbilicus. No erythema noed. Neuro: neuro: no gross deficits   Skin: skin: no discoloration or lesions of concern on visible areas   Due to this being a TeleHealth evaluation, many elements of the physical examination are unable to be assessed. Assessment and Plan:   Time-based coding, delete if not needed: I spent at least 25 minutes with this established patient, and >50% of the time was spent counseling and/or coordinating care regarding abdominal pain    1. Pain of upper abdomen  DDx includes gas pains, adhesions abdominal wall cramping. Pain located superior to umbilical hernia. No longer painful. No red flags. Watchful waiting. If severe pain returns, would go to ED for further evaluation. 2. Herniation through surgical site  Reducible and non tender: Advised to monitor clinically. May consider referral to surgery and imaging if continues to get larger. Discussed warning signs that would prompt urgent/emergent evaluation.   - REFERRAL TO GENERAL SURGERY      We discussed the expected course, resolution and complications of the diagnosis(es) in detail. Medication risks, benefits, costs, interactions, and alternatives were discussed as indicated. I advised her to contact the office if her condition worsens, changes or fails to improve as anticipated. She expressed understanding with the diagnosis(es) and plan. Patient understands that this encounter was a temporary measure, and the importance of further follow up and examination was emphasized. Patient verbalized understanding. Patient informed to follow up: PRN.     Electronically Signed: Priscilla Zavaleta MD    Providers location when delivering service (clinic, hospital, home): Clinic    CPT Codes 49708-92197 for Established Patients may apply to this Telehealth Visit. POS code: 18. Modifier GT      Pursuant to the emergency declaration under the [address] [place] [address] waiver authority and the [employer] and [employer] Act, this Virtual  Visit was conducted, with patient's consent, to reduce the patient's risk of exposure to COVID-19 and provide continuity of care for an established patient. Services were provided through a video synchronous discussion virtually to substitute for in-person clinic visit. History   Patients past medical, surgical and family histories were reviewed and updated.       Past Medical History:   Diagnosis Date    Acid indigestion     Esophagitis     Fast heart beat     Gallbladder attack     Gilbert syndrome     Hypercholesterolemia     Stomach pain      Past Surgical History:   Procedure Laterality Date    ABDOMEN SURGERY PROC UNLISTED      HX CHOLECYSTECTOMY  3/2016    HX CHOLECYSTECTOMY Right     HX GYN  2006    c-sec    HX HEENT  1970    tonsilectomy    HX OTHER SURGICAL  3/8/16    single site laparoscopic cholecystectomy     Family History   Problem Relation Age of Onset    Cancer Father     Ovarian Cancer Maternal Aunt 61     Social History     Socioeconomic History    Marital status:      Spouse name: Not on file    Number of children: Not on file    Years of education: Not on file    Highest education level: Not on file   Occupational History    Not on file   Social Needs    Financial resource strain: Not on file    Food insecurity     Worry: Not on file     Inability: Not on file    Transportation needs     Medical: Not on file     Non-medical: Not on file   Tobacco Use    Smoking status: Never Smoker    Smokeless tobacco: Never Used   Substance and Sexual Activity    Alcohol use: No    Drug use: Not Currently     Types: Benzodiazepines    Sexual activity: Yes     Partners: Male     Birth control/protection: Condom   Lifestyle    Physical activity     Days per week: Not on file     Minutes per session: Not on file    Stress: Not on file   Relationships    Social connections     Talks on phone: Not on file     Gets together: Not on file     Attends Cheondoism service: Not on file     Active member of club or organization: Not on file     Attends meetings of clubs or organizations: Not on file     Relationship status: Not on file    Intimate partner violence     Fear of current or ex partner: Not on file     Emotionally abused: Not on file     Physically abused: Not on file     Forced sexual activity: Not on file   Other Topics Concern    Not on file   Social History Narrative    Moved her from [place]    Younger son has autism but was healed of speech problem     Patient Active Problem List   Diagnosis Code    GERD (gastroesophageal reflux disease) K21.9    S/P colonoscopy Z98.890    Allergic rhinitis J30.9    Unspecified vitamin D deficiency E55.9    Menopausal state N95.1    Dry eye syndrome H04.129    Hiatal hernia K44.9    S/P cholecystectomy Z90.49    Lipid disorder E78.9          Current Medications/Allergies   Medications and Allergies reviewed:    Current Outpatient Medications   Medication Sig Dispense Refill    tretinoin (RETIN-A) 0.05 % topical cream Apply  to affected area as needed.        Allergies   Allergen Reactions    Amoxicillin Swelling    Pcn [Penicillins] Swelling     Tolerated keflex

## 2020-08-21 ENCOUNTER — PATIENT MESSAGE (OUTPATIENT)
Dept: FAMILY MEDICINE CLINIC | Age: 55
End: 2020-08-21

## 2020-08-21 DIAGNOSIS — L98.9 FACIAL LESION: Primary | ICD-10-CM

## 2020-08-28 RX ORDER — TRETINOIN 0.5 MG/G
CREAM TOPICAL AS NEEDED
Qty: 20 G | Refills: 1 | Status: SHIPPED | OUTPATIENT
Start: 2020-08-28 | End: 2020-09-18 | Stop reason: SDUPTHER

## 2020-08-28 NOTE — TELEPHONE ENCOUNTER
From: Vinicius Hill  To: Edis Perez MD  Sent: 8/21/2020 4:46 PM EDT  Subject: [facility] Dr. Landry Homans, I hope you're doing well and staying safe. I am out of my tretinoin 0.05 % topical cream and need a refill. I think you've refilled it the last time. .. Dr. Esparza use to. .. [facility] is where I had it refilled last time but would it be possible to [facility], if not [facility] is fine. Thank you!  Katya Salmon

## 2020-08-31 DIAGNOSIS — L98.9 FACIAL LESION: ICD-10-CM

## 2020-09-08 RX ORDER — TRETINOIN 0.5 MG/G
CREAM TOPICAL AS NEEDED
Qty: 20 G | Refills: 1 | OUTPATIENT
Start: 2020-09-08

## 2020-09-09 ENCOUNTER — PATIENT MESSAGE (OUTPATIENT)
Dept: FAMILY MEDICINE CLINIC | Age: 55
End: 2020-09-09

## 2020-09-09 DIAGNOSIS — L98.9 FACIAL LESION: ICD-10-CM

## 2020-09-18 RX ORDER — TRETINOIN 0.5 MG/G
CREAM TOPICAL AS NEEDED
Qty: 45 G | Refills: 3 | Status: SHIPPED | OUTPATIENT
Start: 2020-09-18 | End: 2021-08-11 | Stop reason: SDUPTHER

## 2020-09-18 NOTE — TELEPHONE ENCOUNTER
From: Fransico Jensen  To: Norman Aviles MD  Sent: 9/9/2020 6:40 PM EDT  Subject: Argentina Simpson, I received my prescription from [facility] but not what I usually get. .. This was only 1 small tube of 20 grams but I usually get the 45 grams and I believe it's 3 or 4 tubes which lasts me the whole year. It's nice because I don't have to keep requesting refills. Would it be possible to refill as previously. BTW, I found an extra tube so I'm good right now. On another note I have something on the bottom of my foot that hurts when I put pressure/walk but if wearing shoes it's much better. I took a pic and will send it this evening. I hope you can enlarge the image. Let me know if I have to come in or be referred.    Thank you,  Shari Ku

## 2021-02-24 ENCOUNTER — PATIENT MESSAGE (OUTPATIENT)
Dept: FAMILY MEDICINE CLINIC | Age: 56
End: 2021-02-24

## 2021-07-19 ENCOUNTER — TELEPHONE (OUTPATIENT)
Dept: FAMILY MEDICINE CLINIC | Age: 56
End: 2021-07-19

## 2021-07-19 NOTE — TELEPHONE ENCOUNTER
----- Message from [facility] sent at 7/19/2021 11:56 AM EDT -----  Regarding: Dr. Ray Nyhan Telephone  Appointment not available    Caller's first and last name and relationship to patient (if not the patient): Pt      Best contact number: [phone]      Preferred date and time: Morning of the 21st-23rd      Scheduled appointment date and time: N/A      Reason for appointment: Sore throat, white patches on back of throat      Details to clarify the request: No VV found for Dr. Ricardo Brady and that is pt's preferred provider. Pt would like to come in office.       [facility]

## 2021-07-20 NOTE — TELEPHONE ENCOUNTER
[employer] message not read yet. Called patient and left a detailed message to either go to urgent care or take a vv appt.

## 2021-08-11 ENCOUNTER — TELEPHONE (OUTPATIENT)
Dept: FAMILY MEDICINE CLINIC | Age: 56
End: 2021-08-11

## 2021-08-11 ENCOUNTER — VIRTUAL VISIT (OUTPATIENT)
Dept: FAMILY MEDICINE CLINIC | Age: 56
End: 2021-08-11

## 2021-08-11 DIAGNOSIS — J39.2 THROAT IRRITATION: Primary | ICD-10-CM

## 2021-08-11 DIAGNOSIS — L98.9 FACIAL LESION: ICD-10-CM

## 2021-08-11 PROCEDURE — 99212 OFFICE O/P EST SF 10 MIN: CPT | Performed by: FAMILY MEDICINE

## 2021-08-11 RX ORDER — TRETINOIN 0.5 MG/G
CREAM TOPICAL AS NEEDED
Qty: 45 G | Refills: 3 | Status: SHIPPED | OUTPATIENT
Start: 2021-08-11 | End: 2022-01-18 | Stop reason: SDUPTHER

## 2021-08-11 NOTE — PROGRESS NOTES
Called the patient per Dr. Santiago Ortiz request to inform he is running a little behind but he is still going to call her for her Virtual appointment this afternoon.

## 2021-08-11 NOTE — PROGRESS NOTES
Jesús oDver  54 y.o. female  1965  [address] [address]  [id number]   [address]:    Telemedicine Progress Note  Natalie Wood MD       Encounter Date and Time: August 11, 2021 at 5:35 PM    Consent: Jesús Dover, who was seen by synchronous (real-time) audio-video technology, and/or her healthcare decision maker, is aware that this patient-initiated, Telehealth encounter on 8/11/2021 is a billable service, with coverage as determined by her insurance carrier. She is aware that she may receive a bill and has provided verbal consent to proceed: Yes. Chief Complaint   Patient presents with    Sore Throat     History of Present Illness   Jesús Dover is a 54 y.o. female was evaluated by synchronous (real-time) audio-video technology from home, through a secure patient portal.  Patient with concerns for throat irritation, scratchiness. Feels as though on the left side of her posterior pharynx may be slightly edematous. As noted no significant changes in voice or difficulty with swallowing. Patient also notes concern of dental complications. Has history of open bite which she feels was not adequately corrected with invisible line (may have remained increased to long per patient). Patient now notes that she has eversion in the bone surrounding her teeth. Currently working with the dentist regarding these issues. She is concerned that they may never be resolved. Review of Systems   Review of Systems   HENT: Positive for sore throat. Respiratory: Negative. Vitals/Objective:     General: alert, cooperative, no distress   Mental  status: mental status: alert, oriented to person, place, and time   Resp: resp: normal effort and no respiratory distress   HEENT Attempted to visualize posterior pharynx. Possible edema on left side, however unable to fully assess. No exudate noted.   No peritonsillar abscess noted   Psych  anxious Due to this being a TeleHealth evaluation, many elements of the physical examination are unable to be assessed. Assessment and Plan:   1. Throat irritation  Given duration of the patient's symptoms and patient's additional concerns for jaw pathology, will refer to ENT for further evaluation and recommendations.  - REFERRAL TO ENT-OTOLARYNGOLOGY    2. Facial lesion  Refilled Retin-A prescription today  - tretinoin (RETIN-A) 0.05 % topical cream; Apply  to affected area as needed (face lesion). Dispense: 45 g; Refill: 3    We discussed the expected course, resolution and complications of the diagnosis(es) in detail. Medication risks, benefits, costs, interactions, and alternatives were discussed as indicated. I advised her to contact the office if her condition worsens, changes or fails to improve as anticipated. She expressed understanding with the diagnosis(es) and plan. Patient understands that this encounter was a temporary measure, and the importance of further follow up and examination was emphasized. Patient verbalized understanding. Follow-up and Dispositions  ·   Return if symptoms worsen or fail to improve. Electronically Signed: aMrgaux Woodward MD    CPT Codes 87465-75200 for Established Patients may apply to this Telehealth Visit. POS code: 18. Modifier GT    Mihir Davey is a 54 y.o. female who was evaluated by an audio-video encounter for concerns as above. Patient identification was verified prior to start of the visit. A caregiver was present when appropriate. Due to this being a TeleHealth encounter (During THPOY-36 public health emergency), evaluation of the following organ systems was limited: Vitals/Constitutional/EENT/Resp/CV/GI//MS/Neuro/Skin/Heme-Lymph-Imm.   Pursuant to the emergency declaration under the [address] [place] [address], [address] [place] [address] authority and the [employer] Act, this Virtual Visit was conducted, with patient's (and/or legal guardian's) consent, to reduce the patient's risk of exposure to COVID-19 and provide necessary medical care. Services were provided through a synchronous discussion virtually to substitute for in-person clinic visit. I was in the office. The patient was at home. History   Patients past medical, surgical and family histories were reviewed and updated. Past Medical History:   Diagnosis Date    Acid indigestion     Esophagitis     Fast heart beat     Gallbladder attack     Gilbert syndrome     Hypercholesterolemia     Stomach pain      Past Surgical History:   Procedure Laterality Date    HX CHOLECYSTECTOMY  3/2016    HX CHOLECYSTECTOMY Right     HX GYN  2006    c-sec    HX HEENT  1970    tonsilectomy    HX OTHER SURGICAL  3/8/16    single site laparoscopic cholecystectomy    [place] ABDOMEN SURGERY PROC UNLISTED       Family History   Problem Relation Age of Onset    Cancer Father     Ovarian Cancer Maternal Aunt 61     Social History     Tobacco Use    Smoking status: Never Smoker    Smokeless tobacco: Never Used   Substance Use Topics    Alcohol use: No    Drug use: Not Currently     Types: Benzodiazepines     Patient Active Problem List   Diagnosis Code    GERD (gastroesophageal reflux disease) K21.9    S/P colonoscopy Z98.890    Allergic rhinitis J30.9    Unspecified vitamin D deficiency E55.9    Menopausal state N95.1    Dry eye syndrome H04.129    Hiatal hernia K44.9    S/P cholecystectomy Z90.49    Lipid disorder E78.9          Current Medications/Allergies   Medications and Allergies reviewed:    Current Outpatient Medications   Medication Sig Dispense Refill    tretinoin (RETIN-A) 0.05 % topical cream Apply  to affected area as needed (face lesion).  45 g 3     Allergies   Allergen Reactions    Amoxicillin Swelling    Pcn [Penicillins] Swelling     Tolerated keflex

## 2021-08-11 NOTE — TELEPHONE ENCOUNTER
Pt was aware that Dr. Oleg Jansen was running behind. Spoke with Dr. Oleg Jansen in he will do a VV around 4:30 pm with pt. Pt is satisfied with this.  ----- Message from Megan Dias sent at 8/11/2021  1:49 PM EDT -----  Regarding: /Telephone  General Message/Vendor Calls    Caller's first and last name:self      Reason for call: Pt advised has not received link for VV visit at 1:40. Callback required yes/no and why:yes, to clarify       Best contact number(s):[phone]      Details to clarify the request:Pt advised hasn't received a link via e-mail or mycujoo and is wanting to be sure she hasn't missed her appointment.        Megan Dias

## 2021-08-13 ENCOUNTER — TELEPHONE (OUTPATIENT)
Dept: FAMILY MEDICINE CLINIC | Age: 56
End: 2021-08-13

## 2021-08-13 NOTE — TELEPHONE ENCOUNTER
Pt has appt 9/7/21 with ENT/Dr. Jeannette Muñoz    Fax [phone]      Patient left a message to her my-chart of this information also.

## 2021-08-25 NOTE — TELEPHONE ENCOUNTER
[phone]    Patient called on this referral request as she called Dr. Yung Murcia office today and they have not received it.

## 2021-09-02 NOTE — TELEPHONE ENCOUNTER
Spoke with [facility] and Dr. Kayley Cloud after speaking with patient's son. Death Certificate will be signed and completed today by Dr. Kayley Cloud.

## 2021-09-07 NOTE — TELEPHONE ENCOUNTER
Catrachita Marcial MD/telephone----9/7  Received: Today  Clovis Duncan, April M  P [facility] Front Office  General Message/Vendor Calls     Caller's first and last name: Esther Monroy from Dr. Willis Marcial office       Reason for call: Requested a referral for the patient       Callback required yes/no and why: Yes       Best contact number(s): [phone]       Details to clarify the request: Esther Monroy stated patient has an appointment today and has been trying to reach the office and hasn't been successful. The patient needs her referral to see Dr. Elvia Lares it could be  faxed to  [phone] to Attention to Esther Monroy.        April [address]

## 2021-09-07 NOTE — TELEPHONE ENCOUNTER
Dr. Lakshmi Andrade  Received:  Today  [facility]  Referral     Caller (first and last name if not the patient or from practice): Pt       Caller's relationship to patient (if not from a practice): Pt       Name of caller (if calling from a practice): na       Name of practice: Dr. Ling Tolentino       Specialist's title, first, and last name: Dr. Albert Ball)       Office Phone Number: [phone]       Fax number: [phone]       Date and time of appointment: 9/7/21 2:45 pm       Reason for appointment: ulcers at the back of her throat       Details to clarify the request: warm transfer unsuccessful/pt states that she has been working on this for weeks and appt is today 09/07/2021       [employer]

## 2021-09-07 NOTE — TELEPHONE ENCOUNTER
Pt calling back about referral status. Pt states she's at appt now. Eliz TRACY/Supervisor took call.

## 2021-11-08 ENCOUNTER — TELEPHONE (OUTPATIENT)
Dept: FAMILY MEDICINE CLINIC | Age: 56
End: 2021-11-08

## 2021-11-08 ENCOUNTER — DOCUMENTATION ONLY (OUTPATIENT)
Dept: FAMILY MEDICINE CLINIC | Age: 56
End: 2021-11-08

## 2021-11-08 RX ORDER — HYDROCORTISONE SODIUM SUCCINATE 100 MG/2ML
100 INJECTION, POWDER, FOR SOLUTION INTRAMUSCULAR; INTRAVENOUS
Status: DISCONTINUED | OUTPATIENT
Start: 2021-11-10 | End: 2021-11-11 | Stop reason: HOSPADM

## 2021-11-08 RX ORDER — DIPHENHYDRAMINE HYDROCHLORIDE 50 MG/ML
50 INJECTION, SOLUTION INTRAMUSCULAR; INTRAVENOUS
Status: ACTIVE | OUTPATIENT
Start: 2021-11-10 | End: 2021-11-10

## 2021-11-08 RX ORDER — ACETAMINOPHEN 325 MG/1
650 TABLET ORAL
Status: ACTIVE | OUTPATIENT
Start: 2021-11-10 | End: 2021-11-10

## 2021-11-08 NOTE — TELEPHONE ENCOUNTER
Patient called about this my chart message as saying it is urgent as it has to be done in some many days. [phone]      Conversation: Positive for Covid-19 11-!!!  (Oldest [facility])    Maury Sandhoff  to Dayan Mansfield MD          11/7/21 3:44 PM  Chuck Bo,   Would you Please give a referral for the \"Monoclonal Antibodies\" as this has to be with in a certain time frame to receive and as you know can be critical to recovery! Kwame and I both have chest issues now. .. I talked with one of the nurses in emergency from [facility] and we have to have a \"referral\" from our Primary Care doctor in order to have it set up. There are probably other sites that maybe closer and that would be fine. Please review [facility] records on me and Marleni Camilo would be with Sly Bo,  Well, I went to [facility] and had a positive test result for Covid and Dr. Sisi Grigsby said I had a bit of pneumonia right lung. My symptoms started on Tuesday 11-. as a cold, scratchy throat, and  progressed to chest along with no smell, taste, very tired and no appetite and last night fever and a weird headache. Marleni Camilo as well was diagnosed last night 11-6-2021 but he went to [facility], his pneumonia was spotty and they prescribed nothing. Today at [facility] Dr. Sisi Grigsby prescribed Moxifloxacin Hydrochloric as she thought this might be helpful as Covid can contribute to bacterial infection, now I might have misunderstood but either way she wanted me to take the antibiotics and an inhaler. Marikay December is doing fine his fever broke after 3 days but has a bit of a cough as we all do but I will get him tested officially. Thank you!!   Fozia Nassar

## 2021-11-08 NOTE — TELEPHONE ENCOUNTER
Practice Supervisor Telephone Encounter    Spoke with patient after receiving several close-in-time phone messages and a phone call from [facility]. Patient frustrated that their orders to receive monoclonal antibody treatments for their covid diagnoses were not signed right away - they've continued to call our clinic and the infusion center constantly, and have complained to case management. She has also sent several [employer] messages to various providers at various practices within the span of just a few minutes asking for help in getting the orders signed. First initial message request received only a few hours ago. I kindly requested to her that she please be patient, that I apologized for the delay that Dr. Melony Cueva is out on vacation this week so I needed to find another doctor to sign their orders while also seeing patients. She stated that [facility] because \"they did not want to wait to have this resolved\". I explained to her that we did not have a documented Covid-19 diagnosis on file for her, and that in order for the infusion center to accept the signed order, I was going to have to include a copy of her covid-positive lab result. She explained that she had a copy of it at home, but did not have access to a fax machine, but that she would ask [facility] to fax a copy over to us. I provided her with fax# [phone] to send the lab results ATTN: Mary Frank. She understands that while Dr. Doc Parada has signed her order, I cannot and will not fax it to the infusion center until I receive a copy of her positive covid lab result. I reiterated the importance to please be patient that this was not something we were able to get immediately taken care of. Currently awaiting her lab results, will fax these over along with the order to infusion center once they're received.

## 2021-11-08 NOTE — TELEPHONE ENCOUNTER
Patient called back to say she has spoke with [facility] and no insurance referral is needed as they are paid for by the government. They will fax an order here, please complete, and fax back.

## 2021-11-08 NOTE — TELEPHONE ENCOUNTER
Waldo Lund, called back to say that Vicente Tineo, has contacted her to say that this testing can be done at [address] [place] [address] and they need the order faxed to:  [phone]    Does need an order for the testing.

## 2021-11-08 NOTE — TELEPHONE ENCOUNTER
Patient called back to say she has contacted her insurance and a referral is needed for the order. A CPT code has to be on the referral order.

## 2021-11-08 NOTE — PROGRESS NOTES
Practice Supervisor Note     At approximately 2:00 PM, Ms. Anna Marie Do walked into clinic asking if we received her positive covid results from [facility]. She was wearing an N95 mask. Notified by one of the PSRs that she was up front at the desk. I promptly came to the waiting room and requested she immediately leave the waiting room and return outside. I followed her outside, keeping distanced and informed her that because she was covid positive she cannot be in clinic for the health and safety of our patients and staff. I informed her that her results were just received at the time of her arrival and that I would be faxing everything over right away. She expressed appreciation and returned to her vehicle. Repeating of sanitization protocols was followed - counters, doorknobs, and other surfaces promptly wiped down with purple wipes. All staff remained masked at this time, in addition to there being a plexiglas barrier present in between the patient and PSR staff. Patients were present in the waiting room, but at a distance of more than 12-15 feet. Exposure risk to patients and staff determined to be very low at this time.

## 2021-11-10 ENCOUNTER — HOSPITAL ENCOUNTER (OUTPATIENT)
Dept: INFUSION THERAPY | Age: 56
Discharge: HOME | End: 2021-11-10
Payer: OTHER GOVERNMENT

## 2021-11-10 VITALS
HEART RATE: 84 BPM | TEMPERATURE: 98.9 F | SYSTOLIC BLOOD PRESSURE: 97 MMHG | OXYGEN SATURATION: 99 % | RESPIRATION RATE: 18 BRPM | DIASTOLIC BLOOD PRESSURE: 56 MMHG

## 2021-11-10 PROCEDURE — M0243 CASIRIVI AND IMDEVI INFUSION: HCPCS

## 2021-11-10 PROCEDURE — 74011000258 HC RX REV CODE- 258: Performed by: FAMILY MEDICINE

## 2021-11-10 PROCEDURE — 74011000636 HC RX REV CODE- 636: Performed by: FAMILY MEDICINE

## 2021-11-10 RX ADMIN — IMDEVIMAB: 1332 INJECTION, SOLUTION, CONCENTRATE INTRAVENOUS at 13:56

## 2021-11-10 NOTE — PROGRESS NOTES
OPIC Short Note                   Date: November 10, 2021    Name: You Jiménez      Treatment: Regeneron    Ms. Rancho Liz was assessed and education was provided. PIV established in the R A/C  Symptoms began 11/2/21  Current symptoms include: cough, night sweats, headache, no taste or smell, no energy, weakness, chills and runny nose    Ms. Gerri Anguiano vitals were reviewed prior to and after treatment. Patient Vitals for the past 12 hrs:   Temp Pulse Resp BP SpO2   11/10/21 1527  84  (!) 97/56    11/10/21 1309 98.9 °F (37.2 °C) 87 18 (!) 112/91 99 %       Problem: Knowledge Deficit  Goal: *Verbalizes understanding of procedures and medications  Outcome: Progressing Towards Goal     Problem: Patient Education:  Go to Education Activity  Goal: Patient/Family Education  Outcome: Progressing Towards Goal  Medications given:  Medications Administered     casirivimab ([id number]) 600 mg, imdevimab ([id number]) 600 mg in 0.9% sodium chloride, overfill volume 120 mL IVPB     Admin Date  11/10/2021 Action  New Bag Dose   Rate  274 mL/hr Route  IntraVENous Administered By  Edy Santillan RN                Ms. Rancho Liz tolerated the treatment without complaints. Patient observed for one hour post infusion. No adverse reactions noted. PIV flushed and removed, 2x2 and coban placed. Ms. Rancho Liz was discharged from [place] [address] in stable condition at 1530. No future appointments.     Geetha Rodrigues RN  November 10, 2021  1:33 PM

## 2021-12-07 ENCOUNTER — VIRTUAL VISIT (OUTPATIENT)
Dept: FAMILY MEDICINE CLINIC | Age: 56
End: 2021-12-07
Payer: OTHER GOVERNMENT

## 2021-12-07 DIAGNOSIS — U09.9 CHRONIC POST-COVID-19 SYNDROME: ICD-10-CM

## 2021-12-07 DIAGNOSIS — J30.89 ALLERGIC RHINITIS DUE TO OTHER ALLERGIC TRIGGER, UNSPECIFIED SEASONALITY: Primary | ICD-10-CM

## 2021-12-07 PROCEDURE — 99213 OFFICE O/P EST LOW 20 MIN: CPT | Performed by: FAMILY MEDICINE

## 2021-12-07 RX ORDER — CETIRIZINE HCL 10 MG
10 TABLET ORAL DAILY
Qty: 90 TABLET | Refills: 0 | Status: SHIPPED | OUTPATIENT
Start: 2021-12-07 | End: 2022-01-05 | Stop reason: SDUPTHER

## 2021-12-07 RX ORDER — CETIRIZINE HCL 10 MG
10 TABLET ORAL DAILY
Qty: 30 TABLET | Refills: 1 | Status: SHIPPED | OUTPATIENT
Start: 2021-12-07 | End: 2021-12-07 | Stop reason: SDUPTHER

## 2021-12-07 NOTE — PROGRESS NOTES
Elidia Jacques  64 y.o. female  1965  [address] [address]  [id number]   [address]:    Telemedicine Progress Note  Jacinta Oviedo MD       Encounter Date and Time: December 7, 2021 at 3:30 PM    Consent: Elidia Jacques, who was seen by synchronous (real-time) audio-video technology, and/or her healthcare decision maker, is aware that this patient-initiated, Telehealth encounter on 12/7/2021 is a billable service, with coverage as determined by her insurance carrier. She is aware that she may receive a bill and has provided verbal consent to proceed: Yes. Chief Complaint   Patient presents with    Ear Pain     History of Present Illness   Elidia Jacques is a 64 y.o. female was evaluated by synchronous (real-time) audio-video technology from home, through a secure patient portal.  Following up on COVID and noting continued respiratory symptoms. Patient contracted COVID at the end of October. Tested poitive on 11/7 and underwent imfusion with casinvimab/imdevimab on 11/10. She did not require hospitalization or supplemental oxygen. The majority of her symptoms have improved however continues to runs out of breath easily and actually went to the urgent care a couple days ago with right ear pain (reports her ear looked fine). Has albuterol and flovent (fluticasone) from initial COVID infection. Reports she made her own hydroxychloroquine and was taking \"horse dewormer\" once weekly as well. Review of Systems   Review of Systems   Constitutional: Negative. HENT: Positive for ear pain. Respiratory: Positive for shortness of breath. Cardiovascular: Negative. Neurological: Negative. All other systems reviewed and are negative.     Vitals/Objective:     General: alert, cooperative, no distress   Mental  status: mental status: alert, oriented to person, place, and time, normal mood, behavior, speech, dress, motor activity, and thought processes   Resp: resp: normal effort and no respiratory distress   Neuro: neuro: no gross deficits   Skin: skin: no discoloration or lesions of concern on visible areas   Due to this being a TeleHealth evaluation, many elements of the physical examination are unable to be assessed. Assessment and Plan:     1. Allergic rhinitis due to other allergic trigger, unspecified seasonality  2. Chronic post-COVID-19 syndrome  Sx seem consistent with eustachian tube dysfunction, possibly as a sequella of her recent COVID infection vs allergic rhinitis or separate viral URI. Denies fevers, chills, etc.  Recommend use of Zyrtec. May consider adding flonase if symptoms persist. Expect dyspnea to continue to gradually improve. If not, may benefit from pulmonology referral for evaluation of a post-COVID syndrome. We discussed the expected course, resolution and complications of the diagnosis(es) in detail. Medication risks, benefits, costs, interactions, and alternatives were discussed as indicated. I advised her to contact the office if her condition worsens, changes or fails to improve as anticipated. She expressed understanding with the diagnosis(es) and plan. Patient understands that this encounter was a temporary measure, and the importance of further follow up and examination was emphasized. Patient verbalized understanding. Patient informed to follow up: prn  Electronically Signed: Yamilex Hernandez MD    Reginaldo Akhtar is a 64 y.o. female who was evaluated by an audio-video encounter for concerns as above. Patient identification was verified prior to start of the visit. A caregiver was present when appropriate. Due to this being a TeleHealth encounter (During NNXDT-62 public health emergency), evaluation of the following organ systems was limited: Vitals/Constitutional/EENT/Resp/CV/GI//MS/Neuro/Skin/Heme-Lymph-Imm.   Pursuant to the emergency declaration under the [address] and the National Emergencies Act, [facility] waiver authority and the [employer] and [employer] Act, this Virtual Visit was conducted, with patient's (and/or legal guardian's) consent, to reduce the patient's risk of exposure to COVID-19 and provide necessary medical care. Services were provided through a synchronous discussion virtually to substitute for in-person clinic visit. I was at home. The patient was at home. History   Patients past medical, surgical and family histories were reviewed and updated. Past Medical History:   Diagnosis Date    Acid indigestion     Esophagitis     Fast heart beat     Gallbladder attack     Gilbert syndrome     Hypercholesterolemia     Stomach pain      Past Surgical History:   Procedure Laterality Date    HX CHOLECYSTECTOMY  3/2016    HX CHOLECYSTECTOMY Right     HX GYN  2006    c-sec    HX HEENT  1970    tonsilectomy    HX OTHER SURGICAL  3/8/16    single site laparoscopic cholecystectomy    CT ABDOMEN SURGERY PROC UNLISTED       Family History   Problem Relation Age of Onset    Cancer Father     Ovarian Cancer Maternal Aunt 61     Social History     Tobacco Use    Smoking status: Never Smoker    Smokeless tobacco: Never Used   Substance Use Topics    Alcohol use: No    Drug use: Not Currently     Types: Benzodiazepines     Patient Active Problem List   Diagnosis Code    GERD (gastroesophageal reflux disease) K21.9    S/P colonoscopy Z98.890    Allergic rhinitis J30.9    Unspecified vitamin D deficiency E55.9    Menopausal state N95.1    Dry eye syndrome H04.129    Hiatal hernia K44.9    S/P cholecystectomy Z90.49    Lipid disorder E78.9          Current Medications/Allergies   Medications and Allergies reviewed:    Current Outpatient Medications   Medication Sig Dispense Refill    cetirizine (ZYRTEC) 10 mg tablet Take 1 Tablet by mouth daily.  90 Tablet 0    tretinoin (RETIN-A) 0.05 % topical cream Apply  to affected area as needed (face lesion).  45 g 3     Allergies   Allergen Reactions    Amoxicillin Swelling    Pcn [Penicillins] Swelling     Tolerated keflex

## 2022-01-05 DIAGNOSIS — J30.89 ALLERGIC RHINITIS DUE TO OTHER ALLERGIC TRIGGER, UNSPECIFIED SEASONALITY: ICD-10-CM

## 2022-01-06 RX ORDER — CETIRIZINE HCL 10 MG
10 TABLET ORAL DAILY
Qty: 90 TABLET | Refills: 3 | Status: SHIPPED | OUTPATIENT
Start: 2022-01-06 | End: 2022-07-31 | Stop reason: SDUPTHER

## 2022-01-18 DIAGNOSIS — L98.9 FACIAL LESION: ICD-10-CM

## 2022-01-18 RX ORDER — TRETINOIN 0.5 MG/G
CREAM TOPICAL AS NEEDED
Qty: 45 G | Refills: 3 | Status: SHIPPED | OUTPATIENT
Start: 2022-01-18 | End: 2022-07-31 | Stop reason: SDUPTHER

## 2022-01-18 RX ORDER — TRETINOIN 0.5 MG/G
CREAM TOPICAL AS NEEDED
Qty: 45 G | Refills: 3 | Status: SHIPPED | OUTPATIENT
Start: 2022-01-18 | End: 2022-01-18 | Stop reason: SDUPTHER

## 2022-02-05 ENCOUNTER — PATIENT MESSAGE (OUTPATIENT)
Dept: FAMILY MEDICINE CLINIC | Age: 57
End: 2022-02-05

## 2022-02-05 DIAGNOSIS — J30.89 ALLERGIC RHINITIS DUE TO OTHER ALLERGIC TRIGGER, UNSPECIFIED SEASONALITY: ICD-10-CM

## 2022-02-05 DIAGNOSIS — H92.01 EAR PAIN, RIGHT: Primary | ICD-10-CM

## 2022-02-07 NOTE — TELEPHONE ENCOUNTER
From: Sade Parr  To: Dianelys Porras MD  Sent: 2/5/2022 3:00 PM EST  Subject: Refferal Dr. Rogena Gosselin Dr. Schuyler Decent,  I had seen the ENT, Dr. Jessica Torres who drained and put a tube in my right ear and a couple of weeks ago took out the tube but today I am having a bit of ear pain in the right ear and I have no more appts left. My  Prime had given me 4 appts which I have used. I would like to request another referral for Dr. Jessica Torres. If you need the appt time first please let me know. I know since Bradley Barragan has not been there referrals have been difficult so I had not made Eh's appt yet with the urologist Dr. Snehal Bautista that you had recommended but will call Monday for an appointment and email for the referral through the teleport. If you want me to do it another way let me know.   Thank you,  Ethel Plaza

## 2022-02-08 ENCOUNTER — PATIENT MESSAGE (OUTPATIENT)
Dept: FAMILY MEDICINE CLINIC | Age: 57
End: 2022-02-08

## 2022-03-18 PROBLEM — Z90.49 S/P CHOLECYSTECTOMY: Status: ACTIVE | Noted: 2017-01-03

## 2022-03-19 PROBLEM — E78.9 LIPID DISORDER: Status: ACTIVE | Noted: 2017-01-04

## 2022-04-22 ENCOUNTER — PATIENT MESSAGE (OUTPATIENT)
Dept: FAMILY MEDICINE CLINIC | Age: 57
End: 2022-04-22

## 2022-04-22 DIAGNOSIS — Z80.0 FAMILY HISTORY OF STOMACH CANCER: ICD-10-CM

## 2022-04-22 DIAGNOSIS — Z86.19 HISTORY OF HELICOBACTER PYLORI INFECTION: ICD-10-CM

## 2022-04-22 DIAGNOSIS — Z12.11 COLON CANCER SCREENING: Primary | ICD-10-CM

## 2022-04-22 DIAGNOSIS — Z80.0 FAMILY HISTORY OF COLON CANCER: ICD-10-CM

## 2022-04-22 DIAGNOSIS — K21.9 GASTROESOPHAGEAL REFLUX DISEASE, UNSPECIFIED WHETHER ESOPHAGITIS PRESENT: ICD-10-CM

## 2022-04-27 NOTE — TELEPHONE ENCOUNTER
Orders Placed This Encounter    [facility]     Referral Priority:   Routine     Referral Type:   Consultation     Referral Reason:   Specialty Services Required     Referred to Provider:   Carina Emerson MD     Number of Visits Requested:   1     Please call [phone] to schedule your appointment with Dr. Madi Alcala then call our office back @ #[phone] to leave a message for our referral coordinator with the appointment information (date/time/providers full name) for whom you will be seeing for this referral order so that we can authorize your visit(s) with your insurance if needed and referral tracking purposes of this order.

## 2022-06-08 ENCOUNTER — TELEPHONE (OUTPATIENT)
Dept: FAMILY MEDICINE CLINIC | Age: 57
End: 2022-06-08

## 2022-06-08 NOTE — TELEPHONE ENCOUNTER
----- Message from Merlin Sicard sent at 6/7/2022 11:19 AM EDT -----  Subject: Message to Provider    QUESTIONS  Information for Provider? Pt calling to talk to Marie Fragoso in referrals as she   has some questions about her appt. and changes that were made for her   Gastro appt. Appt has be changed to 6/8 @ 10:20. Elise was called and   the date has been change there too, pt just wanted to confirm. Please have   Vonda call pt.  ---------------------------------------------------------------------------  --------------  CALL BACK INFO  What is the best way for the office to contact you? OK to leave message on   voicemail  Preferred Call Back Phone Number?  [phone]  ---------------------------------------------------------------------------  --------------  SCRIPT ANSWERS  undefined

## 2022-06-09 NOTE — H&P
Date: 2022 10:20 AM  Patient Name: Malaika Gilbert  Account #: [id number]  Gender: Female   (age): 1965 (64)  Provider:    Brandi Avila PA-C     Referring Physician:    Antione Paredes Jose Franciscocrystal Meganle[address]  [phone] (phone)  [phone] (fax)     Chief Complaint:    consult for eGD/colon. History of Present Illness: In the morning when her stomach is empty, slight disomfort, very brief. Been present for some time. Occasional regurgitation but rare-noticed it over the course of a couple months and attributes to dietary indiscretion-specifically dairy and tomatoes. She is gluten free. Normal bowel movements and GERD has improved. May have hx of HH. Feels like she feels \"something\" near her stomach, a \"knot\" sensation. Father had stomach CA. Says he ate a lot of nitrites and may have had some exposure to something that contributed. She does have hx of Hpylori. Treated for this. Then had some precancerous findings in the stomach. Saw chiropractor and this helped. She started working on increasing her \"stomach acid\" by way of taking HCL and bitters. Told \"her stomach issues have reversed\". No ETOH and never smoker. Past Medical History  Medical Conditions:   High cholesterol  Surgical Procedures:   Gallbladder surgery, 3/2016  ,  and   Dx Studies:   Colonoscopy, 2013  Endoscopy, 2013  Gastric Emptying Scan,   Pre-Procedure Call, 2016  Medications:   Zyrtec 10 mg Take 1 tablet by mouth once a day as needed  Allergies:   Cardec  Penicillins  Immunizations:   COVID Vaccine (refused)  Hep A  Hep B  Influenza vaccination (refused)  TB Test  Social History  Alcohol:   None  Tobacco:   Never smoker  Drugs:   None  Exercise:   Walking/ Running 4 times a week. Exercise 3 or more times a week.   Caffeine:   None  Marital Status:         Occupation:    Business Owner     Family History   No history of Esophogeal Cancer, IBD (Crohn's or UC), Liver disease  Father: Diagnosed with colon cancer, Family history of colon cancer, Family history of colon polyps, Gastric cancer, Stomach cancer;  Review of Systems:  Cardiovascular: Denies chest pain, irregular heart beat, palpitations, peripheral edema, syncope, Sweats. Constitutional: Denies fatigue, fever, loss of appetite, weight gain, weight loss. ENMT: Denies nose bleeds, sore throat, hearing loss. Endocrine: Denies excessive thirst, heat intolerance. Eyes: Denies loss of vision. Gastrointestinal: Presents suffers from heartburn. Denies abdominal pain, abdominal swelling, change in bowel habits, constipation, diarrhea, Bloating/gas, jaundice, nausea, rectal bleeding, stomach cramps, vomiting, dysphagia, rectal pain, Stool incontinence, hematemesis. Genitourinary: Denies dark urine, dysuria, frequent urination, hematuria, incontinence. Hematologic/Lymphatic: Denies easy bruising, prolonged bleeding. Integumentary: Denies itching, rashes, sun sensitivity. Musculoskeletal: Denies arthritis, back pain, gout, joint pain, muscle weakness, stiffness. Neurological: Denies dizziness, fainting, frequent headaches, memory loss. Psychiatric: Denies anxiety, depression, difficulty sleeping, hallucinations, nervousness, panic attacks, paranoia. Respiratory: Denies cough, dyspnea, wheezing. Vital Signs:  BP  (mmHg)  Pulse  (bpm) Weight (lbs/oz) Height (ft/in) BMI Temp  107/70 73 123 / 8 5 / 6 19.93 98.4 (F)  Physical Exam:  Constitutional:  Appearance: No distress, appears comfortable. Communication: Understands/receives spoken information. Skin:  Inspection: No rash, no jaundice. Palpation: No subcutaneous nodules. Head/face: Inspection: Normacephalic, atraumatic.   Palpation: normal.  Facial strength: appears normal.  Eyes:  Conjunctivae/lids: Normal.  Pupils/irises: Pupils equal, round and normal.  ENMT:  External: Normal.  Hearing: Normal.  Oropharynx: normal tongue, hard and soft palate; posterior pharynx without erythema, exudate or lesions. Neck:  Neck: Normal appearance, trachea midline. Thyroid: Normal to palpation. Respiratory:  Effort: Normal respiratory effort, comfortable, speaks in complete sentences. Auscultation: normal breath sounds, no rubs, wheezes or rhonchi. Cardiovascular: Auscultation: normal, S1 and S2,no gallops,no rubs or murmurs. Peripheral: no edema. Gastrointestinal/Abdomen:  Abdomen: non-distended, nontender. Liver/Spleen: normal,normal size,Liver size and consistency normal, spleen is non-palpable. Hernias: no hernias appreciated. Musculoskeletal:  Gait/station: normal.  Muscles: normal strength and tone, no atrophy or abnormal movements. Extremities:  RLE: Normal.  LLE: Normal.  Digits/Nails: Normal.  Psychiatric:  Judgment/insight: Normal,normal judgement, normal insight. Orientation: oriented to time, space and person. Memory: normal short term memory, normal long term memory, no memory loss. Mood and affect: Normal mood, affect full,no evidence of depression, anxiety or agitation. Lymphatic:  Neck: No lymphadenopathy in the cervical or supraclavicular chain. Other: No periumbilic lymphadenopathy. Lab Results:   No Electronic Results  Impressions:   Family history of stomach cancer  Screening colonoscopy (Screening for colonic neoplasia)  Personal history of Helicobacter infection  Plan:   EGD  Colonoscopy  Risk & Medical Necessity:    The level of medical decision making for this visit is moderate. The number and complexity of problems addressed are moderate. The amount and/or complexity of data to be reviewed and analyzed is moderate. The risk of complications and/or morbidity or mortality of patient management is moderate. Notes:    DR. Bran Martinez was available for consultation during the visit.        Brandi Avila PA-C    Electronically signed on 6/8/2022 11:01:20 AM by SOLOMON Whitlock MRN [id number],  1965 [facility] First Visit, Wednesday, 2022

## 2022-06-10 ENCOUNTER — ANESTHESIA (OUTPATIENT)
Dept: ENDOSCOPY | Age: 57
End: 2022-06-10
Payer: OTHER GOVERNMENT

## 2022-06-10 ENCOUNTER — ANESTHESIA EVENT (OUTPATIENT)
Dept: ENDOSCOPY | Age: 57
End: 2022-06-10
Payer: OTHER GOVERNMENT

## 2022-06-10 ENCOUNTER — HOSPITAL ENCOUNTER (OUTPATIENT)
Age: 57
Setting detail: OUTPATIENT SURGERY
Discharge: HOME | End: 2022-06-10
Attending: SPECIALIST | Admitting: SPECIALIST
Payer: OTHER GOVERNMENT

## 2022-06-10 VITALS
RESPIRATION RATE: 21 BRPM | TEMPERATURE: 98.2 F | HEIGHT: 68 IN | OXYGEN SATURATION: 100 % | BODY MASS INDEX: 18.41 KG/M2 | SYSTOLIC BLOOD PRESSURE: 105 MMHG | WEIGHT: 121.47 LBS | DIASTOLIC BLOOD PRESSURE: 64 MMHG | HEART RATE: 68 BPM

## 2022-06-10 PROCEDURE — 76060000031 HC ANESTHESIA FIRST 0.5 HR: Performed by: SPECIALIST

## 2022-06-10 PROCEDURE — 74011250636 HC RX REV CODE- 250/636: Performed by: NURSE ANESTHETIST, CERTIFIED REGISTERED

## 2022-06-10 PROCEDURE — 77030021593 HC FCPS BIOP ENDOSC BSC -A: Performed by: SPECIALIST

## 2022-06-10 PROCEDURE — 2709999900 HC NON-CHARGEABLE SUPPLY: Performed by: SPECIALIST

## 2022-06-10 PROCEDURE — 76040000019: Performed by: SPECIALIST

## 2022-06-10 PROCEDURE — 74011000250 HC RX REV CODE- 250: Performed by: NURSE ANESTHETIST, CERTIFIED REGISTERED

## 2022-06-10 PROCEDURE — 88305 TISSUE EXAM BY PATHOLOGIST: CPT

## 2022-06-10 RX ORDER — NALOXONE HYDROCHLORIDE 0.4 MG/ML
0.4 INJECTION, SOLUTION INTRAMUSCULAR; INTRAVENOUS; SUBCUTANEOUS
Status: DISCONTINUED | OUTPATIENT
Start: 2022-06-10 | End: 2022-06-10 | Stop reason: HOSPADM

## 2022-06-10 RX ORDER — DEXTROMETHORPHAN/PSEUDOEPHED 2.5-7.5/.8
1.2 DROPS ORAL
Status: DISCONTINUED | OUTPATIENT
Start: 2022-06-10 | End: 2022-06-10 | Stop reason: HOSPADM

## 2022-06-10 RX ORDER — FENTANYL CITRATE 50 UG/ML
25 INJECTION, SOLUTION INTRAMUSCULAR; INTRAVENOUS AS NEEDED
Status: DISCONTINUED | OUTPATIENT
Start: 2022-06-10 | End: 2022-06-10 | Stop reason: HOSPADM

## 2022-06-10 RX ORDER — PROPOFOL 10 MG/ML
INJECTION, EMULSION INTRAVENOUS AS NEEDED
Status: DISCONTINUED | OUTPATIENT
Start: 2022-06-10 | End: 2022-06-10 | Stop reason: HOSPADM

## 2022-06-10 RX ORDER — SODIUM CHLORIDE 9 MG/ML
50 INJECTION, SOLUTION INTRAVENOUS CONTINUOUS
Status: DISCONTINUED | OUTPATIENT
Start: 2022-06-10 | End: 2022-06-10 | Stop reason: HOSPADM

## 2022-06-10 RX ORDER — FLUMAZENIL 0.1 MG/ML
0.2 INJECTION INTRAVENOUS
Status: DISCONTINUED | OUTPATIENT
Start: 2022-06-10 | End: 2022-06-10 | Stop reason: HOSPADM

## 2022-06-10 RX ORDER — LIDOCAINE HYDROCHLORIDE 20 MG/ML
INJECTION, SOLUTION EPIDURAL; INFILTRATION; INTRACAUDAL; PERINEURAL AS NEEDED
Status: DISCONTINUED | OUTPATIENT
Start: 2022-06-10 | End: 2022-06-10 | Stop reason: HOSPADM

## 2022-06-10 RX ORDER — SODIUM CHLORIDE 9 MG/ML
INJECTION, SOLUTION INTRAVENOUS
Status: DISCONTINUED | OUTPATIENT
Start: 2022-06-10 | End: 2022-06-10 | Stop reason: HOSPADM

## 2022-06-10 RX ORDER — MIDAZOLAM HYDROCHLORIDE 1 MG/ML
.25-5 INJECTION, SOLUTION INTRAMUSCULAR; INTRAVENOUS AS NEEDED
Status: DISCONTINUED | OUTPATIENT
Start: 2022-06-10 | End: 2022-06-10 | Stop reason: HOSPADM

## 2022-06-10 RX ORDER — PHENYLEPHRINE HCL IN 0.9% NACL 0.4MG/10ML
SYRINGE (ML) INTRAVENOUS AS NEEDED
Status: DISCONTINUED | OUTPATIENT
Start: 2022-06-10 | End: 2022-06-10 | Stop reason: HOSPADM

## 2022-06-10 RX ADMIN — Medication 80 MCG: at 07:47

## 2022-06-10 RX ADMIN — PROPOFOL 70 MG: 10 INJECTION, EMULSION INTRAVENOUS at 07:51

## 2022-06-10 RX ADMIN — Medication 120 MCG: at 08:02

## 2022-06-10 RX ADMIN — Medication 80 MCG: at 07:56

## 2022-06-10 RX ADMIN — PROPOFOL 60 MG: 10 INJECTION, EMULSION INTRAVENOUS at 08:00

## 2022-06-10 RX ADMIN — PROPOFOL 20 MG: 10 INJECTION, EMULSION INTRAVENOUS at 08:04

## 2022-06-10 RX ADMIN — PROPOFOL 20 MG: 10 INJECTION, EMULSION INTRAVENOUS at 07:53

## 2022-06-10 RX ADMIN — PROPOFOL 40 MG: 10 INJECTION, EMULSION INTRAVENOUS at 07:48

## 2022-06-10 RX ADMIN — Medication 120 MCG: at 08:06

## 2022-06-10 RX ADMIN — PROPOFOL 20 MG: 10 INJECTION, EMULSION INTRAVENOUS at 08:05

## 2022-06-10 RX ADMIN — LIDOCAINE HYDROCHLORIDE 100 MG: 20 INJECTION, SOLUTION EPIDURAL; INFILTRATION; INTRACAUDAL; PERINEURAL at 07:45

## 2022-06-10 RX ADMIN — PROPOFOL 70 MG: 10 INJECTION, EMULSION INTRAVENOUS at 07:45

## 2022-06-10 RX ADMIN — SODIUM CHLORIDE: 900 INJECTION, SOLUTION INTRAVENOUS at 07:35

## 2022-06-10 NOTE — INTERVAL H&P NOTE
Pre-Endoscopy H&P Update  Chief complaint/HPI/ROS:  The indication for the procedure, the patient's history and the patient's current medications are reviewed prior to the procedure and that data is reported on the H&P to which this document is attached. Any significant complaints with regard to organ systems will be noted, and if not mentioned then a review of systems is not contributory. Past Medical History:   Diagnosis Date    Acid indigestion     Esophagitis     Fast heart beat     Gallbladder attack     Gilbert syndrome     Hypercholesterolemia     Stomach pain       Past Surgical History:   Procedure Laterality Date    HX CHOLECYSTECTOMY  3/2016    HX CHOLECYSTECTOMY Right     HX GYN  2006    c-sec    HX HEENT  1970    tonsilectomy    HX HERNIA REPAIR      HX OTHER SURGICAL  3/8/16    single site laparoscopic cholecystectomy    HI ABDOMEN SURGERY PROC UNLISTED       Social   Social History     Tobacco Use    Smoking status: Never Smoker    Smokeless tobacco: Never Used   Substance Use Topics    Alcohol use: No      Family History   Problem Relation Age of Onset    Cancer Father     Ovarian Cancer Maternal Aunt 60      Allergies   Allergen Reactions    Amoxicillin Swelling    Pcn [Penicillins] Swelling     Tolerated keflex      Prior to Admission Medications   Prescriptions Last Dose Informant Patient Reported? Taking? cetirizine (ZYRTEC) 10 mg tablet Unknown at Unknown time  No No   Sig: Take 1 Tablet by mouth daily. tretinoin (RETIN-A) 0.05 % topical cream 6/9/2022 at Unknown time  No Yes   Sig: Apply  to affected area as needed (face lesion). Facility-Administered Medications: None       PHYSICAL EXAM:  The patient is examined immediately prior to the procedure. Visit Vitals  BP 93/65   Pulse 68   Temp 98.9 °F (37.2 °C)   Resp 8   Ht 5' 8\" (1.727 m)   Wt 55.1 kg (121 lb 7.6 oz)   SpO2 99%   Breastfeeding No   BMI 18.47 kg/m²     Gen: Appears comfortable, no distress.   Pulm: comfortable respirations with no abnormal audible breath sounds  HEART: well perfused, no abnormal audible heart sounds  GI: abdomen flat. PLAN:  Informed consent discussion held, patient afforded an opportunity to ask questions and all questions answered. After being advised of the risks, benefits, and alternatives, the patient requested that we proceed and indicated so on a written consent form. Will proceed with procedure as planned.   Fifi Hayes MD

## 2022-06-10 NOTE — PROGRESS NOTES
Endoscopy discharge instructions have been reviewed and given to patient. The patient verbalized understanding and acceptance of instructions. Dr. Dane Infante  discussed with spouse procedure findings and next steps.

## 2022-06-10 NOTE — PROCEDURES
[facility] PEDRO Mejia MD  [phone]      Jackeline 10, 2022    Esophagogastroduodenoscopy & Colonoscopy Procedure Note  Carmina Alvarez  : 1965  [address] [place] [address] Medical Record Number: [id number]      Indications:    Personal history of gastric intestinal metaplasia as well as helicobacter pylori. Screening Colonoscopy   Referring Physician:  Saintclair Bonds, MD  Anesthesia/Sedation: Conscious Sedation/Moderate Sedation/MAC  Endoscopist:  Dr. Evalee Cabot  Complications:  None  Estimated Blood Loss:  None    Permit:  The indications, risks, benefits and alternatives were reviewed with the patient or their decision maker who was provided an opportunity to ask questions and all questions were answered. The specific risks of esophagogastroduodenoscopy with conscious sedation were reviewed, including but not limited to anesthetic complication, bleeding, adverse drug reaction, missed lesion, infection, IV site reactions, and intestinal perforation which would lead to the need for surgical repair. Alternatives to EGD and colonoscopy including radiographic imaging, observation without testing, or laboratory testing were reviewed as well as the limitations of those alternatives discussed. After considering the options and having all their questions answered, the patient or their decision maker provided both verbal and written consent to proceed. -----------EGD------------   Procedure in Detail:  After obtaining informed consent, positioning of the patient in the left lateral decubitus position, and conduction of a pre-procedure pause or \"time out\" the endoscope was introduced into the mouth and advanced to the duodenum. A careful inspection was made, and findings or interventions are described below.     Findings:   Esophagus:normal  Stomach: Erythema and cobblestoning of the antrum and body, but no nodules, depressed lesions, polyps or masses in the stomach noted. Kelle protocol biopsies from the antrum, incisura, and body are obtained with cold forceps. Hemostasis confirmed. Duodenum/jejunum: normal        ----------Colonoscopy-----------    Procedure in Detail:  After obtaining informed consent, positioning of the patient in the left lateral decubitus position, and conduction of a pre-procedure pause or \"time out\" the endoscope was introduced into the anus and advanced to the cecum, which was identified by the ileocecal valve and appendiceal orifice. The quality of the colonic preparation was good. A careful inspection was made as the colonoscope was withdrawn, findings and interventions are described below. Findings:   normal    ------------------------------  Specimens:    See above    Complications:   None; patient tolerated the procedure well. Impressions:  EGD:  Gastropathy, rule out persistent/recurrent h pylori. Colonoscopy: Normal colonoscopy to the cecum, with no evidence of neoplasia, diverticular disease, or mucosal abnormality. Recommendations:     - Await pathology.  -Colonoscopy in 10 years time for screening. Thank you for entrusting me with this patient's care. Please do not hesitate to contact me with any questions or if I can be of assistance with any of your other patients' GI needs. Signed By: Lon Baptiste MD                        Jackeline 10, 2022    Surgical assistant none. Implants none unless specified.

## 2022-06-10 NOTE — PROGRESS NOTES
Asaf Wilmer  1965  [id number]    Situation:  Verbal report received from:  Gershon Kawasaki, RN   Procedure: Procedure(s):  COLONOSCOPY  ESOPHAGOGASTRODUODENOSCOPY (EGD)  ESOPHAGOGASTRODUODENAL (EGD) BIOPSY    Background:    Preoperative diagnosis: FAMILY HISTORY OF STOMACH CANCER, PERSONAL HISTORY OF HPYLORI, SCREENING  Postoperative diagnosis: Gastritis  Normal Colonoscopy      :  Dr. Robin Calvo   Assistant(s): Endoscopy Technician-1: Lucero Goddard  Endoscopy RN-1: Keven Singletary RN    Specimens:   ID Type Source Tests Collected by Time Destination   1 : Gastric Antrum Biopsies Preservative   Luis Felipe Alvarez MD 6/10/2022 2500 [facility]ology   2 : Gastric Body Biopsies Preservative   Luis Felipe Alvarez MD 6/10/2022 4939 Pathology     H. Pylori  no    Assessment:  Intra-procedure medications     Anesthesia gave intra-procedure sedation and medications, see anesthesia flow sheet yes    Intravenous fluids: NS@ KVO     Vital signs stable   yes    Abdominal assessment: round and soft   yes    Recommendation:  Discharge patient per MD order  yes.   Return to floor  outpatient  Family or Friend   spouse  Permission to share finding with family or friend yes

## 2022-06-10 NOTE — ANESTHESIA POSTPROCEDURE EVALUATION
Procedure(s):  COLONOSCOPY  ESOPHAGOGASTRODUODENOSCOPY (EGD)  ESOPHAGOGASTRODUODENAL (EGD) BIOPSY.     MAC    Anesthesia Post Evaluation        Patient location during evaluation: bedside  Level of consciousness: awake  Pain management: satisfactory to patient  Airway patency: patent  Anesthetic complications: no  Cardiovascular status: acceptable  Respiratory status: acceptable  Hydration status: acceptable        INITIAL Post-op Vital signs:   Vitals Value Taken Time   /64 06/10/22 0820   Temp 36.8 °C (98.2 °F) 06/10/22 0810   Pulse 68 06/10/22 0820   Resp 21 06/10/22 0820   SpO2 100 % 06/10/22 0820

## 2022-06-10 NOTE — ANESTHESIA PREPROCEDURE EVALUATION
Anesthetic History   No history of anesthetic complications            Review of Systems / Medical History  Patient summary reviewed, nursing notes reviewed and pertinent labs reviewed    Pulmonary  Within defined limits                 Neuro/Psych   Within defined limits           Cardiovascular  Within defined limits                Exercise tolerance: >4 METS     GI/Hepatic/Renal  Within defined limits   GERD           Endo/Other  Within defined limits           Other Findings   Comments: Gilbert's syndrome           Physical Exam    Airway  Mallampati: II    Neck ROM: normal range of motion   Mouth opening: Normal     Cardiovascular  Regular rate and rhythm,  S1 and S2 normal,  no murmur, click, rub, or gallop  Rhythm: regular  Rate: normal         Dental  No notable dental hx       Pulmonary  Breath sounds clear to auscultation               Abdominal  GI exam deferred       Other Findings            Anesthetic Plan    ASA: 2  Anesthesia type: MAC          Induction: Intravenous  Anesthetic plan and risks discussed with: Patient

## 2022-06-10 NOTE — PERIOP NOTES
Report from Betzy Aguiar, see anesthesia record. ABD remains soft and non-tender post procedure. Pt has no complaints at this time and tolerated the procedure well. Endoscope was pre-cleaned at bedside immediately following procedure by Naomy Lovett.

## 2022-06-10 NOTE — DISCHARGE INSTRUCTIONS
[facility] PEDRO Ceballos MD  [phone]      Jackeline 10, 2022    Foreign Gibson  YOB: 1965    COLONOSCOPY DISCHARGE INSTRUCTIONS    If there is redness at IV site you should apply warm compress to area. If redness or soreness persist contact Dr. Evens Ceballos' or your primary care doctor. There may be a slight amount of blood passed from the rectum. Gaseous discomfort may develop, but walking, belching will help relieve this. You may not operate a vehicle for 12 hours  You may not operate machinery or dangerous appliances for rest of today  You may not drink alcoholic beverages for 12 hours  Avoid making any critical decisions for 24 hours    DIET:  You may resume your normal diet, but some patients find that heavy or large meals may lead to indigestion or vomiting. I suggest a light meal as first food intake. MEDICATIONS:  The use of some over-the-counter pain medication may lead to bleeding after colon biopsies or polyp removal.  Tylenol (also called acetaminophen) is safe to take even if you have had colonoscopy with polyp removal.  Based on the procedure you had today you may not safely take aspirin or aspirin-like products for the next ten (10) days. Remember that Tylenol (also called acetaminophen) is safe to take after colonoscopy even if you have had biopsies or polyps removed. ACTIVITY:  You may resume your normal household activities, but it is recommended that you spend the remainder of the day resting -  avoid any strenuous activity. CALL DR. Mariano Pierce' OFFICE IF:  Increasing pain, nausea, vomiting  Abdominal distension (swelling)  Significant new or increased bleeding (oral or rectal)  Fever/Chills  Chest pain/shortness of breath                       Additional instructions:   Great news, no cancer, no polyps. The colon is healthy.   The upper GI tract - no cancer, bleeding, ulcers, or suspicious findings. I took biopsies from the upper GI tract and will contact you with those results in 7-10 days. Next colonoscopy 10 years. It was an honor to be your doctor today. Please let me or my office staff know if you have any feedback about today's procedure. Isis Steel MD    Colonoscopy saves lives, and can prevent colon cancer. Everyone aged 48 or older needs colonoscopy.   Tell your family and friends: get the test!

## 2022-06-26 ENCOUNTER — PATIENT MESSAGE (OUTPATIENT)
Dept: FAMILY MEDICINE CLINIC | Age: 57
End: 2022-06-26

## 2022-06-26 DIAGNOSIS — R22.0 BUCCAL MASS: Primary | ICD-10-CM

## 2022-07-31 ENCOUNTER — PATIENT MESSAGE (OUTPATIENT)
Dept: FAMILY MEDICINE CLINIC | Age: 57
End: 2022-07-31

## 2022-07-31 DIAGNOSIS — L98.9 FACIAL LESION: ICD-10-CM

## 2022-07-31 DIAGNOSIS — J30.89 ALLERGIC RHINITIS DUE TO OTHER ALLERGIC TRIGGER, UNSPECIFIED SEASONALITY: ICD-10-CM

## 2022-08-03 RX ORDER — CETIRIZINE HCL 10 MG
10 TABLET ORAL DAILY
Qty: 90 TABLET | Refills: 3 | Status: SHIPPED | OUTPATIENT
Start: 2022-08-03

## 2022-08-03 RX ORDER — TRETINOIN 0.5 MG/G
CREAM TOPICAL AS NEEDED
Qty: 45 G | Refills: 3 | Status: SHIPPED | OUTPATIENT
Start: 2022-08-03

## 2022-10-21 ENCOUNTER — TELEPHONE (OUTPATIENT)
Dept: FAMILY MEDICINE CLINIC | Age: 57
End: 2022-10-21

## 2022-10-21 DIAGNOSIS — Z12.31 ENCOUNTER FOR SCREENING MAMMOGRAM FOR MALIGNANT NEOPLASM OF BREAST: Primary | ICD-10-CM

## 2022-10-21 NOTE — TELEPHONE ENCOUNTER
Called to discuss breast cancer screening. Last Mammogram on file was in 2019. Pt notes she likely delayed due to concerns for COVID over the past several years. She is agreeable to have her mammogram and an order has been sent. Se will jarvis the scheduling number to get this set up.

## 2022-12-12 ENCOUNTER — TELEPHONE (OUTPATIENT)
Dept: FAMILY MEDICINE CLINIC | Age: 57
End: 2022-12-12

## 2022-12-12 NOTE — TELEPHONE ENCOUNTER
----- Message from [facility] sent at 11/18/2022  8:41 AM EST -----  Subject: Referral Request    Reason for referral request? Radiologist   Provider patient wants to be referred to(if known):     Provider Phone Number(if known): Additional Information for Provider?  Please send referral to Radiology   [facility]#[phone] Date of Appt Nov 29, 2022 @ 2:00 pm.     ---------------------------------------------------------------------------  --------------  [address]    [phone]; OK to leave message on voicemail, OK to respond with   electronic message via [employer] portal (only for patients who have   registered [employer] account)  ---------------------------------------------------------------------------  --------------

## 2022-12-20 ENCOUNTER — TELEPHONE (OUTPATIENT)
Dept: FAMILY MEDICINE CLINIC | Age: 57
End: 2022-12-20

## 2022-12-21 ENCOUNTER — OFFICE VISIT (OUTPATIENT)
Dept: FAMILY MEDICINE CLINIC | Age: 57
End: 2022-12-21
Payer: OTHER GOVERNMENT

## 2022-12-21 ENCOUNTER — TELEPHONE (OUTPATIENT)
Dept: FAMILY MEDICINE CLINIC | Age: 57
End: 2022-12-21

## 2022-12-21 VITALS
SYSTOLIC BLOOD PRESSURE: 95 MMHG | DIASTOLIC BLOOD PRESSURE: 61 MMHG | HEART RATE: 62 BPM | TEMPERATURE: 98.3 F | RESPIRATION RATE: 18 BRPM | OXYGEN SATURATION: 99 %

## 2022-12-21 DIAGNOSIS — B35.4 TINEA CORPORIS: Primary | ICD-10-CM

## 2022-12-21 PROCEDURE — 99213 OFFICE O/P EST LOW 20 MIN: CPT | Performed by: FAMILY MEDICINE

## 2022-12-21 RX ORDER — ITRACONAZOLE 100 MG/1
200 CAPSULE ORAL DAILY
Qty: 14 CAPSULE | Refills: 0 | Status: SHIPPED | OUTPATIENT
Start: 2022-12-21 | End: 2022-12-28

## 2022-12-21 NOTE — PROGRESS NOTES
Identified pt with two pt identifiers(name and ). Reviewed record in preparation for visit and have obtained necessary documentation. Chief Complaint   Patient presents with    Rash     X several months, inner left arm, chest area, eye area, nose, mouth area        Health Maintenance Due   Topic    Depression Screen     COVID-19 Vaccine (1)    Shingles Vaccine (1 of 2)    DTaP/Tdap/Td series (2 - Td or Tdap)    Breast Cancer Screen Mammogram     Flu Vaccine (1)       Visit Vitals  BP 95/61 (BP 1 Location: Right upper arm, BP Patient Position: Sitting, BP Cuff Size: Small adult)   Pulse 62   Temp 98.3 °F (36.8 °C) (Oral)   Resp 18   SpO2 99%         Coordination of Care Questionnaire:  :   1) Have you been to an emergency room, urgent care, or hospitalized since your last visit? If yes, where when, and reason for visit? [facility], rash      2. Have seen or consulted any other health care provider since your last visit? If yes, where when, and reason for visit? NO        Patient is accompanied by self I have received verbal consent from Liu Rowe to discuss any/all medical information while they are present in the room.

## 2022-12-21 NOTE — PROGRESS NOTES
Chief Complaint   Patient presents with    Rash     X several months, inner left arm, chest area, eye area, nose, mouth area     Subjective   Dajuan Mayer is an 62 y.o. female who presents for follow up of tinea corporis. Infection has been present for several months. Recently seen at urgent care and was prescribed a scalp wash and topical antifungal cream. Has not picked these up yet from the pharmacy. Also has hx of eczema and has had some irritation around her eyes and mouth. Would like to see her dermatologist as she is due for her annual skin check as well. Review of Systems   Review of Systems   Constitutional:  Negative for activity change and fever. HENT:  Negative for facial swelling. Eyes:  Positive for itching. Negative for pain, discharge, redness and visual disturbance. Skin:  Positive for rash. Allergies   Allergies   Allergen Reactions    Amoxicillin Swelling    Pcn [Penicillins] Swelling     Tolerated keflex       Medications  Current Outpatient Medications   Medication Sig    itraconazole (SPORONAX) 100 mg capsule Take 2 Capsules by mouth daily for 7 days. cetirizine (ZYRTEC) 10 mg tablet Take 1 Tablet by mouth in the morning. tretinoin (RETIN-A) 0.05 % topical cream Apply  to affected area as needed (face lesion). No current facility-administered medications for this visit.        Medical History  Past Medical History:   Diagnosis Date    Acid indigestion     Esophagitis     Fast heart beat     Gallbladder attack     Gilbert syndrome     Hypercholesterolemia     Stomach pain        Immunizations   Immunization History   Administered Date(s) Administered    Influenza Vaccine PF 11/10/2014    TDAP Vaccine 04/25/2011       Objective   Vital Signs  Visit Vitals  BP 95/61 (BP 1 Location: Right upper arm, BP Patient Position: Sitting, BP Cuff Size: Small adult)   Pulse 62   Temp 98.3 °F (36.8 °C) (Oral)   Resp 18   LMP 08/06/2009   SpO2 99%       Physical Examination  Physical Exam  Constitutional:       General: She is not in acute distress. Appearance: Normal appearance. She is not ill-appearing. HENT:      Head: Normocephalic and atraumatic. Mouth/Throat:      Mouth: Mucous membranes are moist.      Pharynx: Oropharynx is clear. Eyes:      Conjunctiva/sclera: Conjunctivae normal.   Skin:     General: Skin is warm and dry. Findings: Rash (patchy areas of erythema on b/l UE appearing small round maculeswith raised red border) present. Neurological:      Mental Status: She is alert. Assessment and Plan   Shahbaz Hercules is a 62 y.o. who presents for tinea corporis follow up. 1. Tinea corporis  Advised to use topical antifungal cream that she was prescribed from urgent care. Will also rx itraconazole in case of worsening rash as was seen in her son. Patient requesting dermatology referral as well. - itraconazole (SPORONAX) 100 mg capsule; Take 2 Capsules by mouth daily for 7 days. Dispense: 14 Capsule; Refill: 0  - REFERRAL TO DERMATOLOGY           I have discussed the aforementioned diagnoses and plan with the patient in detail. I have provided information in person and/or in AVS. All questions answered prior to discharge.       I discussed this patient with Dr. Annetta Justice (Attending Physician)   Signed By:  Chris Tripathi DO    Family Medicine Resident

## 2022-12-21 NOTE — TELEPHONE ENCOUNTER
Called Ms. Jessica Ventura to follow up on the referral request for Dermatology for both her and her son. Her son was seen by [facility] on 12/16 and was said to have ringworm. Ms. Jessica Ventura [facility], and was also diagnosed with ringworm. An appointment was made with Dr. Abdulaziz Taylor for this morning. Explained that in the case of referrals, patients are often assessed by the provider placing the referral, to ensure there is necessity for the referral. This does not process immediately. There usually is a processing time so that prior authorization is obtained from the insurance. One that process is complete, the patient is made aware and the visit is scheduled. This is to ensure that all of the information needed is included, and that the claim can be processed effectively through her insurance. Ms. Jessica Ventura verbalized understanding. An office visit has been scheduled for her and her son for this evening. Explained that if the physician deems a referral to Dermatology is warranted, the order will be placed after the visit. She verbalized understanding.

## 2022-12-28 ENCOUNTER — PATIENT MESSAGE (OUTPATIENT)
Dept: FAMILY MEDICINE CLINIC | Age: 57
End: 2022-12-28

## 2023-01-11 ENCOUNTER — HOSPITAL ENCOUNTER (OUTPATIENT)
Dept: MAMMOGRAPHY | Age: 58
Discharge: HOME | End: 2023-01-11
Attending: FAMILY MEDICINE
Payer: OTHER GOVERNMENT

## 2023-01-11 DIAGNOSIS — Z12.31 ENCOUNTER FOR SCREENING MAMMOGRAM FOR MALIGNANT NEOPLASM OF BREAST: ICD-10-CM

## 2023-01-11 PROCEDURE — 77063 BREAST TOMOSYNTHESIS BI: CPT

## 2023-01-23 ENCOUNTER — PATIENT MESSAGE (OUTPATIENT)
Dept: FAMILY MEDICINE CLINIC | Age: 58
End: 2023-01-23

## 2023-02-07 ENCOUNTER — PATIENT MESSAGE (OUTPATIENT)
Dept: FAMILY MEDICINE CLINIC | Age: 58
End: 2023-02-07

## 2023-02-07 DIAGNOSIS — R79.1 ELEVATED PARTIAL THROMBOPLASTIN TIME (PTT): Primary | ICD-10-CM

## 2023-03-01 ENCOUNTER — LAB ONLY (OUTPATIENT)
Dept: FAMILY MEDICINE CLINIC | Age: 58
End: 2023-03-01

## 2023-03-01 DIAGNOSIS — R79.1 ELEVATED PARTIAL THROMBOPLASTIN TIME (PTT): ICD-10-CM

## 2023-03-02 LAB
ALBUMIN SERPL-MCNC: 4.2 G/DL (ref 3.5–5)
ALBUMIN/GLOB SERPL: 1.4 (ref 1.1–2.2)
ALP SERPL-CCNC: 53 U/L (ref 45–117)
ALT SERPL-CCNC: 24 U/L (ref 12–78)
ANION GAP SERPL CALC-SCNC: 6 MMOL/L (ref 5–15)
APTT 1H P INC PPP: 32.9 SEC
APTT IMM NP PPP: 30.2 SEC
APTT PPP: 36.6 SEC (ref 22.1–31)
APTT PPP: 37.7 SEC (ref 22.1–31)
AST SERPL-CCNC: 20 U/L (ref 15–37)
BASOPHILS # BLD: 0 K/UL (ref 0–0.1)
BASOPHILS NFR BLD: 1 % (ref 0–1)
BILIRUB SERPL-MCNC: 1.6 MG/DL (ref 0.2–1)
BUN SERPL-MCNC: 15 MG/DL (ref 6–20)
BUN/CREAT SERPL: 21 (ref 12–20)
CALCIUM SERPL-MCNC: 9.4 MG/DL (ref 8.5–10.1)
CHLORIDE SERPL-SCNC: 104 MMOL/L (ref 97–108)
CO2 SERPL-SCNC: 30 MMOL/L (ref 21–32)
CREAT SERPL-MCNC: 0.73 MG/DL (ref 0.55–1.02)
DIFFERENTIAL METHOD BLD: ABNORMAL
EOSINOPHIL # BLD: 0.1 K/UL (ref 0–0.4)
EOSINOPHIL NFR BLD: 3 % (ref 0–7)
ERYTHROCYTE [DISTWIDTH] IN BLOOD BY AUTOMATED COUNT: 12.5 % (ref 11.5–14.5)
GLOBULIN SER CALC-MCNC: 3.1 G/DL (ref 2–4)
GLUCOSE SERPL-MCNC: 86 MG/DL (ref 65–100)
HCT VFR BLD AUTO: 41.2 % (ref 35–47)
HGB BLD-MCNC: 14 G/DL (ref 11.5–16)
IMM GRANULOCYTES # BLD AUTO: 0 K/UL (ref 0–0.04)
IMM GRANULOCYTES NFR BLD AUTO: 0 % (ref 0–0.5)
INR PPP: 1.1 (ref 0.9–1.1)
LYMPHOCYTES # BLD: 1.8 K/UL (ref 0.8–3.5)
LYMPHOCYTES NFR BLD: 51 % (ref 12–49)
MCH RBC QN AUTO: 31.7 PG (ref 26–34)
MCHC RBC AUTO-ENTMCNC: 34 G/DL (ref 30–36.5)
MCV RBC AUTO: 93.2 FL (ref 80–99)
MIXING STUDIES PPP-IMP: ABNORMAL
MONOCYTES # BLD: 0.2 K/UL (ref 0–1)
MONOCYTES NFR BLD: 7 % (ref 5–13)
NEUTS SEG # BLD: 1.3 K/UL (ref 1.8–8)
NEUTS SEG NFR BLD: 38 % (ref 32–75)
NRBC # BLD: 0 K/UL (ref 0–0.01)
NRBC BLD-RTO: 0 PER 100 WBC
PLATELET # BLD AUTO: 282 K/UL (ref 150–400)
PMV BLD AUTO: 10.1 FL (ref 8.9–12.9)
POTASSIUM SERPL-SCNC: 3.9 MMOL/L (ref 3.5–5.1)
PROT SERPL-MCNC: 7.3 G/DL (ref 6.4–8.2)
PROTHROMBIN TIME: 11 SEC (ref 9–11.1)
PTT MIXING STUDY,CMS2: ABNORMAL
RBC # BLD AUTO: 4.42 M/UL (ref 3.8–5.2)
SODIUM SERPL-SCNC: 140 MMOL/L (ref 136–145)
THERAPEUTIC RANGE,PTTT: ABNORMAL SECS (ref 58–77)
WBC # BLD AUTO: 3.4 K/UL (ref 3.6–11)

## 2023-03-20 DIAGNOSIS — R79.1 ELEVATED PARTIAL THROMBOPLASTIN TIME (PTT): Primary | ICD-10-CM

## 2023-03-21 ENCOUNTER — TELEPHONE (OUTPATIENT)
Dept: FAMILY MEDICINE CLINIC | Age: 58
End: 2023-03-21

## 2023-03-21 ENCOUNTER — TELEPHONE (OUTPATIENT)
Dept: ONCOLOGY | Age: 58
End: 2023-03-21

## 2023-03-21 NOTE — TELEPHONE ENCOUNTER
Pt called and stated that she received a phone call from [facility] to schedule appt; however, she was unaware that she was being referred to them. She is requesting a phone call asap to discuss lab work and findings that is causing concerns for her to be referred to [facility].     Thank you

## 2023-03-21 NOTE — TELEPHONE ENCOUNTER
Called patient to set up NP appt. No answer.  Left vm     Np / Elevated partial thromboplastin time (PTT) / Dr. Maria M Granados

## 2023-05-23 ENCOUNTER — TELEPHONE (OUTPATIENT)
Age: 58
End: 2023-05-23

## 2023-05-23 NOTE — TELEPHONE ENCOUNTER
Dr Simon Jansen, radiology, would like patient to have a second opinion from a neurosurgeon with [facility]. He would like her to see Dr. Chan Barron with [facility]. She saw Jaye Rhodes with the same practice who wants to cut it. The phone number is [phone]. Please advise. Thanks!

## 2023-05-30 ENCOUNTER — PATIENT MESSAGE (OUTPATIENT)
Age: 58
End: 2023-05-30

## 2023-05-30 DIAGNOSIS — D18.09 FACIAL HEMANGIOMA: Primary | ICD-10-CM

## 2023-06-20 NOTE — TELEPHONE ENCOUNTER
Pt called to check the status of this referral. She stated that she is scheduled for this upcoming Thursday and need the referral to be faxed to them asap. Their fax number is [phone]. Please advise.     Thank you

## 2023-06-21 ENCOUNTER — OFFICE VISIT (OUTPATIENT)
Age: 58
End: 2023-06-21
Payer: OTHER GOVERNMENT

## 2023-06-21 ENCOUNTER — NURSE TRIAGE (OUTPATIENT)
Dept: OTHER | Facility: CLINIC | Age: 58
End: 2023-06-21

## 2023-06-21 VITALS
SYSTOLIC BLOOD PRESSURE: 98 MMHG | RESPIRATION RATE: 18 BRPM | HEART RATE: 73 BPM | TEMPERATURE: 98.3 F | OXYGEN SATURATION: 96 % | DIASTOLIC BLOOD PRESSURE: 60 MMHG

## 2023-06-21 DIAGNOSIS — S99.922A INJURY OF LEFT FOOT, INITIAL ENCOUNTER: Primary | ICD-10-CM

## 2023-06-21 DIAGNOSIS — T14.8XXA AVULSION FRACTURE: ICD-10-CM

## 2023-06-21 PROCEDURE — 99213 OFFICE O/P EST LOW 20 MIN: CPT

## 2023-06-21 SDOH — ECONOMIC STABILITY: FOOD INSECURITY: WITHIN THE PAST 12 MONTHS, THE FOOD YOU BOUGHT JUST DIDN'T LAST AND YOU DIDN'T HAVE MONEY TO GET MORE.: NEVER TRUE

## 2023-06-21 SDOH — ECONOMIC STABILITY: INCOME INSECURITY: HOW HARD IS IT FOR YOU TO PAY FOR THE VERY BASICS LIKE FOOD, HOUSING, MEDICAL CARE, AND HEATING?: NOT HARD AT ALL

## 2023-06-21 SDOH — ECONOMIC STABILITY: HOUSING INSECURITY
IN THE LAST 12 MONTHS, WAS THERE A TIME WHEN YOU DID NOT HAVE A STEADY PLACE TO SLEEP OR SLEPT IN A SHELTER (INCLUDING NOW)?: NO

## 2023-06-21 SDOH — ECONOMIC STABILITY: FOOD INSECURITY: WITHIN THE PAST 12 MONTHS, YOU WORRIED THAT YOUR FOOD WOULD RUN OUT BEFORE YOU GOT MONEY TO BUY MORE.: NEVER TRUE

## 2023-06-21 ASSESSMENT — PATIENT HEALTH QUESTIONNAIRE - PHQ9
SUM OF ALL RESPONSES TO PHQ9 QUESTIONS 1 & 2: 0
2. FEELING DOWN, DEPRESSED OR HOPELESS: 0
SUM OF ALL RESPONSES TO PHQ QUESTIONS 1-9: 0
1. LITTLE INTEREST OR PLEASURE IN DOING THINGS: 0
SUM OF ALL RESPONSES TO PHQ QUESTIONS 1-9: 0

## 2023-06-21 NOTE — PROGRESS NOTES
Chief Complaint   Patient presents with    Foot Pain     Left, injured 6/21/23, foot asleep and the once walking foot caved in and patient went down      Subjective   Nikita Lauren is an 62 y.o. female who presents for foot injury. Patient was sitting at her computer and didn't realize her foot was completely numb. As she stood up and took a step, she sustained an inversion injury to the L foot with resultant pain and swelling. She was not able to walk without significant pain afterwards. She presents in a wheelchair today. Review of Systems   See HPI    Allergies   Allergies   Allergen Reactions    Penicillins Swelling     Tolerated keflex         Medications  Current Outpatient Medications   Medication Sig    Turmeric (QC TUMERIC COMPLEX PO) Take by mouth daily    VITAMIN K PO Take by mouth daily    UNABLE TO FIND Milk thistle liver cleanse    polyethyl glyc-propyl glyc PF (SYSTANE) 0.4-0.3 % SOLN ophthalmic solution Place 2 drops into both eyes daily    tretinoin (RETIN-A) 0.05 % cream Apply topically as needed    Misc Natural Products (PRO HERBS LEG VEIN/CIRCULATION PO) Take by mouth (Patient not taking: Reported on 6/21/2023)     No current facility-administered medications for this visit.        Medical History  Past Medical History:   Diagnosis Date    Acid indigestion     Aneurysm ([address] 12/2022    Esophagitis     Fast heart beat     Gallbladder attack     Gerianne Stamp syndrome     Hypercholesterolemia     Stomach pain        Immunizations   Immunization History   Administered Date(s) Administered    Influenza, Trivalent PF 11/10/2014    TDaP, ADACEL (age 10y-63y), [address] (age 10y+), IM, 0.5mL 04/25/2011       Objective   Vital Signs  BP 98/60 (Site: Right Upper Arm, Position: Sitting, Cuff Size: Small Adult)   Pulse 73   Temp 98.3 °F (36.8 °C) (Oral)   Resp 18   SpO2 96%     Physical Examination  General appearance - alert, well appearing, and in no distress  Chest - clear to auscultation, no wheezes,

## 2023-06-21 NOTE — PROGRESS NOTES
Identified pt with two pt identifiers(name and ). Reviewed record in preparation for visit and have obtained necessary documentation. Chief Complaint   Patient presents with    Foot Pain     Left, injured 23, foot asleep and the once walking foot caved in and patient went down        Health Maintenance Due   Topic    COVID-19 Vaccine (1)    Hepatitis C screen     Shingles vaccine (1 of 2)    DTaP/Tdap/Td vaccine (2 - Td or Tdap)       Vitals:    23 1754   BP: 98/60   Site: Right Upper Arm   Position: Sitting   Cuff Size: Small Adult   Pulse: 73   Resp: 18   Temp: 98.3 °F (36.8 °C)   TempSrc: Oral   SpO2: 96%           Coordination of Care Questionnaire:  :   1. Have you been to the ER, urgent care clinic since your last visit? Hospitalized since your last visit? No    2. Have you seen or consulted any other health care providers outside of the [address] [place] [address] since your last visit? Include any pap smears or colon screening. No    This patient is accompanied in the office by her child. I have received verbal consent from Sandie Vallejo to discuss any/all medical information while they are present in the room.

## 2023-06-21 NOTE — TELEPHONE ENCOUNTER
[facility] [place] [facility]    Received call from [address] [facility] with [employer] Complaint. Subjective: Caller states \"I had legs crossed all morning and got up. Foot was asleep and went to walk on it and it caved in. The outside of foot caved in and I went down to ground. It immediately swelled. Its about the size of golfball on side. A little lime green. \"     Current Symptoms: left foot swollen, bruising, pain    Onset: 1 hour ago; worsening    Associated Symptoms: reduced activity    Pain Severity: 6/10; feels like ligaments stretching; constant    Temperature: denies fever by unknown method    What has been tried: Ibuprofen    LMP: NA Pregnant: NA    Recommended disposition: See in Office Today    Care advice provided, patient verbalizes understanding; denies any other questions or concerns; instructed to call back for any new or worsening symptoms. Patient/Caller agrees with recommended disposition; writer provided warm transfer to [facility] for appointment scheduling    Attention Provider: Thank you for allowing me to participate in the care of your patient. The patient was connected to triage in response to information provided to the ECC/PSC. Please do not respond through this encounter as the response is not directed to a shared pool. Reason for Disposition   SEVERE pain (e.g., excruciating)    Protocols used:  Ankle and Foot Injury-ADULT-[place]

## 2023-06-28 ENCOUNTER — TELEPHONE (OUTPATIENT)
Age: 58
End: 2023-06-28

## 2023-06-29 DIAGNOSIS — R79.1 ELEVATED PARTIAL THROMBOPLASTIN TIME (PTT): Primary | ICD-10-CM

## 2023-06-30 ENCOUNTER — TELEPHONE (OUTPATIENT)
Age: 58
End: 2023-06-30

## 2023-06-30 DIAGNOSIS — R79.1 ELEVATED PARTIAL THROMBOPLASTIN TIME (PTT): Primary | ICD-10-CM

## 2023-07-05 DIAGNOSIS — R79.1 ELEVATED PARTIAL THROMBOPLASTIN TIME (PTT): Primary | ICD-10-CM

## 2023-07-06 ENCOUNTER — OFFICE VISIT (OUTPATIENT)
Age: 58
End: 2023-07-06

## 2023-07-06 VITALS
TEMPERATURE: 98.3 F | DIASTOLIC BLOOD PRESSURE: 56 MMHG | HEART RATE: 70 BPM | WEIGHT: 128 LBS | HEIGHT: 68 IN | SYSTOLIC BLOOD PRESSURE: 97 MMHG | BODY MASS INDEX: 19.4 KG/M2 | OXYGEN SATURATION: 93 %

## 2023-07-06 DIAGNOSIS — S92.355D NONDISPLACED FRACTURE OF FIFTH METATARSAL BONE, LEFT FOOT, SUBSEQUENT ENCOUNTER FOR FRACTURE WITH ROUTINE HEALING: Primary | ICD-10-CM

## 2023-07-06 ASSESSMENT — PATIENT HEALTH QUESTIONNAIRE - PHQ9
1. LITTLE INTEREST OR PLEASURE IN DOING THINGS: 0
SUM OF ALL RESPONSES TO PHQ9 QUESTIONS 1 & 2: 0
SUM OF ALL RESPONSES TO PHQ QUESTIONS 1-9: 0
2. FEELING DOWN, DEPRESSED OR HOPELESS: 0
SUM OF ALL RESPONSES TO PHQ QUESTIONS 1-9: 0

## 2023-07-06 NOTE — PROGRESS NOTES
[address] [facility]      Chief Complaint:   Chief Complaint   Patient presents with    Follow-up     Left foot         Subjective:     Gilmar Arora is an 62 y.o. female who presents with follow-up for left ankle 5th metatarsal fracture. She still using the boot except for showering and sleeping. Some swelling has improved. Reports some soreness over the 5th metatarsal but gradually improving. She has started putting weight on foot today with crutch assisted walking. She hasn't needed any pain medications PRN. Past Medical History:   Diagnosis Date    Acid indigestion     Aneurysm ([address]) 12/2022    Esophagitis     Fast heart beat     Gallbladder attack     Ollis Callander syndrome     Hypercholesterolemia     Stomach pain      Family History   Problem Relation Age of Onset    Stomach Cancer Father     Cancer Father     Ovarian Cancer Maternal Aunt 60     Current Outpatient Medications   Medication Sig Dispense Refill    Turmeric (QC TUMERIC COMPLEX PO) Take by mouth daily      VITAMIN K PO Take by mouth daily      UNABLE TO FIND Milk thistle liver cleanse      polyethyl glyc-propyl glyc PF (SYSTANE) 0.4-0.3 % SOLN ophthalmic solution Place 2 drops into both eyes daily      tretinoin (RETIN-A) 0.05 % cream Apply topically as needed      Misc Natural Products (PRO HERBS LEG VEIN/CIRCULATION PO) Take by mouth (Patient not taking: Reported on 7/6/2023)       No current facility-administered medications for this visit.      Allergies   Allergen Reactions    Penicillins Swelling     Tolerated keflex       Social History     Socioeconomic History    Marital status:      Spouse name: Not on file    Number of children: Not on file    Years of education: Not on file    Highest education level: Not on file   Occupational History    Not on file   Tobacco Use    Smoking status: Never    Smokeless tobacco: Never   Vaping Use    Vaping

## 2023-07-20 ENCOUNTER — OFFICE VISIT (OUTPATIENT)
Age: 58
End: 2023-07-20
Payer: OTHER GOVERNMENT

## 2023-07-20 VITALS
SYSTOLIC BLOOD PRESSURE: 96 MMHG | HEART RATE: 83 BPM | DIASTOLIC BLOOD PRESSURE: 64 MMHG | BODY MASS INDEX: 19.46 KG/M2 | OXYGEN SATURATION: 97 % | WEIGHT: 128 LBS

## 2023-07-20 DIAGNOSIS — S92.355D NONDISPLACED FRACTURE OF FIFTH METATARSAL BONE, LEFT FOOT, SUBSEQUENT ENCOUNTER FOR FRACTURE WITH ROUTINE HEALING: Primary | ICD-10-CM

## 2023-07-20 PROCEDURE — 99213 OFFICE O/P EST LOW 20 MIN: CPT | Performed by: FAMILY MEDICINE

## 2023-07-24 NOTE — PROGRESS NOTES
Chief Complaint   Patient presents with    Follow-up     Left foot     Vitals:    07/20/23 1125   BP: 96/64   Pulse: 83   SpO2: 97%
metatarsal.     Assessment:      Diagnosis Orders   1. Nondisplaced fracture of fifth metatarsal bone, left foot, subsequent encounter for fracture with routine healing  XR FOOT LEFT (MIN 3 VIEWS)        Appears to be healing well. Plan:       1. Home Exercise Program as per handout. Referred to PT  2. Transition to sturdy walking shoe    Medications:    1. Naproxin (Aleve): 220mg 1-2 tablets twice a day PRN. 2. Acetaminophen (Tylenol):  500mg 1-2 tablets every 6 hours as needed for pain.     RTC: 4  weeks

## 2023-08-03 ENCOUNTER — OFFICE VISIT (OUTPATIENT)
Age: 58
End: 2023-08-03
Payer: OTHER GOVERNMENT

## 2023-08-03 VITALS — OXYGEN SATURATION: 98 % | DIASTOLIC BLOOD PRESSURE: 65 MMHG | HEART RATE: 77 BPM | SYSTOLIC BLOOD PRESSURE: 107 MMHG

## 2023-08-03 DIAGNOSIS — S93.402D SPRAIN OF LEFT ANKLE, UNSPECIFIED LIGAMENT, SUBSEQUENT ENCOUNTER: Primary | ICD-10-CM

## 2023-08-03 DIAGNOSIS — S92.355D NONDISPLACED FRACTURE OF FIFTH METATARSAL BONE, LEFT FOOT, SUBSEQUENT ENCOUNTER FOR FRACTURE WITH ROUTINE HEALING: ICD-10-CM

## 2023-08-03 PROCEDURE — 99213 OFFICE O/P EST LOW 20 MIN: CPT | Performed by: FAMILY MEDICINE

## 2023-08-03 NOTE — PROGRESS NOTES
[address] Sports Medicine      Chief Complaint:   Chief Complaint   Patient presents with    Follow-up         Subjective:     Curtis Villegas is an 62 y.o. female who presents for follow up of left ankle/foot pain. She had an ankle sprain and 5th metatarsal avulsion fracture 6 weeks ago. Radiographs showed good healing 2 weeks ago. She has been walking in athletic shoes without any assistive devices. She is concerned that she still has some pain while walking. Bruising and swelling have resolved. Past Medical History:   Diagnosis Date    Acid indigestion     Aneurysm ([address]) 12/2022    Esophagitis     Fast heart beat     Gallbladder attack     Val Korina syndrome     Hypercholesterolemia     Stomach pain      Family History   Problem Relation Age of Onset    Stomach Cancer Father     Cancer Father     Ovarian Cancer Maternal Aunt 60     Current Outpatient Medications   Medication Sig Dispense Refill    Turmeric (QC TUMERIC COMPLEX PO) Take by mouth daily      VITAMIN K PO Take by mouth daily      UNABLE TO FIND Milk thistle liver cleanse      polyethyl glyc-propyl glyc PF (SYSTANE) 0.4-0.3 % SOLN ophthalmic solution Place 2 drops into both eyes daily      tretinoin (RETIN-A) 0.05 % cream Apply topically as needed      Misc Natural Products (PRO HERBS LEG VEIN/CIRCULATION PO) Take by mouth (Patient not taking: Reported on 7/6/2023)       No current facility-administered medications for this visit.      Allergies   Allergen Reactions    Penicillins Swelling     Tolerated keflex       Social History     Socioeconomic History    Marital status:      Spouse name: Not on file    Number of children: Not on file    Years of education: Not on file    Highest education level: Not on file   Occupational History    Not on file   Tobacco Use    Smoking status: Never    Smokeless tobacco: Never   Vaping Use    Vaping Use: Never used

## 2023-08-03 NOTE — PROGRESS NOTES
Chief Complaint   Patient presents with    Follow-up     Vitals:    08/03/23 1114   BP: 107/65   Pulse: 77   SpO2: 98%      Patient states she's still having pain when she walks, she states it is better an feels like she doesn't have her normal walk. She feel like her gait is still off. She states it does get swollen at night.

## 2023-08-14 DIAGNOSIS — S99.922A INJURY OF LEFT FOOT, INITIAL ENCOUNTER: ICD-10-CM

## 2023-08-14 DIAGNOSIS — S93.402D SPRAIN OF LEFT ANKLE, UNSPECIFIED LIGAMENT, SUBSEQUENT ENCOUNTER: Primary | ICD-10-CM

## 2023-08-14 DIAGNOSIS — S92.355D NONDISPLACED FRACTURE OF FIFTH METATARSAL BONE, LEFT FOOT, SUBSEQUENT ENCOUNTER FOR FRACTURE WITH ROUTINE HEALING: ICD-10-CM

## 2023-08-30 ENCOUNTER — OFFICE VISIT (OUTPATIENT)
Age: 58
End: 2023-08-30

## 2023-08-30 VITALS
RESPIRATION RATE: 16 BRPM | OXYGEN SATURATION: 97 % | DIASTOLIC BLOOD PRESSURE: 61 MMHG | HEART RATE: 68 BPM | SYSTOLIC BLOOD PRESSURE: 108 MMHG

## 2023-08-30 DIAGNOSIS — R21 ACUTE BLISTERING ERUPTION OF SKIN: Primary | ICD-10-CM

## 2023-08-30 RX ORDER — CLOTRIMAZOLE AND BETAMETHASONE DIPROPIONATE 10; .64 MG/G; MG/G
CREAM TOPICAL
Qty: 45 G | Refills: 0 | Status: SHIPPED | OUTPATIENT
Start: 2023-08-30

## 2023-08-30 NOTE — PATIENT INSTRUCTIONS
- if your symptoms do not improve with conservative eczema treatment measures, a prescription for lotrisone has been sent to your pharmacy. Do not use this medication for longer than 14 days.   - a referral has been placed to your dermatologist

## 2023-08-30 NOTE — PROGRESS NOTES
[facility]  [place]   Office [phone], Fax [phone]      Chief Complaint:     Chief Complaint   Patient presents with    Skin Problem       SUBJECTIVE  Ana Jauregui is an 62 y.o. female who presents for rash/blistering of the skin to the lower left shin    #rash  - onset about 2 weeks ago while at the beach in [place]. Growing in size  - reports pruritis  - denies new soaps, detergents, or topical substances to the area  - denies new medication  - denies recent hiking  - h/o eczema usually to the hands, chest  - sodium chloride spray to the area with some relief  - denies knee pain, joint, fever  - patient reports similar occurrence and appear associated with prior diagnosis of eczema. She was initially diagnosed with ringworm, but was later told by a dermatologist that it was eczema. Allergies   Allergies   Allergen Reactions    Penicillins Swelling     Tolerated keflex           Medications   Current Outpatient Medications   Medication Sig    clotrimazole-betamethasone (LOTRISONE) 1-0.05 % cream Apply topically 2 times daily. Turmeric (QC TUMERIC COMPLEX PO) Take by mouth daily    VITAMIN K PO Take by mouth daily    UNABLE TO FIND Milk thistle liver cleanse    polyethyl glyc-propyl glyc PF (SYSTANE) 0.4-0.3 % SOLN ophthalmic solution Place 2 drops into both eyes daily    tretinoin (RETIN-A) 0.05 % cream Apply topically as needed     No current facility-administered medications for this visit. Past surgical, medical and social history reviewed and updated as relevant to presenting concerns.      ROS: See HPI      OBJECTIVE  /61 (Site: Left Upper Arm, Position: Sitting, Cuff Size: Medium Adult)   Pulse 68   Resp 16   SpO2 97%     Physical Exam  General appearance - alert, well appearing, and in no distress  Mouth - mucous membranes moist, pharynx normal without lesions  Neck - supple, no significant

## 2023-09-22 ENCOUNTER — OFFICE VISIT (OUTPATIENT)
Age: 58
End: 2023-09-22
Payer: OTHER GOVERNMENT

## 2023-09-22 VITALS — HEART RATE: 62 BPM | DIASTOLIC BLOOD PRESSURE: 59 MMHG | SYSTOLIC BLOOD PRESSURE: 99 MMHG | OXYGEN SATURATION: 99 %

## 2023-09-22 DIAGNOSIS — S92.355D NONDISPLACED FRACTURE OF FIFTH METATARSAL BONE, LEFT FOOT, SUBSEQUENT ENCOUNTER FOR FRACTURE WITH ROUTINE HEALING: Primary | ICD-10-CM

## 2023-09-22 PROCEDURE — 99213 OFFICE O/P EST LOW 20 MIN: CPT | Performed by: FAMILY MEDICINE

## 2023-09-22 NOTE — PROGRESS NOTES
Patient stated she is doing PT twice a week. She's gaining her strength back slowly.   Chief Complaint   Patient presents with    Follow-up     Vitals:    09/22/23 1501   BP: (!) 99/59   Pulse: 62   SpO2: 99%

## 2023-09-22 NOTE — PROGRESS NOTES
[address] Sports Medicine      Chief Complaint:   Chief Complaint   Patient presents with    Follow-up         Subjective:     Pako Jennings is an 62 y.o. female who presents for follow up of left 5th metatarsal fracture. She is about 12 weeks out from her injury. She reports feeling better. No pain. She has been doing PT and HEP. Past Medical History:   Diagnosis Date    Acid indigestion     Aneurysm ([address]) 12/2022    Esophagitis     Fast heart beat     Gallbladder attack     Wyvonnia Martini syndrome     Hypercholesterolemia     Stomach pain      Family History   Problem Relation Age of Onset    Stomach Cancer Father     Cancer Father     Ovarian Cancer Maternal Aunt 60     Current Outpatient Medications   Medication Sig Dispense Refill    Turmeric (QC TUMERIC COMPLEX PO) Take by mouth daily      VITAMIN K PO Take by mouth daily      UNABLE TO FIND Milk thistle liver cleanse      polyethyl glyc-propyl glyc PF (SYSTANE) 0.4-0.3 % SOLN ophthalmic solution Place 2 drops into both eyes daily      tretinoin (RETIN-A) 0.05 % cream Apply topically as needed      clotrimazole-betamethasone (LOTRISONE) 1-0.05 % cream Apply topically 2 times daily. 45 g 0     No current facility-administered medications for this visit.      Allergies   Allergen Reactions    Penicillins Swelling     Tolerated keflex       Social History     Socioeconomic History    Marital status:      Spouse name: Not on file    Number of children: Not on file    Years of education: Not on file    Highest education level: Not on file   Occupational History    Not on file   Tobacco Use    Smoking status: Never    Smokeless tobacco: Never   Vaping Use    Vaping Use: Never used   Substance and Sexual Activity    Alcohol use: No    Drug use: Never     Types: Benzodiazepines (Downers/Zannies)    Sexual activity: Yes     Partners: Male     Birth control/protection: Condom   Other

## 2023-11-12 ENCOUNTER — PATIENT MESSAGE (OUTPATIENT)
Age: 58
End: 2023-11-12

## 2023-11-12 DIAGNOSIS — Z12.83 SKIN CANCER SCREENING: Primary | ICD-10-CM

## 2023-11-15 NOTE — TELEPHONE ENCOUNTER
----- Message from Skylar Fernandez sent at 11/12/2023  9:51 AM EST -----  Regarding: Prescription Refill-Marcia Virk-  Contact: [phone]  Henry Perez Memos,  Would you please refill my prescription of:  \"tretinoin 0.05 % cream\"    Thank you!   Freeman Kovacs

## 2023-11-15 NOTE — TELEPHONE ENCOUNTER
Medication Refill Request    Miya Verdin is requesting a refill of the following medication(s):   Requested Prescriptions     Pending Prescriptions Disp Refills    tretinoin (RETIN-A) 0.05 % cream       Sig: Apply topically as needed        Listed PCP is Melissa Cheema MD   Last provider to prescribe medication: Dr. Jessica Anderson on 08/03/2022  Date of Last Office Visit at [facility]: 09/22/2023 with Dr. Luis Pineda for [facility] visit  FUTURE APPOINTMENT: No future appointments. Please send refill to:    [facility] [phone]  [address] [place] [id number]  Phone: [phone] Fax: [phone]      Please review request and approve or deny with recommendations.

## 2023-11-16 RX ORDER — TRETINOIN 0.5 MG/G
CREAM TOPICAL PRN
OUTPATIENT
Start: 2023-11-16

## 2023-11-16 RX ORDER — TRETINOIN 0.5 MG/G
CREAM TOPICAL
Qty: 45 G | Refills: 3 | Status: SHIPPED | OUTPATIENT
Start: 2023-11-16

## 2024-01-29 ENCOUNTER — OFFICE VISIT (OUTPATIENT)
Age: 59
End: 2024-01-29
Payer: OTHER GOVERNMENT

## 2024-01-29 VITALS
OXYGEN SATURATION: 98 % | SYSTOLIC BLOOD PRESSURE: 95 MMHG | DIASTOLIC BLOOD PRESSURE: 60 MMHG | RESPIRATION RATE: 30 BRPM | HEART RATE: 69 BPM

## 2024-01-29 DIAGNOSIS — M25.819 SHOULDER IMPINGEMENT: Primary | ICD-10-CM

## 2024-01-29 PROCEDURE — 99213 OFFICE O/P EST LOW 20 MIN: CPT | Performed by: FAMILY MEDICINE

## 2024-01-29 ASSESSMENT — PATIENT HEALTH QUESTIONNAIRE - PHQ9
SUM OF ALL RESPONSES TO PHQ QUESTIONS 1-9: 0
1. LITTLE INTEREST OR PLEASURE IN DOING THINGS: 0
SUM OF ALL RESPONSES TO PHQ QUESTIONS 1-9: 0
2. FEELING DOWN, DEPRESSED OR HOPELESS: 0
SUM OF ALL RESPONSES TO PHQ9 QUESTIONS 1 & 2: 0

## 2024-01-29 NOTE — PROGRESS NOTES
Chief Complaint   Patient presents with    Shoulder Pain     right     Vitals:    01/29/24 1114   BP: 95/60   Pulse: 69   Resp: 30   SpO2: 98%      Patient states on Tuesday 01/23/2024 she was at an appt and the Doc told her to try to pressure against his hands and she did and a few mins later she felt a sharp and clicking pain in her right shoulder. She denies numbness and tingling sensation. She states its tender sometimes

## 2024-01-29 NOTE — PROGRESS NOTES
[facility]   [facility]      Chief Complaint:   Chief Complaint   Patient presents with    Shoulder Pain     right       History of Present Illness     Patient Identification  Marcia Virk is a 58 y.o. female complains of pain in the right shoulder pain 1 week.  Started after shoulder exam with ENT specialist.  No neck pain.  No radiating pain.  No self treatments.     Past Medical History:   Diagnosis Date    Acid indigestion     Aneurysm ([facility]) 12/2022    Esophagitis     Fast heart beat     Gallbladder attack     Gilbert syndrome     Hypercholesterolemia     Stomach pain      Family History   Problem Relation Age of Onset    Stomach Cancer Father     Cancer Father     Ovarian Cancer Maternal Aunt 60     Current Outpatient Medications   Medication Sig Dispense Refill    tretinoin (RETIN-A) 0.05 % cream APPLY TO AFFECTED AREA(S) AS NEEDED (FACE LESION) 45 g 3    Turmeric (QC TUMERIC COMPLEX PO) Take by mouth daily      VITAMIN K PO Take by mouth daily      UNABLE TO FIND Milk thistle liver cleanse      polyethyl glyc-propyl glyc PF (SYSTANE) 0.4-0.3 % SOLN ophthalmic solution Place 2 drops into both eyes daily      clotrimazole-betamethasone (LOTRISONE) 1-0.05 % cream Apply topically 2 times daily. 45 g 0     No current facility-administered medications for this visit.     Allergies   Allergen Reactions    Penicillins Swelling     Tolerated keflex         Review of Systems  Pertinent items are noted in HPI.     Physical Exam     BP 95/60 (Site: Right Upper Arm, Position: Sitting, Cuff Size: Medium Adult)   Pulse 69   Resp 30   SpO2 98%     GEN: Well appearing. No apparent distress. Responds to all questions appropriately.  Lungs: No labored respirations.  Talking in complete sentences without difficulty.  Shoulder: right  Deformity: None    ROM:  Forward Flexion: Active: 180     ER (0): Active: 45     IR (0): Active: Behind the

## 2024-02-08 ENCOUNTER — OFFICE VISIT (OUTPATIENT)
Age: 59
End: 2024-02-08
Payer: OTHER GOVERNMENT

## 2024-02-08 VITALS
HEART RATE: 73 BPM | SYSTOLIC BLOOD PRESSURE: 104 MMHG | BODY MASS INDEX: 19.46 KG/M2 | DIASTOLIC BLOOD PRESSURE: 58 MMHG | RESPIRATION RATE: 14 BRPM | OXYGEN SATURATION: 97 % | HEIGHT: 68 IN | TEMPERATURE: 98.7 F

## 2024-02-08 DIAGNOSIS — M35.7 HYPERMOBILITY SYNDROME: ICD-10-CM

## 2024-02-08 DIAGNOSIS — M26.629 ARTHRALGIA OF TEMPOROMANDIBULAR JOINT, UNSPECIFIED LATERALITY: Primary | ICD-10-CM

## 2024-02-08 DIAGNOSIS — M26.639 ARTICULAR DISC DISORDER OF TEMPOROMANDIBULAR JOINT: ICD-10-CM

## 2024-02-08 PROCEDURE — 99213 OFFICE O/P EST LOW 20 MIN: CPT | Performed by: FAMILY MEDICINE

## 2024-02-23 ENCOUNTER — PATIENT MESSAGE (OUTPATIENT)
Age: 59
End: 2024-02-23

## 2024-03-05 NOTE — PROGRESS NOTES
Marcia Virk  58 y.o. female  1965  [address]  [facility] [address]  [id number]   [facility]: Progress Note  Levar Jean MD       Encounter Date: 2/8/2024    Chief Complaint   Patient presents with    Referral - General     History of Present Illness   Marcia Virk is a 58 y.o. female who presents to clinic today for follow-up on dental concerns.  Ms. Virk discussed a long history of dental procedures that have not repaired or potentially worsened her dental conditions.  She had an assessment which demonstrated TMJ arthralgia, aricular disc disorder,  Rajeev Syndrome, Hypermobility and a question of LANDRY.  She is interested in pursing additional evaluation and treatment by Dr. Jero Mark, a temporomandibular dysfunction specialist here in [place].       Review of Systems   Review of Systems - Jaw pain, tongue scalloping     Vitals/Objective:     Vitals:    02/08/24 1149   BP: (!) 104/58   Pulse: 73   Resp: 14   Temp: 98.7 °F (37.1 °C)   SpO2: 97%   Height: 1.727 m (5' 8\")     Body mass index is 19.46 kg/m².    Physical Exam  HENT:      Head:      Jaw: Tenderness (Over TMJs) present.      Mouth/Throat:      Tongue: Tongue lesions: scalloping.   Cardiovascular:      Rate and Rhythm: Normal rate and regular rhythm.   Pulmonary:      Effort: Pulmonary effort is normal.         Assessment and Plan:   1. 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 Hypermobility syndrome  Would benefit from further assessment of TMJ, as well Rajeev Syndrome as potential cause of her pain.  Referral placed for Dr. Mark who specializes in specific treatments for both.   - External Referral To Dentistry    I have discussed the diagnosis with the patient and the intended plan as seen in the above orders.  she has expressed understanding.  The patient has received an after-visit summary and questions

## 2024-03-07 NOTE — TELEPHONE ENCOUNTER
Patient stated that she talk to Armen and they stated that all you need to do is add more information as to why she needs to see specifically Dr. Mark. It needs to be detailed so that Armen would approved the referral.

## 2024-03-07 NOTE — TELEPHONE ENCOUNTER
Hi Dr Jean,    Patient is asking that you give her a call. She stated that it may have been some miscommunication and its probably easier if you give her a call. She also stated that a referral for a sleep study was suppose to be placed. She would like to see Dr. Hollingsworth.

## 2024-03-18 ENCOUNTER — ANCILLARY PROCEDURE (OUTPATIENT)
Age: 59
End: 2024-03-18
Payer: OTHER GOVERNMENT

## 2024-03-18 ENCOUNTER — OFFICE VISIT (OUTPATIENT)
Age: 59
End: 2024-03-18
Payer: OTHER GOVERNMENT

## 2024-03-18 VITALS
BODY MASS INDEX: 19.4 KG/M2 | HEIGHT: 68 IN | HEART RATE: 74 BPM | WEIGHT: 128 LBS | RESPIRATION RATE: 14 BRPM | SYSTOLIC BLOOD PRESSURE: 94 MMHG | DIASTOLIC BLOOD PRESSURE: 63 MMHG | OXYGEN SATURATION: 94 % | TEMPERATURE: 98.7 F

## 2024-03-18 DIAGNOSIS — R10.9 ABDOMINAL PAIN, UNSPECIFIED ABDOMINAL LOCATION: Primary | ICD-10-CM

## 2024-03-18 DIAGNOSIS — R10.9 ABDOMINAL PAIN, UNSPECIFIED ABDOMINAL LOCATION: ICD-10-CM

## 2024-03-18 DIAGNOSIS — G47.9 SLEEP DISTURBANCES: ICD-10-CM

## 2024-03-18 DIAGNOSIS — M41.9 SCOLIOSIS OF THORACOLUMBAR SPINE, UNSPECIFIED SCOLIOSIS TYPE: ICD-10-CM

## 2024-03-18 PROCEDURE — 99213 OFFICE O/P EST LOW 20 MIN: CPT | Performed by: FAMILY MEDICINE

## 2024-03-18 PROCEDURE — 74018 RADEX ABDOMEN 1 VIEW: CPT | Performed by: FAMILY MEDICINE

## 2024-03-18 PROCEDURE — 74018 RADEX ABDOMEN 1 VIEW: CPT | Performed by: RADIOLOGY

## 2024-03-18 ASSESSMENT — ENCOUNTER SYMPTOMS
BACK PAIN: 1
ABDOMINAL PAIN: 1

## 2024-03-19 NOTE — PROGRESS NOTES
Marcia Virk  58 y.o. female  1965  [address]  [facility] [address]  [id number]   [facility]: Progress Note  Levar Jean MD       Encounter Date: 3/18/2024    Chief Complaint   Patient presents with    Pain     7-10     History of Present Illness   Marcia Virk is a 58 y.o. female who presents to clinic today for about 7-10 days of right sided abdominal and back pain.  Initially was intermittent, and typically would cause pain at different times.  Over the past 24 days, the pain has been more consistent.  Describes the aching pain similar to when she was healing after abdominal surgery.  No fevers, chills, N/V/D.  May feel a bit constipated with recent dietary indiscretions  and has been trying to change her diet to helps with this.   Does also note an event abotu 7 days prior to the onset of her pains when she was getting her hair wash, developed a stiff neck and required using her arm/core to help her sit up supporting the head.  Didn't immediately feel pain at that time.     Possible sleep apnea: Follow-up from last visit. Specialist raised concerns for sleep disordered breathing and possible LANDRY.  Recommended sleep study.  Will refer to sleep medicine.  Review of Systems   Review of Systems   Gastrointestinal:  Positive for abdominal pain.   Musculoskeletal:  Positive for back pain.       Vitals/Objective:     Vitals:    03/18/24 1912   BP: 94/63   Pulse: 74   Resp: 14   Temp: 98.7 °F (37.1 °C)   SpO2: 94%   Weight: 58.1 kg (128 lb)   Height: 1.727 m (5' 8\")     Body mass index is 19.46 kg/m².    Physical Exam  Abdominal:      Hernia: A hernia is present. Hernia is present in the umbilical area (Easily reducible, nontender).       Musculoskeletal:      Lumbar back: Tenderness present. No spasms.        Back:            KUB: I personally reviewed the images and interpret it as follows. Evidence of stool in ascending colon.  No evidence of obstruction Mild  Subjective:       Patient ID: Lorelei Norris is a 48 y.o. female.    Chief Complaint: Swollen tonsils    HPI   Patient is a 48 year old female with a history of HTN presenting to clinic for swollen tonsils for several months.  States she has had enlarged tonsils all her life, over the past several months tonsils have further enlarged.  Patient reports difficulty breathing at times.  States about 2 weeks ago she was seen in [facility] ED for worsening of the swelling.  In the ED patient was given two steroid injections and started on PO antibiotics (patient unable to recall the name of medications).  Two days after her ED visit patient reported chest tightness and further difficulty breathing.  She was seen in the ED and given an Epi pen which help alleviate her symptoms.  Reports severe tenderness and pain upon swallowing, movement and touch of her tonsils and neck.  Decreased PO intake 2/2 to nausea and vomiting.  Has not been evaluated by an ENT due to not having a referral from PCP.  Patient also endorses subjective chills, denies any fevers or tonsillar exudates.    Review of Systems   Constitutional: Positive for appetite change. Negative for diaphoresis, fatigue and fever.   HENT: Positive for trouble swallowing. Negative for congestion.         Swollen tonsils   Eyes: Negative for discharge and visual disturbance.   Respiratory: Positive for chest tightness. Negative for cough, shortness of breath and wheezing.    Cardiovascular: Negative for chest pain, palpitations and leg swelling.   Gastrointestinal: Positive for nausea and vomiting. Negative for abdominal distention, abdominal pain, constipation and diarrhea.   Endocrine: Negative for cold intolerance and heat intolerance.   Genitourinary: Negative for dysuria and flank pain.   Musculoskeletal: Negative for arthralgias and myalgias.   Skin: Negative for color change, pallor and rash.   Neurological: Negative for weakness, numbness and headaches.    Hematological: Negative for adenopathy.   Psychiatric/Behavioral: Negative for agitation, behavioral problems and hallucinations. The patient is not nervous/anxious.          Objective:      Vitals:    03/23/17 1430   BP: (!) 139/91   Pulse: 97   Temp: 97.9 °F (36.6 °C)     Physical Exam   Constitutional: She is oriented to person, place, and time. She appears well-developed and well-nourished.   HENT:   Head: Normocephalic and atraumatic.   Pain with ROM of neck  Significantly enlarged tonsils  Uvula midline  Neck tender to soft palpation 2/2 to adenopathy and tonsillar swelling  No tonsillar exudate or pits noted   Eyes: EOM are normal. Pupils are equal, round, and reactive to light.   Neck: Normal range of motion.   Cardiovascular: Normal rate, regular rhythm and normal heart sounds.  Exam reveals no gallop and no friction rub.    No murmur heard.  Pulmonary/Chest: Effort normal and breath sounds normal. No respiratory distress. She has no wheezes.   Abdominal: Soft. Bowel sounds are normal. She exhibits no distension. There is no tenderness.   Mild suprapubic tenderness to deep palpation   Musculoskeletal: Normal range of motion. She exhibits no edema.   Neurological: She is alert and oriented to person, place, and time.   Psychiatric: She has a normal mood and affect. Her behavior is normal. Thought content normal.       Assessment:       1. Essential hypertension    2. Screening mammogram, encounter for    3. Mild intermittent asthma without complication    4. Obstructive tonsil    5. Suprapubic tenderness    6. Nausea        Plan:       Screening mammogram, encounter for  -     Mammo Digital Screening Bilateral With CAD; Future; Expected date: 3/23/17    Mild intermittent asthma without complication  -     albuterol 90 mcg/actuation inhaler; Inhale 2 puffs into the lungs every 6 (six) hours as needed for Wheezing. Rescue  Dispense: 18 g; Refill: 1    Obstructive tonsil  -     Ambulatory referral to ENT  -      CT Soft Tissue Neck WO Contrast; Future; Expected date: 3/23/17  -     Throat swab this visit    Suprapubic tenderness  -     POCT Urinalysis: negative    Nausea        -     Patient nauseated upon throat swab  -     promethazine injection 6.25 mg; Inject 0.25 mLs (6.25 mg total) into the muscle one time.    Return in about 3 weeks (around 4/13/2017).      Patient discussed with staff.    Han Kc MD  \[facility]\"" Family Medicine,  2  3/23/2017  5:18 PM

## 2024-03-25 ENCOUNTER — PATIENT MESSAGE (OUTPATIENT)
Age: 59
End: 2024-03-25

## 2024-04-10 ENCOUNTER — OFFICE VISIT (OUTPATIENT)
Age: 59
End: 2024-04-10
Payer: OTHER GOVERNMENT

## 2024-04-10 ENCOUNTER — ANCILLARY PROCEDURE (OUTPATIENT)
Age: 59
End: 2024-04-10
Payer: OTHER GOVERNMENT

## 2024-04-10 VITALS
BODY MASS INDEX: 19.46 KG/M2 | SYSTOLIC BLOOD PRESSURE: 90 MMHG | HEIGHT: 68 IN | DIASTOLIC BLOOD PRESSURE: 57 MMHG | RESPIRATION RATE: 16 BRPM | HEART RATE: 77 BPM | TEMPERATURE: 98 F | OXYGEN SATURATION: 98 %

## 2024-04-10 DIAGNOSIS — J34.2 DEVIATED NASAL SEPTUM: ICD-10-CM

## 2024-04-10 DIAGNOSIS — R10.9 RIGHT FLANK PAIN: ICD-10-CM

## 2024-04-10 DIAGNOSIS — Q28.2 AVM (ARTERIOVENOUS MALFORMATION) BRAIN: Primary | ICD-10-CM

## 2024-04-10 PROCEDURE — 99213 OFFICE O/P EST LOW 20 MIN: CPT | Performed by: FAMILY MEDICINE

## 2024-04-10 PROCEDURE — PBSHW PBB SHADOW CHARGE: Performed by: FAMILY MEDICINE

## 2024-04-10 PROCEDURE — 74018 RADEX ABDOMEN 1 VIEW: CPT

## 2024-04-10 SDOH — ECONOMIC STABILITY: FOOD INSECURITY: WITHIN THE PAST 12 MONTHS, YOU WORRIED THAT YOUR FOOD WOULD RUN OUT BEFORE YOU GOT MONEY TO BUY MORE.: NEVER TRUE

## 2024-04-10 SDOH — ECONOMIC STABILITY: TRANSPORTATION INSECURITY
IN THE PAST 12 MONTHS, HAS LACK OF TRANSPORTATION KEPT YOU FROM MEETINGS, WORK, OR FROM GETTING THINGS NEEDED FOR DAILY LIVING?: NO

## 2024-04-10 SDOH — ECONOMIC STABILITY: FOOD INSECURITY: WITHIN THE PAST 12 MONTHS, THE FOOD YOU BOUGHT JUST DIDN'T LAST AND YOU DIDN'T HAVE MONEY TO GET MORE.: NEVER TRUE

## 2024-04-10 SDOH — ECONOMIC STABILITY: TRANSPORTATION INSECURITY
IN THE PAST 12 MONTHS, HAS THE LACK OF TRANSPORTATION KEPT YOU FROM MEDICAL APPOINTMENTS OR FROM GETTING MEDICATIONS?: NO

## 2024-04-10 ASSESSMENT — PATIENT HEALTH QUESTIONNAIRE - PHQ9
2. FEELING DOWN, DEPRESSED OR HOPELESS: NOT AT ALL
SUM OF ALL RESPONSES TO PHQ QUESTIONS 1-9: 0
SUM OF ALL RESPONSES TO PHQ QUESTIONS 1-9: 0
1. LITTLE INTEREST OR PLEASURE IN DOING THINGS: NOT AT ALL
SUM OF ALL RESPONSES TO PHQ QUESTIONS 1-9: 0
SUM OF ALL RESPONSES TO PHQ QUESTIONS 1-9: 0
SUM OF ALL RESPONSES TO PHQ9 QUESTIONS 1 & 2: 0

## 2024-04-10 ASSESSMENT — SOCIAL DETERMINANTS OF HEALTH (SDOH): HOW HARD IS IT FOR YOU TO PAY FOR THE VERY BASICS LIKE FOOD, HOUSING, MEDICAL CARE, AND HEATING?: NOT HARD AT ALL

## 2024-04-10 NOTE — PROGRESS NOTES
Marcia Virk  58 y.o. female  1965  [address]  [facility] [address]  [id number]   [facility]: Progress Note  Levar Jean MD       Encounter Date: 4/10/2024    Chief Complaint   Patient presents with    Constipation     History of Present Illness   Marcia Virk is a 58 y.o. female who presents to clinic today for concerns for constipation.  This was previously noted at our encounter on March 18.  Patient notes that she has been going to get colon cleansing locally and is found this to be beneficial.  She has less right-sided abdominal pain and feels as though her bowels have become more normalized.    Review of Systems   Review of Systems    Vitals/Objective:     Vitals:    04/10/24 0937   BP: (!) 90/57   Site: Left Upper Arm   Position: Sitting   Cuff Size: Small Adult   Pulse: 77   Resp: 16   Temp: 98 °F (36.7 °C)   TempSrc: Temporal   SpO2: 98%   Height: 1.727 m (5' 8\")     Body mass index is 19.46 kg/m².    Physical Exam  Constitutional:       Appearance: She is not ill-appearing.   HENT:      Nose:      Comments: Deviated septum  Cardiovascular:      Rate and Rhythm: Normal rate and regular rhythm.   Pulmonary:      Effort: Pulmonary effort is normal.      Breath sounds: Normal breath sounds.   Neurological:      General: No focal deficit present.      Mental Status: She is alert and oriented to person, place, and time. Mental status is at baseline.   Psychiatric:         Mood and Affect: Mood normal.         Behavior: Behavior normal.       Assessment and Plan:   1. Deviated nasal septum  Patient w evidence of a deviated septum.  Discussed that while this is present, surgical intervention may not bee needed.  Patient would like to discuss with ENT risk/benefits of repair before deciding how to move forward. Has previously worked with Dr. Sauer her her facial hemangioma.  Referral placed  - External Referral To ENT    2. AVM (arteriovenous malformation)

## 2024-04-10 NOTE — PROGRESS NOTES
Room 22    Patient is accompanied by self. I have received verbal consent from Marcia Virk to discuss any/all medical information while they are present in the room.    Identified pt with two pt identifiers(name and ). Reviewed record in preparation for visit and have obtained necessary documentation.  Chief Complaint   Patient presents with    Constipation    Here for follow up for xray follow up from constipation issue. Feeling better    Refused weight check today     Health Maintenance Due   Topic    Hepatitis B vaccine (1 of 3 - 3-dose series)    COVID-19 Vaccine (1)    Hepatitis C screen     Shingles vaccine (1 of 2)    DTaP/Tdap/Td vaccine (2 - Td or Tdap)       Vitals:    04/10/24 0937   Resp: 16   Height: 1.727 m (5' 8\")       Social Determinants Of Health:       SDOH screening not required at visit.  Resources Declined.   See AVS for attached resources, if requested.    Coordination of Care Questionnaire:       \"Have you been to the ER, urgent care clinic since your last visit?  Hospitalized since your last visit?\"    NO    “Have you seen or consulted any other health care providers outside of [facility] since your last visit?”    NO            Click Here for Release of Records Request

## 2024-04-22 NOTE — RESULT ENCOUNTER NOTE
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  No evidence of significant constipation.

## 2024-04-23 PROBLEM — Q28.2 AVM (ARTERIOVENOUS MALFORMATION) BRAIN: Chronic | Status: ACTIVE | Noted: 2024-04-23

## 2024-04-23 PROBLEM — Q28.2 AVM (ARTERIOVENOUS MALFORMATION) BRAIN: Status: ACTIVE | Noted: 2024-04-23

## 2024-05-12 ASSESSMENT — SLEEP AND FATIGUE QUESTIONNAIRES
FOSQ SCORE: 19.5
NECK CIRCUMFERENCE (INCHES): 12.5
DO YOU HAVE DIFFICULTY WATCHING A MOVIE OR VIDEO BECAUSE YOU BECOME SLEEPY OR TIRED: NO
WHAT TIME DO YOU USUALLY GO TO BED: 00:30
NUMBER OF TIMES YOU WAKE PER NIGHT: 2
SELECT ANY OF THE FOLLOWING BEHAVIORS OBSERVED WHILE PATIENT ASLEEP: GRINDING TEETH
HOW LIKELY ARE YOU TO NOD OFF OR FALL ASLEEP WHILE SITTING QUIETLY AFTER LUNCH WITHOUT ALCOHOL: SLIGHT CHANCE OF DOZING
HOW LIKELY ARE YOU TO NOD OFF OR FALL ASLEEP WHILE SITTING INACTIVE IN A PUBLIC PLACE: WOULD NEVER DOZE
HOW LIKELY ARE YOU TO NOD OFF OR FALL ASLEEP IN A CAR, WHILE STOPPED FOR A FEW MINUTES IN TRAFFIC: WOULD NEVER DOZE
DO YOU GET TOO LITTLE SLEEP AT NIGHT: YES
ARE YOU BOTHERED BY WAKING UP TOO EARLY AND NOT BEING ABLE TO GET BACK TO SLEEP: NO
HOW LIKELY ARE YOU TO NOD OFF OR FALL ASLEEP WHILE LYING DOWN TO REST IN THE AFTERNOON WHEN CIRCUMSTANCES PERMIT: MODERATE CHANCE OF DOZING
DO YOU HAVE PROBLEMS WITH FREQUENT AWAKENINGS AT NIGHT: NO
DO YOU HAVE DIFFICULTY CONCENTRATING ON THE THINGS YOU DO BECAUSE YOU ARE SLEEPY OR TIRED: NO
DO YOU HAVE DIFFICULTY OPERATING A MOTOR VEHICLE FOR SHORT DISTANCES (LESS THAN 100 MILES) BECAUSE YOU BECOME SLEEPY: NO
DO YOU HAVE DIFFICULTY OPERATING A MOTOR VEHICLE FOR LONG DISTANCES (GREATER THAN 100 MILES) BECAUSE YOU BECOME SLEEPY: NO
DO YOU HAVE DIFFICULTY BEING AS ACTIVE AS YOU WANT TO BE IN THE MORNING BECAUSE YOU ARE SLEEPY OR TIRED: NO
HOW LIKELY ARE YOU TO NOD OFF OR FALL ASLEEP WHILE WATCHING TV: SLIGHT CHANCE OF DOZING
HOW LIKELY ARE YOU TO NOD OFF OR FALL ASLEEP WHILE SITTING AND READING: SLIGHT CHANCE OF DOZING
HOW LIKELY ARE YOU TO NOD OFF OR FALL ASLEEP WHILE SITTING QUIETLY AFTER LUNCH WITHOUT ALCOHOL: SLIGHT CHANCE OF DOZING
ESS TOTAL SCORE: 5
DO YOU HAVE DIFFICULTY VISITING YOUR FAMILY OR FRIENDS IN THEIR HOME BECAUSE YOU BECOME SLEEPY OR TIRED: NO
DO YOU TAKE NAPS: NO
AVERAGE NUMBER OF SLEEP HOURS: 6
HOW LIKELY ARE YOU TO NOD OFF OR FALL ASLEEP WHILE SITTING AND TALKING TO SOMEONE: WOULD NEVER DOZE
HOW LIKELY ARE YOU TO NOD OFF OR FALL ASLEEP WHILE SITTING INACTIVE IN A PUBLIC PLACE: WOULD NEVER DOZE
SELECT ANY OF THE FOLLOWING BEHAVIORS OBSERVED WHILE YOU ARE ASLEEP: GRINDING TEETH
DO YOU GET TOO LITTLE SLEEP AT NIGHT: YES
AVERAGE NUMBER OF SLEEP HOURS: 6
HOW LIKELY ARE YOU TO NOD OFF OR FALL ASLEEP WHILE SITTING AND READING: SLIGHT CHANCE OF DOZING
DO YOU HAVE DIFFICULTY BEING AS ACTIVE AS YOU WANT TO BE IN THE EVENING BECAUSE YOU ARE SLEEPY OR TIRED: YES, LITTLE
HOW LIKELY ARE YOU TO NOD OFF OR FALL ASLEEP WHEN YOU ARE A PASSENGER IN A CAR FOR AN HOUR WITHOUT A BREAK: WOULD NEVER DOZE
HOW LIKELY ARE YOU TO NOD OFF OR FALL ASLEEP IN A CAR, WHILE STOPPED FOR A FEW MINUTES IN TRAFFIC: WOULD NEVER DOZE
HOW LIKELY ARE YOU TO NOD OFF OR FALL ASLEEP WHILE WATCHING TV: SLIGHT CHANCE OF DOZING
HOW LIKELY ARE YOU TO NOD OFF OR FALL ASLEEP WHEN YOU ARE A PASSENGER IN A CAR FOR AN HOUR WITHOUT A BREAK: WOULD NEVER DOZE
HOW LIKELY ARE YOU TO NOD OFF OR FALL ASLEEP WHILE LYING DOWN TO REST IN THE AFTERNOON WHEN CIRCUMSTANCES PERMIT: MODERATE CHANCE OF DOZING
HAS YOUR RELATIONSHIP WITH FAMILY, FRIENDS OR WORK COLLEAGUES BEEN AFFECTED BECAUSE YOU ARE SLEEPY OR TIRED: NO
HAS YOUR MOOD BEEN AFFECTED BECAUSE YOU ARE SLEEPY OR TIRED: NO
DO YOU GENERALLY HAVE DIFFICULTY REMEMBERING THINGS BECAUSE YOU ARE SLEEPY OR TIRED: NO
ARE YOU BOTHERED BY WAKING UP TOO EARLY AND NOT BEING ABLE TO GET BACK TO SLEEP: NO
DO YOU WORK SHIFTS: NO
HOW LIKELY ARE YOU TO NOD OFF OR FALL ASLEEP WHILE SITTING AND TALKING TO SOMEONE: WOULD NEVER DOZE

## 2024-05-14 ENCOUNTER — OFFICE VISIT (OUTPATIENT)
Age: 59
End: 2024-05-14
Payer: OTHER GOVERNMENT

## 2024-05-14 VITALS
OXYGEN SATURATION: 99 % | SYSTOLIC BLOOD PRESSURE: 89 MMHG | HEIGHT: 68 IN | DIASTOLIC BLOOD PRESSURE: 64 MMHG | BODY MASS INDEX: 18.43 KG/M2 | HEART RATE: 71 BPM | WEIGHT: 121.6 LBS

## 2024-05-14 DIAGNOSIS — G47.33 OSA (OBSTRUCTIVE SLEEP APNEA): Primary | ICD-10-CM

## 2024-05-14 PROCEDURE — 99203 OFFICE O/P NEW LOW 30 MIN: CPT | Performed by: SPECIALIST

## 2024-05-14 NOTE — PROGRESS NOTES
[facility]  [facility]ORD[facility]  [address] [facility] [address]  Dept: [phone]           [address]  [place] [address]  Tel.  [phone]  Fax. [phone] [address]  [place] [address]  Tel.  [phone]  Fax. [phone] [address] [address]  Tel.  [phone]  Fax. [phone]       Chief Complaint       Chief Complaint   Patient presents with    Sleep Problem     NP refd by Levar Jean MD for sleep disturbances        HPI      Marcia Virk is 58 y.o. female seen for evaluation of a sleep disorder.     The patient reports she has experienced episodes of shortness of breath which \"comes and goes\" during the past 20 years.    She notes a history of bruxism.  Retires between midnight-2 AM and is out of bed between 7: 30-9 AM.  Does not report significant snoring.    Had recently been evaluated by Dr. Mark.  He classified Mallampati as IV.    She is concerned about potential sleep disordered breathing.  Reports apparent bruxism.    She does not report excessive daytime sleepiness.  Brady Sleepiness Scale: 5..      The patient has not undergone diagnostic testing for the current problems.             5/12/2024    11:16 PM   Sleep Medicine   Sitting and reading 1   Watching TV 1   Sitting, inactive in a public place (e.g. a theatre or a meeting) 0   As a passenger in a car for an hour without a break 0   Lying down to rest in the afternoon when circumstances permit 2   Sitting and talking to someone 0   Sitting quietly after a lunch without alcohol 1   In a car, while stopped for a few minutes in traffic 0   Brady Sleepiness Score 5   Neck circumference (Inches) 12.5        Allergies   Allergen Reactions    Penicillins Swelling     Tolerated keflex           Current Outpatient Medications:     tretinoin (RETIN-A) 0.05 % cream, APPLY TO AFFECTED AREA(S) AS

## 2024-05-30 ENCOUNTER — OFFICE VISIT (OUTPATIENT)
Age: 59
End: 2024-05-30
Payer: OTHER GOVERNMENT

## 2024-05-30 VITALS
DIASTOLIC BLOOD PRESSURE: 69 MMHG | TEMPERATURE: 98.2 F | HEART RATE: 75 BPM | SYSTOLIC BLOOD PRESSURE: 103 MMHG | BODY MASS INDEX: 18.49 KG/M2 | HEIGHT: 68 IN | RESPIRATION RATE: 18 BRPM | OXYGEN SATURATION: 98 %

## 2024-05-30 DIAGNOSIS — Q28.2 AVM (ARTERIOVENOUS MALFORMATION) BRAIN: Primary | Chronic | ICD-10-CM

## 2024-05-30 PROCEDURE — 99213 OFFICE O/P EST LOW 20 MIN: CPT | Performed by: FAMILY MEDICINE

## 2024-05-30 RX ORDER — TRETINOIN 0.5 MG/G
CREAM TOPICAL
Qty: 45 G | Refills: 3 | Status: SHIPPED | OUTPATIENT
Start: 2024-05-30

## 2024-05-30 ASSESSMENT — PATIENT HEALTH QUESTIONNAIRE - PHQ9
2. FEELING DOWN, DEPRESSED OR HOPELESS: NOT AT ALL
SUM OF ALL RESPONSES TO PHQ QUESTIONS 1-9: 0
SUM OF ALL RESPONSES TO PHQ9 QUESTIONS 1 & 2: 0
SUM OF ALL RESPONSES TO PHQ QUESTIONS 1-9: 0
1. LITTLE INTEREST OR PLEASURE IN DOING THINGS: NOT AT ALL

## 2024-05-30 NOTE — PROGRESS NOTES
Marcia Virk  58 y.o. female  : 1965  [address]  [facility] [address]  [id number]   [facility]: Progress Note  Levar Jean MD       Encounter Date: 2024    Chief Complaint   Patient presents with    Follow-up Chronic Condition     Referral needed for Dr. Bell.         Subjective   History of Present Illness  The patient presents for evaluation of multiple medical concerns.    Dr. Espitia expressed concern over the patient's lack of suitable treatment options. The patient was referred to [facility] by Dr. Talley and Dr. Santiago, both of whom performed the Gamma Knife procedure. 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     Continues to have jaw issues and is following with Dr. Mark.   Although the patient denies experiencing clicking or popping in the jaw, occasionally, the patient experiences headaches.    The patient is scheduled for a sleep study due to suspicions of sleep apnea,    Review of Systems       Objective   Blood pressure 103/69, pulse 75, temperature 98.2 °F (36.8 °C), temperature source Temporal, resp. rate 18, height 1.727 m (5' 8\"), SpO2 98 %.  Physical Exam  Constitutional:       Appearance: She is not ill-appearing.   Cardiovascular:      Rate and Rhythm: Normal rate and regular rhythm.   Pulmonary:      Effort: Pulmonary effort is normal.      Breath sounds: Normal breath sounds.   Neurological:      General: No focal deficit present.      Mental Status: She is alert and oriented to person, place, and time. Mental status is at baseline.   Psychiatric:         Mood and Affect: Mood normal.         Behavior: Behavior normal.         Assessment & Plan  1. Arteriovenous malformation (AVMs).  A referral will be made for the patient to consult with Dr. Bell and Dr. Santiago, who specializes in Gamma Knife procedure to discuss potential options, including non-procedural management.

## 2024-05-30 NOTE — PROGRESS NOTES
Chief Complaint   Patient presents with    Follow-up Chronic Condition     Referral needed for Dr. Bell.      Vitals:    05/30/24 1107   BP: 103/69   Site: Right Upper Arm   Position: Sitting   Cuff Size: Medium Adult   Pulse: 75   Resp: 18   Temp: 98.2 °F (36.8 °C)   TempSrc: Temporal   SpO2: 98%   Height: 1.727 m (5' 8\")     \"Have you been to the ER, urgent care clinic since your last visit?  Hospitalized since your last visit?\"    NO    “Have you seen or consulted any other health care providers outside of [facility] since your last visit?”    NO     “Have you had a pap smear?”    NO    Date of last Cervical Cancer screen (HPV or PAP): 5/28/2019             Click Here for Release of Records Request

## 2024-06-19 ENCOUNTER — PROCEDURE VISIT (OUTPATIENT)
Age: 59
End: 2024-06-19

## 2024-06-19 DIAGNOSIS — G47.33 OSA (OBSTRUCTIVE SLEEP APNEA): Primary | ICD-10-CM

## 2024-06-20 ENCOUNTER — PROCEDURE VISIT (OUTPATIENT)
Age: 59
End: 2024-06-20

## 2024-06-20 ENCOUNTER — HOSPITAL ENCOUNTER (OUTPATIENT)
Facility: HOSPITAL | Age: 59
Discharge: HOME | End: 2024-06-23
Payer: OTHER GOVERNMENT

## 2024-06-20 DIAGNOSIS — G47.33 OSA (OBSTRUCTIVE SLEEP APNEA): ICD-10-CM

## 2024-06-20 DIAGNOSIS — G47.33 OSA (OBSTRUCTIVE SLEEP APNEA): Primary | ICD-10-CM

## 2024-06-20 PROCEDURE — 95800 SLP STDY UNATTENDED: CPT | Performed by: SPECIALIST

## 2024-06-20 NOTE — PROGRESS NOTES
Patient returned the WatchPAT home sleep apnea test (HSAT) device intact and without evident damage. Patient will receive the results of the test within 10-15 business days.

## 2024-06-30 ENCOUNTER — CLINICAL DOCUMENTATION (OUTPATIENT)
Age: 59
End: 2024-06-30

## 2024-07-06 PROBLEM — M41.9 SCOLIOSIS OF THORACOLUMBAR SPINE: Chronic | Status: ACTIVE | Noted: 2024-03-18

## 2024-07-06 PROBLEM — E78.9 LIPID DISORDER: Chronic | Status: ACTIVE | Noted: 2017-01-04

## 2024-08-25 SDOH — ECONOMIC STABILITY: FOOD INSECURITY: WITHIN THE PAST 12 MONTHS, THE FOOD YOU BOUGHT JUST DIDN'T LAST AND YOU DIDN'T HAVE MONEY TO GET MORE.: PATIENT DECLINED

## 2024-08-25 SDOH — ECONOMIC STABILITY: FOOD INSECURITY: WITHIN THE PAST 12 MONTHS, YOU WORRIED THAT YOUR FOOD WOULD RUN OUT BEFORE YOU GOT MONEY TO BUY MORE.: PATIENT DECLINED

## 2024-08-25 SDOH — ECONOMIC STABILITY: TRANSPORTATION INSECURITY
IN THE PAST 12 MONTHS, HAS LACK OF TRANSPORTATION KEPT YOU FROM MEETINGS, WORK, OR FROM GETTING THINGS NEEDED FOR DAILY LIVING?: PATIENT DECLINED

## 2024-08-25 SDOH — ECONOMIC STABILITY: INCOME INSECURITY: HOW HARD IS IT FOR YOU TO PAY FOR THE VERY BASICS LIKE FOOD, HOUSING, MEDICAL CARE, AND HEATING?: PATIENT DECLINED

## 2024-08-26 ENCOUNTER — TELEMEDICINE (OUTPATIENT)
Age: 59
End: 2024-08-26
Payer: OTHER GOVERNMENT

## 2024-08-26 DIAGNOSIS — Z80.0 FAMILY HISTORY OF STOMACH CANCER: ICD-10-CM

## 2024-08-26 DIAGNOSIS — K21.9 GASTROESOPHAGEAL REFLUX DISEASE, UNSPECIFIED WHETHER ESOPHAGITIS PRESENT: Primary | Chronic | ICD-10-CM

## 2024-08-26 DIAGNOSIS — K31.A0 INTESTINAL METAPLASIA OF STOMACH: ICD-10-CM

## 2024-08-26 DIAGNOSIS — K44.9 HIATAL HERNIA: ICD-10-CM

## 2024-08-26 PROCEDURE — 99212 OFFICE O/P EST SF 10 MIN: CPT | Performed by: FAMILY MEDICINE

## 2024-08-26 NOTE — PROGRESS NOTES
Marcia Virk  58 y.o. female  1965  [address] [facility] [address] [facility] [address]  [id number]   [facility]:    Telemedicine Progress Note  Levar Jean MD       Encounter Date and Time: 2024 at 12:50 PM    Marcia Virk, was evaluated through a synchronous (real-time) audio-video encounter. The patient (or guardian if applicable) is aware that this is a billable service, which includes applicable co-pays. This Virtual Visit was conducted with patient's (and/or legal guardian's) consent. Patient identification was verified, and a caregiver was present when appropriate.   The patient was located at Home: [address] [place] [id number]  Provider was located at Facility (Appt Dept): [address] [place] [phone]  Confirm you are appropriately licensed, registered, or certified to deliver care in the state where the patient is located as indicated above. If you are not or unsure, please re-schedule the visit: Yes, I confirm.       Chief Complaint   Patient presents with    Follow-up     History of Present Illness   Marcia Virk is a 58 y.o. female was evaluated by synchronous (real-time) audio-video technology from home, through a secure patient portal.    Still \"dealing\" with Dr. Yee.  Hasn't been to see Dr. Sauer yet. Feels she has nasal polyps  Still seeing Dr. Sotelo.    Sleep apnea test was normal.   Dr. Burrows called her, wants to do an EGD.  Father  of stomach cancer.  She has hx of H.Pylori, and hx of intestinal metaplasia.  Needs a referral, already has an appointment 10/8 @ 10Am for EGD.  Sarah saw her for AVM.  W/o symptoms they are recommending watchful waiting.     Vitals/Objective:         2024     9:50 PM   Patient-Reported Vitals   Patient-Reported Weight 125   Patient-Reported Height 5'8\"      General: alert, cooperative, and no distress   Mental  status: mental status: alert, oriented to person, place,

## 2024-10-08 ENCOUNTER — ANESTHESIA EVENT (OUTPATIENT)
Facility: HOSPITAL | Age: 59
End: 2024-10-08
Payer: OTHER GOVERNMENT

## 2024-10-08 ENCOUNTER — HOSPITAL ENCOUNTER (OUTPATIENT)
Facility: HOSPITAL | Age: 59
Setting detail: OUTPATIENT SURGERY
Discharge: HOME | End: 2024-10-08
Attending: SPECIALIST | Admitting: SPECIALIST
Payer: OTHER GOVERNMENT

## 2024-10-08 ENCOUNTER — ANESTHESIA (OUTPATIENT)
Facility: HOSPITAL | Age: 59
End: 2024-10-08
Payer: OTHER GOVERNMENT

## 2024-10-08 VITALS
HEIGHT: 68 IN | BODY MASS INDEX: 18.48 KG/M2 | RESPIRATION RATE: 18 BRPM | DIASTOLIC BLOOD PRESSURE: 65 MMHG | HEART RATE: 68 BPM | WEIGHT: 121.91 LBS | SYSTOLIC BLOOD PRESSURE: 97 MMHG | TEMPERATURE: 97.9 F | OXYGEN SATURATION: 100 %

## 2024-10-08 PROCEDURE — 6360000002 HC RX W HCPCS

## 2024-10-08 PROCEDURE — 2709999900 HC NON-CHARGEABLE SUPPLY: Performed by: SPECIALIST

## 2024-10-08 PROCEDURE — 3600007502: Performed by: SPECIALIST

## 2024-10-08 PROCEDURE — 7100000010 HC PHASE II RECOVERY - FIRST 15 MIN: Performed by: SPECIALIST

## 2024-10-08 PROCEDURE — 3700000000 HC ANESTHESIA ATTENDED CARE: Performed by: SPECIALIST

## 2024-10-08 PROCEDURE — 7100000011 HC PHASE II RECOVERY - ADDTL 15 MIN: Performed by: SPECIALIST

## 2024-10-08 PROCEDURE — 88342 IMHCHEM/IMCYTCHM 1ST ANTB: CPT

## 2024-10-08 PROCEDURE — 2500000003 HC RX 250 WO HCPCS

## 2024-10-08 PROCEDURE — 88305 TISSUE EXAM BY PATHOLOGIST: CPT

## 2024-10-08 RX ORDER — SODIUM CHLORIDE 0.9 % (FLUSH) 0.9 %
5-40 SYRINGE (ML) INJECTION PRN
Status: DISCONTINUED | OUTPATIENT
Start: 2024-10-08 | End: 2024-10-08 | Stop reason: HOSPADM

## 2024-10-08 RX ORDER — SODIUM CHLORIDE 9 MG/ML
INJECTION, SOLUTION INTRAVENOUS CONTINUOUS
Status: DISCONTINUED | OUTPATIENT
Start: 2024-10-08 | End: 2024-10-08 | Stop reason: HOSPADM

## 2024-10-08 RX ORDER — DEXMEDETOMIDINE HYDROCHLORIDE 100 UG/ML
INJECTION, SOLUTION INTRAVENOUS
Status: DISCONTINUED | OUTPATIENT
Start: 2024-10-08 | End: 2024-10-08 | Stop reason: SDUPTHER

## 2024-10-08 RX ORDER — PROPOFOL 10 MG/ML
INJECTION, EMULSION INTRAVENOUS
Status: DISCONTINUED | OUTPATIENT
Start: 2024-10-08 | End: 2024-10-08 | Stop reason: SDUPTHER

## 2024-10-08 RX ADMIN — PROPOFOL 50 MG: 10 INJECTION, EMULSION INTRAVENOUS at 10:18

## 2024-10-08 RX ADMIN — LIDOCAINE HYDROCHLORIDE 100 MG: 20 INJECTION, SOLUTION INFILTRATION; PERINEURAL at 10:12

## 2024-10-08 RX ADMIN — DEXMEDETOMIDINE 4 MCG: 100 INJECTION, SOLUTION INTRAVENOUS at 10:09

## 2024-10-08 RX ADMIN — PROPOFOL 50 MG: 10 INJECTION, EMULSION INTRAVENOUS at 10:12

## 2024-10-08 RX ADMIN — PROPOFOL 50 MG: 10 INJECTION, EMULSION INTRAVENOUS at 10:15

## 2024-10-08 ASSESSMENT — PAIN - FUNCTIONAL ASSESSMENT: PAIN_FUNCTIONAL_ASSESSMENT: 0-10

## 2024-10-08 NOTE — OP NOTE
[facility]  [facility]  Amaury Burrows MD  [phone]      2024    Esophagogastroduodenoscopy (EGD) Procedure Note  Marcia Virk  : 1965  [facility] Medical Record Number: [id number]      Indications:   Family history stomach cancer father, personal history h pylori (negative results after treatment), personal history of gastric intestinal metaplasia.    For surveillance Rose protocol biopsies and gastric mucosal exam.    Referring Physician:  Levar Jean MD  Anesthesia/Sedation:  Conscious sedation/deep sedation/monitored anesthesia -- see notes.  Endoscopist:  Dr. Amaury Burrows  Complications:  None  Estimated Blood Loss:  None    Permit:  The indications, risks, benefits and alternatives were reviewed with the patient or their decision maker who was provided an opportunity to ask questions and all questions were answered.  The specific risks of esophagogastroduodenoscopy with conscious sedation were reviewed, including but not limited to anesthetic complication, bleeding, adverse drug reaction, missed lesion, infection, IV site reactions, and intestinal perforation which would lead to the need for surgical repair.  Alternatives to EGD including radiographic imaging, observation without testing, or laboratory testing were reviewed as well as the limitations of those alternatives discussed.  After considering the options and having all their questions answered, the patient or their decision maker provided both verbal and written consent to proceed.       Procedure in Detail:  After obtaining informed consent, positioning of the patient in the left lateral decubitus position, and conduction of a pre-procedure pause or \"time out\" the endoscope was introduced into the mouth and advanced to the duodenum.  A careful inspection was made, and findings or interventions are described below.    Findings:

## 2024-10-08 NOTE — PERIOP NOTE
Initial RN admission and assessment performed and documented in Endoscopy navigator.     Patient evaluated by anesthesia in pre-procedure holding.     All procedural vital signs, airway assessment, and level of consciousness information monitored and recorded by anesthesia staff on the anesthesia record.     Report received from CRNA post procedure.  Patient transported to recovery area by RN.    Endoscopy post procedure time out was performed and specimens were verified with physician.    Endoscope was pre-cleaned at bedside immediately following procedure by Mi De La Torre.

## 2024-10-08 NOTE — ANESTHESIA PRE PROCEDURE
Laterality Date   •  SECTION  2006   • CHOLECYSTECTOMY  3/2016   • CHOLECYSTECTOMY Right    • COLONOSCOPY N/A 6/10/2022    COLONOSCOPY performed by Amaury Burrows MD at [facility] ENDOSCOPY   • EMBOLIZATION  2023   • GYN  2006    c-sec   • HEENT  1970    tonsilectomy   • HERNIA REPAIR     • IR VASC EMBOLIZE OCCLUDE ARTERY  2023    IR VASC EMBOLIZE OCCLUDE ARTERY   • IR VASC EMBOLIZE OCCLUDE ARTERY  2023    IR VASC EMBOLIZE OCCLUDE ARTERY   • IR VASC EMBOLIZE OCCLUDE ARTERY  2023    IR VASC EMBOLIZE OCCLUDE ARTERY   • IR VASC EMBOLIZE OCCLUDE ARTERY  2023    IR VASC EMBOLIZE OCCLUDE ARTERY   • OTHER SURGICAL HISTORY  3/8/16    single site laparoscopic cholecystectomy   • [place] UNLISTED PROCEDURE ABDOMEN PERITONEUM & OMENTUM     • TONSILLECTOMY  1970   • UPPER GASTROINTESTINAL ENDOSCOPY         Social History:    Social History     Tobacco Use   • Smoking status: Never     Passive exposure: Never   • Smokeless tobacco: Never   Substance Use Topics   • Alcohol use: No                                Counseling given: Not Answered      Vital Signs (Current):   Vitals:    10/08/24 0921   BP: 97/68   Pulse: 72   Resp: 18   Temp: 98.4 °F (36.9 °C)   TempSrc: Oral   SpO2: 100%   Weight: 55.3 kg (121 lb 14.6 oz)   Height: 1.727 m (5' 8\")                                              BP Readings from Last 3 Encounters:   10/08/24 97/68   24 103/69   24 (!) 89/64       NPO Status: Time of last liquid consumption:                         Time of last solid consumption:                         Date of last liquid consumption: 10/07/24                        Date of last solid food consumption: 10/07/24    BMI:   Wt Readings from Last 3 Encounters:   10/08/24 55.3 kg (121 lb 14.6 oz)   24 55.2 kg (121 lb 9.6 oz)   24 58.1 kg (128 lb)     Body mass index is 18.54 kg/m².    CBC:   Lab Results   Component Value Date/Time    WBC 3.2 2023 05:21 PM    RBC 4.59

## 2024-10-08 NOTE — H&P
59 y.o. female for open access EGD for surveillance of gastric intestinal metaplasia, limited to antrum, focal nature noted on histology, no helicobacter on most recent biopsies but history of same treated.    Last EGD 2 years ago.      Additional data for completion of the targeted pre-endoscopy H&P will be provided under 'H&P interval notes'.  Please see that document which will be attached to this.  Amaury Burrows MD  
Pre-Endoscopy H&P Update  Chief complaint/HPI/ROS:  The indication for the procedure, the patient's history and the patient's current medications are reviewed prior to the procedure and that data is reported on the H&P to which this document is attached.  Any significant complaints with regard to organ systems will be noted, and if not mentioned then a review of systems is not contributory.  Past Medical History:   Diagnosis Date    Acid indigestion     Aneurysm (HCC) 2022    Esophagitis     Fast heart beat     Gallbladder attack     Gilbert syndrome     Hypercholesterolemia     Stomach pain       Past Surgical History:   Procedure Laterality Date     SECTION  ,     CHOLECYSTECTOMY  3/2016    CHOLECYSTECTOMY Right     COLONOSCOPY N/A 6/10/2022    COLONOSCOPY performed by Amaury Burrows MD at [facility] ENDOSCOPY    EMBOLIZATION  2023    GYN      c-sec    HEENT  1970    tonsilectomy    HERNIA REPAIR      IR VASC EMBOLIZE OCCLUDE ARTERY  2023    IR VASC EMBOLIZE OCCLUDE ARTERY    IR VASC EMBOLIZE OCCLUDE ARTERY  2023    IR VASC EMBOLIZE OCCLUDE ARTERY    IR VASC EMBOLIZE OCCLUDE ARTERY  2023    IR VASC EMBOLIZE OCCLUDE ARTERY    IR VASC EMBOLIZE OCCLUDE ARTERY  2023    IR VASC EMBOLIZE OCCLUDE ARTERY    OTHER SURGICAL HISTORY  3/8/16    single site laparoscopic cholecystectomy    FL UNLISTED PROCEDURE ABDOMEN PERITONEUM & OMENTUM      TONSILLECTOMY  1970    UPPER GASTROINTESTINAL ENDOSCOPY       Social   Social History     Tobacco Use    Smoking status: Never     Passive exposure: Never    Smokeless tobacco: Never   Substance Use Topics    Alcohol use: No      Family History   Problem Relation Age of Onset    Stomach Cancer Father     Cancer Father     Ovarian Cancer Maternal Aunt 60      Allergies   Allergen Reactions    Penicillins Swelling     Tolerated keflex        Prior to Admission Medications   Prescriptions Last Dose Informant Patient Reported? Taking?   Turmeric 
2020

## 2024-10-08 NOTE — DISCHARGE INSTRUCTIONS
[facility]  [facility]  Amaury Porter MD  [phone]      October 8, 2024     Marcia Virk  YOB: 1965    ENDOSCOPY DISCHARGE INSTRUCTIONS    If there is redness at IV site you should apply warm compress to area.  If redness or soreness persist contact Dr. Porter' or your primary care doctor.    Gaseous discomfort may develop, but walking, belching will help relieve this.  You may not operate a vehicle for 12 hours  You may not operate machinery or dangerous appliances for rest of today  You may not drink alcoholic beverages for 12 hours  Avoid making any critical decisions for 24 hours    DIET:  You may resume your normal diet, but some patients find that heavy or large meals may lead to indigestion or vomiting.  I suggest a light meal as first food intake.    MEDICATIONS:  The use of some over-the-counter pain medication may lead to bleeding after biopsies or other procedures you may have had done.  Tylenol (also called acetaminophen) is safe to take and will not lead to bleeding.  Based on the procedure you had today you may not safely take aspirin or aspirin-like products for the next ten (10) days.      ACTIVITY:  You may resume your normal household activities, but it is recommended that you spend the remainder of the day resting -  avoid any strenuous activity.    CALL DR. PORTER' OFFICE IF:  Increasing pain, nausea, vomiting  Abdominal distension (swelling)  Significant new or increased bleeding (oral or rectal)  Fever/Chills  Chest pain/shortness of breath                   Additional instructions:   Great news no significant visible abnormalities but we did obtain biopsies to evaluate the stomach under the microscope.  Will contact with the biopsy results in 10-14 days.     It was an honor to be your doctor today.  Please let me or my office staff know if you have any feedback about today's procedure.

## 2024-10-10 NOTE — ANESTHESIA POSTPROCEDURE EVALUATION
Department of Anesthesiology  Postprocedure Note    Patient: Marcia Virk  MRN: [id number]  YOB: 1965  Date of evaluation: 10/10/2024    Procedure Summary       Date: 10/08/24 Room / Location: [facility] ENDO 03 / [facility] ENDOSCOPY    Anesthesia Start: 1007 Anesthesia Stop: 1021    Procedures:       ESOPHAGOGASTRODUODENOSCOPY (Upper GI Region)      ESOPHAGOGASTRODUODENOSCOPY BIOPSY (Upper GI Region) Diagnosis:       Gastroesophagitis      (Gastroesophagitis [K29.70, K20.90])    Surgeons: Amaury Burrows MD Responsible Provider: Charla Mendoza MD    Anesthesia Type: MAC ASA Status: 2            Anesthesia Type: MAC    Jamee Phase I: Jamee Score: 10    Jamee Phase II: Jamee Score: 10    Anesthesia Post Evaluation    Patient location during evaluation: PACU  Patient participation: complete - patient participated  Level of consciousness: awake  Airway patency: patent  Nausea & Vomiting: no vomiting and no nausea  Cardiovascular status: hemodynamically stable  Respiratory status: acceptable  Hydration status: stable  Pain management: adequate    No notable events documented.

## 2024-11-20 ENCOUNTER — PATIENT MESSAGE (OUTPATIENT)
Age: 59
End: 2024-11-20

## 2024-11-20 DIAGNOSIS — M26.639 ARTICULAR DISC DISORDER OF TEMPOROMANDIBULAR JOINT: ICD-10-CM

## 2024-11-20 DIAGNOSIS — G47.9 SLEEP DISTURBANCES: Primary | ICD-10-CM

## 2024-11-20 DIAGNOSIS — M26.629 ARTHRALGIA OF TEMPOROMANDIBULAR JOINT, UNSPECIFIED LATERALITY: ICD-10-CM

## 2024-11-21 ENCOUNTER — OFFICE VISIT (OUTPATIENT)
Age: 59
End: 2024-11-21

## 2024-11-21 VITALS
HEIGHT: 68 IN | DIASTOLIC BLOOD PRESSURE: 58 MMHG | HEART RATE: 78 BPM | SYSTOLIC BLOOD PRESSURE: 91 MMHG | BODY MASS INDEX: 18.54 KG/M2 | RESPIRATION RATE: 18 BRPM | TEMPERATURE: 98 F | OXYGEN SATURATION: 98 %

## 2024-11-21 DIAGNOSIS — L03.011 ACUTE PARONYCHIA OF FINGER, RIGHT: Primary | ICD-10-CM

## 2024-11-21 RX ORDER — MUPIROCIN 20 MG/G
OINTMENT TOPICAL
Qty: 15 G | Refills: 0 | Status: SHIPPED | OUTPATIENT
Start: 2024-11-21 | End: 2024-11-28

## 2024-11-21 ASSESSMENT — ENCOUNTER SYMPTOMS: COLOR CHANGE: 1

## 2024-11-21 NOTE — PATIENT INSTRUCTIONS
Prior to applying antibiotic, soak hand in warm water for 10-15 minutes.   Avoid pulling at the skin or cuticle around the nail.

## 2024-11-22 NOTE — PROGRESS NOTES
I saw and evaluated the patient, performing the key elements of the service.  I discussed the findings, assessment and plan with the resident and agree with the resident's findings and plan as documented in the resident's note.   
Marcia Virk is a 59 y.o. female      Chief Complaint   Patient presents with    Swelling     Patient had a cuticle that she took off on her middle finger on right hand. She thinks it might be infected. It is inflamed and sensitive to touch. his has been going on for 3 days. No other concerns.       \"Have you been to the ER, urgent care clinic since your last visit?  Hospitalized since your last visit?\"    NO    “Have you seen or consulted any other health care providers outside of [facility] since your last visit?”    NO         Vitals:    11/21/24 1801   BP: (!) 91/58   Site: Right Upper Arm   Position: Sitting   Pulse: 78   Resp: 18   Temp: 98 °F (36.7 °C)   TempSrc: Oral   SpO2: 98%   Height: 1.727 m (5' 8\")            Health Maintenance Due   Topic Date Due    Hepatitis C screen  Never done    Hepatitis B vaccine (1 of 3 - 19+ 3-dose series) Never done    Shingles vaccine (1 of 2) Never done    DTaP/Tdap/Td vaccine (2 - Td or Tdap) 04/25/2021    Lipids  05/28/2024    Cervical cancer screen  05/28/2024    Flu vaccine (1) 08/01/2024    COVID-19 Vaccine (1 - 2023-24 season) Never done         Medication Reconciliation completed, changes noted.  Please  Update medication list.    
patient medical and social history and medications.  I have reviewed pertinent labs results and other data. I have discussed the diagnosis with the patient and the intended plan as seen in the above orders. The patient has received an after-visit summary and questions were answered concerning future plans. I have discussed medication side effects and warnings with the patient as well.    Jessenia Posey MD  Resident, [facility]  11/21/24

## 2025-01-21 ENCOUNTER — OFFICE VISIT (OUTPATIENT)
Age: 60
End: 2025-01-21
Payer: OTHER GOVERNMENT

## 2025-01-21 VITALS
DIASTOLIC BLOOD PRESSURE: 68 MMHG | BODY MASS INDEX: 18.54 KG/M2 | HEIGHT: 68 IN | RESPIRATION RATE: 18 BRPM | HEART RATE: 71 BPM | TEMPERATURE: 98.7 F | SYSTOLIC BLOOD PRESSURE: 109 MMHG | OXYGEN SATURATION: 97 %

## 2025-01-21 DIAGNOSIS — Z00.00 ENCOUNTER FOR WELL ADULT EXAM WITHOUT ABNORMAL FINDINGS: Primary | ICD-10-CM

## 2025-01-21 PROCEDURE — 99396 PREV VISIT EST AGE 40-64: CPT | Performed by: FAMILY MEDICINE

## 2025-01-21 ASSESSMENT — PATIENT HEALTH QUESTIONNAIRE - PHQ9
SUM OF ALL RESPONSES TO PHQ QUESTIONS 1-9: 0
SUM OF ALL RESPONSES TO PHQ QUESTIONS 1-9: 0
1. LITTLE INTEREST OR PLEASURE IN DOING THINGS: NOT AT ALL
SUM OF ALL RESPONSES TO PHQ9 QUESTIONS 1 & 2: 0
SUM OF ALL RESPONSES TO PHQ QUESTIONS 1-9: 0
2. FEELING DOWN, DEPRESSED OR HOPELESS: NOT AT ALL
SUM OF ALL RESPONSES TO PHQ QUESTIONS 1-9: 0

## 2025-01-21 NOTE — PROGRESS NOTES
Chief Complaint   Patient presents with    Annual Exam     Vitals:    01/21/25 1556   BP: 109/68   Site: Right Upper Arm   Position: Sitting   Cuff Size: Medium Adult   Pulse: 71   Resp: 18   Temp: 98.7 °F (37.1 °C)   TempSrc: Temporal   SpO2: 97%   Height: 1.727 m (5' 8\")     \"Have you been to the ER, urgent care clinic since your last visit?  Hospitalized since your last visit?\"    NO    “Have you seen or consulted any other health care providers outside of [facility] since your last visit?”    NO    Have you had a mammogram?”   NO    Date of last Mammogram: 1/11/2023      “Have you had a pap smear?”    NO    Date of last Cervical Cancer screen (HPV or PAP): 5/28/2019             Click Here for Release of Records Request  
sounds.   Pulmonary:      Effort: Pulmonary effort is normal.      Breath sounds: Normal breath sounds.   Abdominal:      General: Bowel sounds are normal.      Palpations: Abdomen is soft.   Musculoskeletal:         General: Normal range of motion.   Lymphadenopathy:      Cervical: No cervical adenopathy.   Skin:     General: Skin is warm.      Capillary Refill: Capillary refill takes less than 2 seconds.   Neurological:      General: No focal deficit present.      Mental Status: She is alert and oriented to person, place, and time.   Psychiatric:         Mood and Affect: Mood normal.         Behavior: Behavior normal.             Assessment & Plan   Encounter for well adult exam without abnormal findings  Marcia Virk was counseled on age-appropriate/ guideline-based risk prevention behaviors and screening for a 59 y.o. year old   female .  We also discussed adjustments in screening based on family history if necessary.   Printed instructions for preventative screening guidelines and healthy behaviors given to patient with after visit summary.  Discussed need for cervical cancer screening (previously done by Dr. Donovan >5 years ago - may return to have completed by me or one of the female providers in our office) and mammogram (previously done 12/2023 - Recommend repeated before 12/2025).   Reviewed vaccine recommendations.  Getting updated labs.     -     Vitamin D 25 Hydroxy; Future  -     Lipid Panel; Future  -     CBC; Future  -     Basic Metabolic Panel; Future          Return in 1 year (on 1/21/2026) for CPE (Physical Exam).     Personalized Preventive Plan  Current Health Maintenance Status  Immunization History   Administered Date(s) Administered    Influenza, Trivalent PF 11/10/2014    TDaP, ADACEL (age 10y-64y), BOOSTRIX (age 10y+), IM, 0.5mL 04/25/2011        Health Maintenance Due   Topic Date Due    Hepatitis C screen  Never done    Hepatitis B vaccine (1 of 3 - 19+ 3-dose series) Never done

## 2025-02-08 PROBLEM — Z90.49 S/P CHOLECYSTECTOMY: Status: RESOLVED | Noted: 2017-01-03 | Resolved: 2025-02-08

## 2025-03-05 ENCOUNTER — OFFICE VISIT (OUTPATIENT)
Age: 60
End: 2025-03-05
Payer: OTHER GOVERNMENT

## 2025-03-05 ENCOUNTER — HOSPITAL ENCOUNTER (OUTPATIENT)
Facility: HOSPITAL | Age: 60
Setting detail: SPECIMEN
Discharge: HOME | End: 2025-03-08
Payer: OTHER GOVERNMENT

## 2025-03-05 ENCOUNTER — LAB (OUTPATIENT)
Age: 60
End: 2025-03-05
Payer: OTHER GOVERNMENT

## 2025-03-05 VITALS — OXYGEN SATURATION: 98 % | DIASTOLIC BLOOD PRESSURE: 56 MMHG | SYSTOLIC BLOOD PRESSURE: 87 MMHG

## 2025-03-05 DIAGNOSIS — Z00.00 ENCOUNTER FOR WELL ADULT EXAM WITHOUT ABNORMAL FINDINGS: ICD-10-CM

## 2025-03-05 DIAGNOSIS — Z12.4 CERVICAL CANCER SCREENING: ICD-10-CM

## 2025-03-05 DIAGNOSIS — Z12.31 ENCOUNTER FOR SCREENING MAMMOGRAM FOR MALIGNANT NEOPLASM OF BREAST: ICD-10-CM

## 2025-03-05 DIAGNOSIS — N95.2 VAGINAL ATROPHY: Primary | ICD-10-CM

## 2025-03-05 PROCEDURE — 99213 OFFICE O/P EST LOW 20 MIN: CPT | Performed by: FAMILY MEDICINE

## 2025-03-05 PROCEDURE — 87624 HPV HI-RISK TYP POOLED RSLT: CPT

## 2025-03-05 PROCEDURE — 88175 CYTOPATH C/V AUTO FLUID REDO: CPT

## 2025-03-05 RX ORDER — ESTRADIOL 0.1 MG/G
CREAM VAGINAL
Qty: 42.5 G | Refills: 5 | Status: SHIPPED | OUTPATIENT
Start: 2025-03-05

## 2025-03-05 SDOH — ECONOMIC STABILITY: FOOD INSECURITY: WITHIN THE PAST 12 MONTHS, THE FOOD YOU BOUGHT JUST DIDN'T LAST AND YOU DIDN'T HAVE MONEY TO GET MORE.: PATIENT DECLINED

## 2025-03-05 SDOH — ECONOMIC STABILITY: FOOD INSECURITY: WITHIN THE PAST 12 MONTHS, YOU WORRIED THAT YOUR FOOD WOULD RUN OUT BEFORE YOU GOT MONEY TO BUY MORE.: PATIENT DECLINED

## 2025-03-05 NOTE — PROGRESS NOTES
Subjective:   Marcia Virk is a 59 y.o. female who presents for pap:    Recently had wellness visit with Dr. Jean, desired to come back for pap.  Last pap 2019 in our system NILM, HPV neg.  Concern for vaginal dryness, dyspareunia, menopause early around age 43.  No personal or family history of breast or ovarian.  Maybe distant history of a gyn cancer.      Medications- reviewed:   Current Outpatient Medications   Medication Sig    estradiol (ESTRACE VAGINAL) 0.1 MG/GM vaginal cream Place 1g vaginally daily for 2 weeks then 1g vaginally once a week thereafter    tretinoin (RETIN-A) 0.05 % cream APPLY TO AFFECTED AREA(S) AS NEEDED (FACE LESION)    Turmeric (QC TUMERIC COMPLEX PO) Take by mouth daily    VITAMIN K PO Take by mouth daily     No current facility-administered medications for this visit.         Past Medical History- reviewed:  Past Medical History:   Diagnosis Date    Acid indigestion     Aneurysm 12/2022    Esophagitis     Fast heart beat     Gilbert syndrome     Hypercholesterolemia          Review of Systems  General: No fevers or chills  GI: No abdominal pain, nausea, or vomiting  : No dysuria      Physical Exam   BP (!) 87/56 (Site: Left Upper Arm, Position: Sitting)   SpO2 98%     General: No apparent distress.  Alert and oriented.  Responds to all questions appropriately.   Abdomen: Soft; nontender; nondistended; no masses or organomegaly.   : Exam chaperoned by Marianna Ha LPN. Normal external female genitalia, no lesions. Speculum exam: cervix is non-friable, vaginal mucosa is pale and thin. Bimanual exam: no cervical motion tenderness; no adnexal mass or tenderness bilaterally; uterus is non-tender and normal size.       Assessment/Plan:      Diagnosis Orders   1. Vaginal atrophy  estradiol (ESTRACE VAGINAL) 0.1 MG/GM vaginal cream      2. Encounter for screening mammogram for malignant neoplasm of breast  [facility] DIGITAL SCREEN W OR WO CAD BILATERAL      3. Cervical cancer screening

## 2025-03-06 LAB
25(OH)D3 SERPL-MCNC: 83.2 NG/ML (ref 30–100)
ANION GAP SERPL CALC-SCNC: 5 MMOL/L (ref 2–12)
BUN SERPL-MCNC: 15 MG/DL (ref 6–20)
BUN/CREAT SERPL: 22 (ref 12–20)
CALCIUM SERPL-MCNC: 9 MG/DL (ref 8.5–10.1)
CHLORIDE SERPL-SCNC: 106 MMOL/L (ref 97–108)
CHOLEST SERPL-MCNC: 211 MG/DL
CO2 SERPL-SCNC: 27 MMOL/L (ref 21–32)
CREAT SERPL-MCNC: 0.68 MG/DL (ref 0.55–1.02)
ERYTHROCYTE [DISTWIDTH] IN BLOOD BY AUTOMATED COUNT: 13 % (ref 11.5–14.5)
GLUCOSE SERPL-MCNC: 96 MG/DL (ref 65–100)
HCT VFR BLD AUTO: 40.2 % (ref 35–47)
HDLC SERPL-MCNC: 88 MG/DL
HDLC SERPL: 2.4 (ref 0–5)
HGB BLD-MCNC: 13.5 G/DL (ref 11.5–16)
LDLC SERPL CALC-MCNC: 111.2 MG/DL (ref 0–100)
MCH RBC QN AUTO: 31.6 PG (ref 26–34)
MCHC RBC AUTO-ENTMCNC: 33.6 G/DL (ref 30–36.5)
MCV RBC AUTO: 94.1 FL (ref 80–99)
NRBC # BLD: 0 K/UL (ref 0–0.01)
NRBC BLD-RTO: 0 PER 100 WBC
PLATELET # BLD AUTO: 291 K/UL (ref 150–400)
PMV BLD AUTO: 10 FL (ref 8.9–12.9)
POTASSIUM SERPL-SCNC: 3.7 MMOL/L (ref 3.5–5.1)
RBC # BLD AUTO: 4.27 M/UL (ref 3.8–5.2)
SODIUM SERPL-SCNC: 138 MMOL/L (ref 136–145)
TRIGL SERPL-MCNC: 59 MG/DL
VLDLC SERPL CALC-MCNC: 11.8 MG/DL
WBC # BLD AUTO: 3.4 K/UL (ref 3.6–11)

## 2025-03-07 DIAGNOSIS — N95.2 VAGINAL ATROPHY: ICD-10-CM

## 2025-03-10 ENCOUNTER — PATIENT MESSAGE (OUTPATIENT)
Age: 60
End: 2025-03-10

## 2025-03-10 DIAGNOSIS — N95.2 VAGINAL ATROPHY: ICD-10-CM

## 2025-03-11 ENCOUNTER — RESULTS FOLLOW-UP (OUTPATIENT)
Facility: HOSPITAL | Age: 60
End: 2025-03-11

## 2025-03-18 RX ORDER — ESTRADIOL 0.1 MG/G
CREAM VAGINAL
Qty: 42.5 G | Refills: 5 | Status: SHIPPED | OUTPATIENT
Start: 2025-03-18

## 2025-03-18 RX ORDER — ESTRADIOL 0.1 MG/G
CREAM VAGINAL
Refills: 0 | OUTPATIENT
Start: 2025-03-18

## 2025-03-18 ASSESSMENT — SLEEP AND FATIGUE QUESTIONNAIRES
FOSQ SCORE: 18
HOW LIKELY ARE YOU TO NOD OFF OR FALL ASLEEP WHILE LYING DOWN TO REST IN THE AFTERNOON WHEN CIRCUMSTANCES PERMIT: MODERATE CHANCE OF DOZING
HAS YOUR MOOD BEEN AFFECTED BECAUSE YOU ARE SLEEPY OR TIRED: NO
DO YOU HAVE DIFFICULTY BEING AS ACTIVE AS YOU WANT TO BE IN THE MORNING BECAUSE YOU ARE SLEEPY OR TIRED: YES, LITTLE
DO YOU HAVE DIFFICULTY WATCHING A MOVIE OR VIDEO BECAUSE YOU BECOME SLEEPY OR TIRED: NO
HOW LIKELY ARE YOU TO NOD OFF OR FALL ASLEEP WHILE SITTING INACTIVE IN A PUBLIC PLACE: WOULD NEVER DOZE
HOW LIKELY ARE YOU TO NOD OFF OR FALL ASLEEP WHILE WATCHING TV: SLIGHT CHANCE OF DOZING
ESS TOTAL SCORE: 7
HOW LIKELY ARE YOU TO NOD OFF OR FALL ASLEEP WHEN YOU ARE A PASSENGER IN A CAR FOR AN HOUR WITHOUT A BREAK: SLIGHT CHANCE OF DOZING
HOW LIKELY ARE YOU TO NOD OFF OR FALL ASLEEP WHILE SITTING AND TALKING TO SOMEONE: WOULD NEVER DOZE
DO YOU HAVE DIFFICULTY VISITING YOUR FAMILY OR FRIENDS IN THEIR HOME BECAUSE YOU BECOME SLEEPY OR TIRED: NO
HOW LIKELY ARE YOU TO NOD OFF OR FALL ASLEEP IN A CAR, WHILE STOPPED FOR A FEW MINUTES IN TRAFFIC: WOULD NEVER DOZE
DO YOU HAVE DIFFICULTY CONCENTRATING ON THE THINGS YOU DO BECAUSE YOU ARE SLEEPY OR TIRED: YES, A LITTLE
DO YOU HAVE DIFFICULTY BEING AS ACTIVE AS YOU WANT TO BE IN THE EVENING BECAUSE YOU ARE SLEEPY OR TIRED: YES, LITTLE
HOW LIKELY ARE YOU TO NOD OFF OR FALL ASLEEP WHEN YOU ARE A PASSENGER IN A CAR FOR AN HOUR WITHOUT A BREAK: SLIGHT CHANCE OF DOZING
HOW LIKELY ARE YOU TO NOD OFF OR FALL ASLEEP WHILE SITTING AND READING: SLIGHT CHANCE OF DOZING
HAS YOUR RELATIONSHIP WITH FAMILY, FRIENDS OR WORK COLLEAGUES BEEN AFFECTED BECAUSE YOU ARE SLEEPY OR TIRED: NO
HOW LIKELY ARE YOU TO NOD OFF OR FALL ASLEEP WHILE SITTING QUIETLY AFTER LUNCH WITHOUT ALCOHOL: MODERATE CHANCE OF DOZING
HOW LIKELY ARE YOU TO NOD OFF OR FALL ASLEEP WHILE LYING DOWN TO REST IN THE AFTERNOON WHEN CIRCUMSTANCES PERMIT: MODERATE CHANCE OF DOZING
DO YOU GENERALLY HAVE DIFFICULTY REMEMBERING THINGS BECAUSE YOU ARE SLEEPY OR TIRED: YES, A LITTLE
HOW LIKELY ARE YOU TO NOD OFF OR FALL ASLEEP WHILE SITTING INACTIVE IN A PUBLIC PLACE: WOULD NEVER DOZE
HOW LIKELY ARE YOU TO NOD OFF OR FALL ASLEEP IN A CAR, WHILE STOPPED FOR A FEW MINUTES IN TRAFFIC: WOULD NEVER DOZE
HOW LIKELY ARE YOU TO NOD OFF OR FALL ASLEEP WHILE SITTING QUIETLY AFTER LUNCH WITHOUT ALCOHOL: MODERATE CHANCE OF DOZING
HOW LIKELY ARE YOU TO NOD OFF OR FALL ASLEEP WHILE SITTING AND TALKING TO SOMEONE: WOULD NEVER DOZE
DO YOU HAVE DIFFICULTY OPERATING A MOTOR VEHICLE FOR SHORT DISTANCES (LESS THAN 100 MILES) BECAUSE YOU BECOME SLEEPY: NO
HOW LIKELY ARE YOU TO NOD OFF OR FALL ASLEEP WHILE WATCHING TV: SLIGHT CHANCE OF DOZING
HOW LIKELY ARE YOU TO NOD OFF OR FALL ASLEEP WHILE SITTING AND READING: SLIGHT CHANCE OF DOZING
DO YOU HAVE DIFFICULTY OPERATING A MOTOR VEHICLE FOR LONG DISTANCES (GREATER THAN 100 MILES) BECAUSE YOU BECOME SLEEPY: NO

## 2025-03-19 ENCOUNTER — OFFICE VISIT (OUTPATIENT)
Age: 60
End: 2025-03-19
Payer: OTHER GOVERNMENT

## 2025-03-19 VITALS
BODY MASS INDEX: 18.49 KG/M2 | SYSTOLIC BLOOD PRESSURE: 116 MMHG | HEART RATE: 60 BPM | OXYGEN SATURATION: 98 % | TEMPERATURE: 97.9 F | WEIGHT: 122 LBS | HEIGHT: 68 IN | DIASTOLIC BLOOD PRESSURE: 72 MMHG

## 2025-03-19 DIAGNOSIS — G47.33 OSA (OBSTRUCTIVE SLEEP APNEA): Primary | ICD-10-CM

## 2025-03-19 PROCEDURE — 99213 OFFICE O/P EST LOW 20 MIN: CPT | Performed by: SPECIALIST

## 2025-03-19 NOTE — PROGRESS NOTES
Cancer (age of onset: 60) in her maternal aunt; Stomach Cancer in her father.    She  reports that she has never smoked. She has never been exposed to tobacco smoke. She has never used smokeless tobacco. She reports that she does not drink alcohol and does not use drugs.     Review of Systems:  Unchanged per patient      Objective:   /72 (BP Site: Right Upper Arm, Patient Position: Sitting, BP Cuff Size: Small Adult)   Pulse 60   Temp 97.9 °F (36.6 °C) (Oral)   Ht 1.727 m (5' 8\")   Wt 55.3 kg (122 lb)   SpO2 98%   BMI 18.55 kg/m²   Body mass index is 18.55 kg/m².     General:   Conversant, cooperative       Oropharynx:   Mallampati score III, tongue normal, narrow posterior oral airway                CVS:  Normal rate,        Neuro:  Speech fluent, face symmetrical             Assessment:   1. LANDRY (obstructive sleep apnea)       Potential sleep disordered breathing.  Patient's initial HSAT had apparently been performed while using oral appliance.  Study will be repeated without any oral device.  Plan:       * Patient has a history and examination consistent with the diagnosis of sleep apnea.  * Sleep testing was ordered for reevaluation.    * She was provided information on sleep apnea including corresponding risk factors and the importance of proper treatment.   * Treatment options if indicated were reviewed today.       Ronald Hollingsworth MD, [facility]  Electronically signed 03/19/25        This note was created using voice recognition software. Despite editing, there may be syntax errors.  This note will not be viewable in [employer]

## 2025-03-28 ENCOUNTER — RESULTS FOLLOW-UP (OUTPATIENT)
Age: 60
End: 2025-03-28

## 2025-04-10 ENCOUNTER — HOSPITAL ENCOUNTER (OUTPATIENT)
Facility: HOSPITAL | Age: 60
Discharge: HOME | End: 2025-04-13

## 2025-04-10 ENCOUNTER — PROCEDURE VISIT (OUTPATIENT)
Age: 60
End: 2025-04-10

## 2025-04-10 DIAGNOSIS — G47.33 OSA (OBSTRUCTIVE SLEEP APNEA): Primary | ICD-10-CM

## 2025-04-10 DIAGNOSIS — G47.33 OSA (OBSTRUCTIVE SLEEP APNEA): ICD-10-CM

## 2025-04-10 NOTE — PROGRESS NOTES
[address]  [place] [address]  Tel.  [phone]  Fax. [phone] [address]  [place] [address]  Tel.  [phone]  Fax. [phone] [address] [place] [address]  Tel.  [phone]  Fax. [phone]       Marcia Virk is seen today to receive a WatchPAT home sleep apnea testing (HSAT) device.    The WatchPAT HSAT Agreement was reviewed and endorsed by patient.  General information regarding operation of the HSAT device was provided.  Patient viewed the [employer] IQPAT instructional video while in the clinic.  Patient was advised to contact patient support for any problems using the device.  Patient was advised to complete the Morning Questionnaire after awakening/ending the test.    There were no vitals taken for this visit.    Patient will return the HSAT device by noon unless otherwise approved.  Patient will be contacted with results once the study has been reviewed by the physician, typically within 15 business days.    [employer] Serial Number [id number]

## 2025-04-11 ENCOUNTER — PROCEDURE VISIT (OUTPATIENT)
Age: 60
End: 2025-04-11

## 2025-04-11 DIAGNOSIS — G47.33 OSA (OBSTRUCTIVE SLEEP APNEA): Primary | ICD-10-CM

## 2025-05-06 ENCOUNTER — CLINICAL DOCUMENTATION (OUTPATIENT)
Age: 60
End: 2025-05-06

## 2025-05-06 DIAGNOSIS — G47.33 OSA (OBSTRUCTIVE SLEEP APNEA): Primary | ICD-10-CM

## 2025-05-06 NOTE — PROGRESS NOTES
WatchPAT HSAT performed for potential sleep disordered breathing.  7 hours 50 minutes recorded of which 5 hours 47 minutes of sleep; 21.6% of sleep and REM.  All sleep stages observed.  Patient slept supine, lateral and prone.    AHI 0/h (4% desaturation definition).  AHI 1.2/h (3% desaturation definition) with a REM-related AHI of 0.8/h.  Minimal SaO2 92%.  Snoring not noted.    Impression: HSAT did not demonstrate significant sleep disordered breathing.  Examination did demonstrate long soft palate, narrow posterior oral airway.  Potentially, HSAT underestimated severity of sleep disordered breathing.  \  Recommendation: Attended study    Sleep technologist: Please advise patient of HSAT results.  Order entered for attended study.

## 2025-05-12 ENCOUNTER — OFFICE VISIT (OUTPATIENT)
Age: 60
End: 2025-05-12
Payer: OTHER GOVERNMENT

## 2025-05-12 VITALS
DIASTOLIC BLOOD PRESSURE: 54 MMHG | HEIGHT: 68 IN | OXYGEN SATURATION: 97 % | TEMPERATURE: 98.1 F | BODY MASS INDEX: 18.55 KG/M2 | HEART RATE: 75 BPM | RESPIRATION RATE: 20 BRPM | SYSTOLIC BLOOD PRESSURE: 93 MMHG

## 2025-05-12 DIAGNOSIS — L30.9 ECZEMA, UNSPECIFIED TYPE: ICD-10-CM

## 2025-05-12 DIAGNOSIS — R51.9 ACUTE NONINTRACTABLE HEADACHE, UNSPECIFIED HEADACHE TYPE: ICD-10-CM

## 2025-05-12 DIAGNOSIS — Q28.2 AVM (ARTERIOVENOUS MALFORMATION) BRAIN: Primary | ICD-10-CM

## 2025-05-12 PROCEDURE — 99214 OFFICE O/P EST MOD 30 MIN: CPT | Performed by: FAMILY MEDICINE

## 2025-05-12 ASSESSMENT — PATIENT HEALTH QUESTIONNAIRE - PHQ9
SUM OF ALL RESPONSES TO PHQ QUESTIONS 1-9: 0
1. LITTLE INTEREST OR PLEASURE IN DOING THINGS: NOT AT ALL
2. FEELING DOWN, DEPRESSED OR HOPELESS: NOT AT ALL
SUM OF ALL RESPONSES TO PHQ QUESTIONS 1-9: 0

## 2025-05-28 NOTE — PROGRESS NOTES
Identified pt with two pt identifiers(name and ). Reviewed record in preparation for visit and have obtained necessary documentation.  Chief Complaint   Patient presents with    Headache   Headaches with \"zings\" the pt reports, that causes her to stop for a second d/t the pain.  She reports these are to the frontal lobe. She states she feels pressure and the zings come and go and don't last long.     She is also requesting a referral letter in regards to her surgery to Dr. Ledezma specifically for insurance.     Health Maintenance Due   Topic    Hepatitis C screen     Hepatitis B vaccine (1 of 3 - 19+ 3-dose series)    Shingles vaccine (1 of 2)    Pneumococcal 50+ years Vaccine (1 of 1 - PCV)    DTaP/Tdap/Td vaccine (2 - Td or Tdap)    COVID-19 Vaccine ( season)    Breast cancer screen        Vitals:    25 1003   BP: (!) 93/54   BP Site: Right Upper Arm   Patient Position: Sitting   BP Cuff Size: Medium Adult   Pulse: 75   Resp: 20   Temp: 98.1 °F (36.7 °C)   TempSrc: Oral   SpO2: 97%   Height: 1.727 m (5' 8\")         \"Have you been to the ER, urgent care clinic since your last visit?  Hospitalized since your last visit?\"    NO    “Have you seen or consulted any other health care providers outside of [facility] since your last visit?”    NO    Have you had a mammogram?”   NO    Date of last Mammogram: 2023             Click Here for Release of Records Request     This patient is accompanied in the office by her self.  I have received verbal consent from Marcia Virk to discuss any/all medical information while they are present in the room.  
of eczema with dry patches on chest, neck, and hands, recently noticing a similar patch on leg.  - Physical exam reveals dry patches; differential diagnosis includes ringworm.  - Advised to apply hydrocortisone cream to the affected area.  - If condition worsens, it may indicate ringworm, and an antifungal cream should be used.       Return in about 6 months (around 2025), or if symptoms worsen or fail to improve.       On this date 2025 I have spent 30 minutes reviewing previous notes, test results and face to face with the patient discussing the diagnosis and importance of compliance with the treatment plan as well as documenting on the day of the visit.    The patient (or guardian, if applicable) and other individuals in attendance with the patient were advised that Artificial Intelligence will be utilized during this visit to record and process the conversation to generate a clinical note. The patient (or guardian, if applicable) and other individuals in attendance at the appointment consented to the use of AI, including the recording.      An electronic signature was used to authenticate this note.  --Levar Jean MD     ______________________________________________________________________________________________________  Patients past medical, surgical and family histories were reviewed and updated.    History     Past Medical History:   Diagnosis Date    Acid indigestion     Aneurysm 2022    Esophagitis     Fast heart beat     Gilbert syndrome     Hypercholesterolemia      Past Surgical History:   Procedure Laterality Date     SECTION  ,     CHOLECYSTECTOMY  3/2016    COLONOSCOPY N/A 6/10/2022    COLONOSCOPY performed by Amaury Burrows MD at [facility] ENDOSCOPY    EMBOLIZATION  2023    GYN  2006    c-sec    ISMAEL  1970    tonsilectomy    HERNIA REPAIR      IR VASC EMBOLIZE OCCLUDE ARTERY  2023    IR VASC EMBOLIZE OCCLUDE ARTERY    IR VASC EMBOLIZE OCCLUDE ARTERY

## 2025-05-28 NOTE — ASSESSMENT & PLAN NOTE
Patient currently in process of scheduling with neurosurgeon in [place] for treatment of AVM.  Significant portion of today's encounter spent in weighing the risks and benefits of undergoing surgical intervention.  She does have follow-up with her neurology/neurosurgery group here in [facility].  Encouraged cricket discussion about the differences of opinion to help clarify how she should move forward.

## 2025-06-20 NOTE — ED NOTES
Dr. Gustavo Griggs gave and reviewed discharge instructions with the patient. The patient verbalized understanding. The patient was given opportunity for questions. Patient discharged in stable condition to the waiting room via ambulatory. Partially impaired: cannot see medication labels or newsprint, but can see obstacles in path, and the surrounding layout; can count fingers at arm's length

## 2025-07-18 ENCOUNTER — HOSPITAL ENCOUNTER (OUTPATIENT)
Facility: HOSPITAL | Age: 60
Discharge: HOME | End: 2025-07-21
Attending: SPECIALIST
Payer: OTHER GOVERNMENT

## 2025-07-18 VITALS
RESPIRATION RATE: 14 BRPM | OXYGEN SATURATION: 98 % | SYSTOLIC BLOOD PRESSURE: 110 MMHG | DIASTOLIC BLOOD PRESSURE: 70 MMHG | WEIGHT: 122 LBS | HEIGHT: 68 IN | TEMPERATURE: 98.7 F | BODY MASS INDEX: 18.49 KG/M2 | HEART RATE: 68 BPM

## 2025-07-18 DIAGNOSIS — F51.09 SITUATIONAL INSOMNIA: Primary | ICD-10-CM

## 2025-07-18 DIAGNOSIS — G47.33 OSA (OBSTRUCTIVE SLEEP APNEA): ICD-10-CM

## 2025-07-18 PROCEDURE — 95810 POLYSOM 6/> YRS 4/> PARAM: CPT | Performed by: SPECIALIST

## 2025-07-18 RX ORDER — LORAZEPAM 1 MG/1
TABLET ORAL
Qty: 1 TABLET | Refills: 0 | Status: SHIPPED | OUTPATIENT
Start: 2025-07-18 | End: 2025-07-19

## 2025-07-18 NOTE — PROGRESS NOTES
Pt with upcoming sleep study.  Concerned about situational insomnia.  1 time Rx for ativan provided

## 2025-07-23 ENCOUNTER — CLINICAL DOCUMENTATION (OUTPATIENT)
Age: 60
End: 2025-07-23

## 2025-07-23 NOTE — PROGRESS NOTES
PSG performed for potential sleep disordered breathing.  471.4 minutes recorded of which 380.5 minutes of sleep with a sleep efficiency of 80.7%.  Sleep onset at 38.3 minutes; REM onset at 94.5 minutes with total REM representing 19.1% of sleep time.    16 respiratory events occurred of which 13 hypopnea and 3 apnea (2 central, 1 obstructive).  Overall AHI 2.5/h.  Patient slept supine and right lateral.  Minimal SaO2 briefly 86%.  Snoring appreciated.    Impression: PSG did not demonstrate significant sleep disordered breathing.    Sleep technologist: Please advise patient of study results.  At present, further sleep evaluation is indicated.

## 2025-08-11 ENCOUNTER — PATIENT MESSAGE (OUTPATIENT)
Age: 60
End: 2025-08-11

## 2025-08-11 DIAGNOSIS — L98.9 LESION OF SKIN OF NOSE: Primary | ICD-10-CM

## (undated) DEVICE — Device

## (undated) DEVICE — KIT COLON W/ 1.1OZ LUB AND 2 END

## (undated) DEVICE — BITEBLOCK ENDOSCP 60FR MAXI WHT POLYETH STURDY W/ VELC WVN

## (undated) DEVICE — IV STRT KT 3282] LSL INDUSTRIES INC]

## (undated) DEVICE — CATHETER IV 22GA L1IN OD0.8382-0.9144MM ID0.6096-0.6858MM 382523

## (undated) DEVICE — FORCEPS BX L240CM JAW DIA2.8MM L CAP W/ NDL MIC MESH TOOTH

## (undated) DEVICE — BAG SPEC BIOHZRD 10 X 10 IN --

## (undated) DEVICE — BLUNTFILL: Brand: MONOJECT

## (undated) DEVICE — (D)CUP DENT 7.5OZ PLAS DSTY -- DISC BY MFR USE ITEM 341725

## (undated) DEVICE — 3M™ CUROS™ DISINFECTING CAP FOR NEEDLELESS CONNECTORS 270/CARTON 20 CARTONS/CASE CFF1-270: Brand: CUROS™

## (undated) DEVICE — SOLIDIFIER MEDC 1200ML -- CONVERT TO 356117

## (undated) DEVICE — SET ADMIN 16ML TBNG L100IN 2 Y INJ SITE IV PIGGY BK DISP

## (undated) DEVICE — 1200 GUARD II KIT W/5MM TUBE W/O VAC TUBE: Brand: GUARDIAN

## (undated) DEVICE — BLUNTFILL WITH FILTER: Brand: MONOJECT

## (undated) DEVICE — SET ADMIN 16ML TBNG L100IN 2 Y INJ SITE IV PIGGY BK DISP (ORDER IN MULIPLES OF 48)

## (undated) DEVICE — ELECTRODE,RADIOTRANSLUCENT,FOAM,3PK: Brand: MEDLINE

## (undated) DEVICE — CATH IV AUTOGRD BC PNK 20GA 25 -- INSYTE

## (undated) DEVICE — SIMPLICITY FLUFF UNDERPAD 23X36, MODERATE: Brand: SIMPLICITY

## (undated) DEVICE — CANNULA CUSH AD W/ 14FT TBG

## (undated) DEVICE — CONTAINER SPEC 20 ML LID NEUT BUFF FORMALIN 10 % POLYPR STS

## (undated) DEVICE — SOLIDIFIER FLD 2OZ 1500CC N DISINF IN BTL DISP SAFESORB

## (undated) DEVICE — SYRINGE MED 5ML STD CLR PLAS LUERLOCK TIP N CTRL DISP

## (undated) DEVICE — SYRINGE MEDICAL 3ML CLEAR PLASTIC STANDARD NON CONTROL LUERLOCK TIP DISPOSABLE

## (undated) DEVICE — BAG BELONG PT PERS CLEAR HANDL